# Patient Record
Sex: MALE | Race: WHITE | NOT HISPANIC OR LATINO | Employment: FULL TIME | URBAN - METROPOLITAN AREA
[De-identification: names, ages, dates, MRNs, and addresses within clinical notes are randomized per-mention and may not be internally consistent; named-entity substitution may affect disease eponyms.]

---

## 2017-02-18 DIAGNOSIS — E03.4 HYPOTHYROIDISM DUE TO ACQUIRED ATROPHY OF THYROID: ICD-10-CM

## 2017-02-20 ENCOUNTER — LAB VISIT (OUTPATIENT)
Dept: LAB | Facility: HOSPITAL | Age: 42
End: 2017-02-20
Attending: INTERNAL MEDICINE
Payer: COMMERCIAL

## 2017-02-20 DIAGNOSIS — E03.4 HYPOTHYROIDISM DUE TO ACQUIRED ATROPHY OF THYROID: ICD-10-CM

## 2017-02-20 LAB
T4 FREE SERPL-MCNC: 1.31 NG/DL
TSH SERPL DL<=0.005 MIU/L-ACNC: 0.12 UIU/ML

## 2017-02-20 PROCEDURE — 84443 ASSAY THYROID STIM HORMONE: CPT

## 2017-02-20 PROCEDURE — 84439 ASSAY OF FREE THYROXINE: CPT

## 2017-02-20 PROCEDURE — 36415 COLL VENOUS BLD VENIPUNCTURE: CPT | Mod: PO

## 2017-02-20 RX ORDER — LEVOTHYROXINE SODIUM 150 UG/1
TABLET ORAL
Qty: 30 TABLET | Refills: 1 | Status: SHIPPED | OUTPATIENT
Start: 2017-02-20 | End: 2017-06-29 | Stop reason: SDUPTHER

## 2017-06-12 ENCOUNTER — OFFICE VISIT (OUTPATIENT)
Dept: ORTHOPEDICS | Facility: CLINIC | Age: 42
End: 2017-06-12
Payer: COMMERCIAL

## 2017-06-12 ENCOUNTER — TELEPHONE (OUTPATIENT)
Dept: ORTHOPEDICS | Facility: CLINIC | Age: 42
End: 2017-06-12

## 2017-06-12 VITALS — HEIGHT: 68 IN | BODY MASS INDEX: 22.73 KG/M2 | WEIGHT: 150 LBS

## 2017-06-12 DIAGNOSIS — M77.8 TENDONITIS OF BOTH WRISTS: ICD-10-CM

## 2017-06-12 PROCEDURE — 99999 PR PBB SHADOW E&M-EST. PATIENT-LVL II: CPT | Mod: PBBFAC,,, | Performed by: ORTHOPAEDIC SURGERY

## 2017-06-12 PROCEDURE — 99203 OFFICE O/P NEW LOW 30 MIN: CPT | Mod: S$GLB,,, | Performed by: ORTHOPAEDIC SURGERY

## 2017-06-12 NOTE — TELEPHONE ENCOUNTER
----- Message from Tabatha Caldera sent at 6/12/2017  8:29 AM CDT -----  Contact: pt  X_  1st Request  _  2nd Request  _  3rd Request    ------FST Request--------    Who:NU ERNST JR. [[id number]]    Why: Patient states he is experiencing pain in both wrist patient would like to be seen today...... Please contact to further discuss and advise     What Number to Call Back: [phone]    When to Expect a call back: (Before the end of the day)   -- if call after 3:00 call back will be tomorrow.

## 2017-06-12 NOTE — PROGRESS NOTES
INITIAL VISIT HISTORY:  A 41-year-old male presents for evaluation of bilateral   hand and wrist pain of one week's duration.  Symptoms were brought on by doing a   lot of computer work recently.  Right hand is worse than left.  No numbness or   tingling is reported.  No trauma reported.  Symptoms are worse with flexion and   extension of the right wrist.    PAST MEDICAL HISTORY:  Significant for anxiety, hyperlipidemia and   hypothyroidism.    PAST SURGICAL HISTORY:  None.    FAMILY HISTORY:  Positive for hyperlipidemia.    SOCIAL HISTORY:  The patient does not smoke.  Drinks occasionally.    REVIEW OF SYSTEMS:  Negative fever, chills, rashes.    CURRENT MEDICATIONS:  Reviewed on chart.    ALLERGIES:  None.    PHYSICAL EXAMINATION:  GENERAL:  Well-developed, well-nourished male in no acute distress, alert and   oriented x3.  EXTREMITIES:  Examination of the upper extremities significant for both hands,   demonstrating tenderness over the dorsal aspect of each wrist, right worse than   left.  He has a little bit of swelling on the right wrist dorsally and is tender   over the extensor retinaculum.  He does have pain with extreme flexion and   extension of the wrist, particularly resisted wrist extension.  Tinel sign is   negative bilaterally.  Phalen test negative bilaterally.  Sensation intact in   all digits.    No x-rays.    IMPRESSION:  Bilateral wrist tendinitis, right worse than left (extensor).    PLAN:  I explained to the patient that I do not think this is carpal tunnel,   which had been one of his concerns, but I do suspect tendinitis.  I have given   him two braces for use during the day, part-time use and avoidance of heavy   lifting.  I have also started him on a Medrol Dosepak prescribed today in a   graduated fashion to see how he does.  Follow up in two weeks for recheck.  If   symptoms persist, we may need to get some x-rays.      JOEL  dd: 06/12/2017 16:02:20 (CDT)  td: 06/13/2017 11:54:42 (CDT)   Doc ID   #[id number]  Job ID #[id number]    CC:

## 2017-06-12 NOTE — TELEPHONE ENCOUNTER
Appt made for today w/Dr Mcneal at [facility]. Pt verbalized understanding to date/time/location.

## 2017-06-29 DIAGNOSIS — E03.4 HYPOTHYROIDISM DUE TO ACQUIRED ATROPHY OF THYROID: ICD-10-CM

## 2017-06-29 RX ORDER — LEVOTHYROXINE SODIUM 150 UG/1
TABLET ORAL
Qty: 30 TABLET | Refills: 0 | Status: SHIPPED | OUTPATIENT
Start: 2017-06-29 | End: 2017-07-20 | Stop reason: SDUPTHER

## 2017-07-12 ENCOUNTER — TELEPHONE (OUTPATIENT)
Dept: ORTHOPEDICS | Facility: CLINIC | Age: 42
End: 2017-07-12

## 2017-07-12 NOTE — TELEPHONE ENCOUNTER
----- Message from Domo Guzmán sent at 7/12/2017  9:07 AM CDT -----  Contact: [phone]/self  Pt needs to reschedule his 2 wk follow up//chhaya wrist pain appointment.  Please advise

## 2017-07-20 DIAGNOSIS — E78.00 HYPERCHOLESTEREMIA: ICD-10-CM

## 2017-07-20 DIAGNOSIS — E03.4 HYPOTHYROIDISM DUE TO ACQUIRED ATROPHY OF THYROID: ICD-10-CM

## 2017-07-20 RX ORDER — ATORVASTATIN CALCIUM 20 MG/1
20 TABLET, FILM COATED ORAL DAILY
Qty: 90 TABLET | Refills: 1 | Status: SHIPPED | OUTPATIENT
Start: 2017-07-20 | End: 2017-11-10 | Stop reason: SDUPTHER

## 2017-07-20 RX ORDER — LEVOTHYROXINE SODIUM 150 UG/1
150 TABLET ORAL DAILY
Qty: 30 TABLET | Refills: 5 | Status: SHIPPED | OUTPATIENT
Start: 2017-07-20 | End: 2017-11-10 | Stop reason: SDUPTHER

## 2017-07-20 NOTE — TELEPHONE ENCOUNTER
----- Message from Yessenia Beth sent at 7/19/2017  3:51 PM CDT -----  Contact: patient [phone]  Pt has changed pharmacies and will using [facility]  [phone]    Pt needs a refill of thyroid and cholesterol meds ordered. Pt doesn't know the names of his meds.

## 2017-08-14 ENCOUNTER — TELEPHONE (OUTPATIENT)
Dept: ORTHOPEDICS | Facility: CLINIC | Age: 42
End: 2017-08-14

## 2017-08-14 NOTE — TELEPHONE ENCOUNTER
I spoke with the patient and made him a follow up appointment. He was made aware of date, time and location.

## 2017-08-14 NOTE — TELEPHONE ENCOUNTER
----- Message from Josie Rios sent at 8/14/2017 10:42 AM CDT -----  Contact: Self [phone] work [phone]  Patient is calling to see if he can come in this week he is having wrist pain. Please advice

## 2017-08-17 ENCOUNTER — OFFICE VISIT (OUTPATIENT)
Dept: ORTHOPEDICS | Facility: CLINIC | Age: 42
End: 2017-08-17
Payer: COMMERCIAL

## 2017-08-17 DIAGNOSIS — M77.8 TENDONITIS OF BOTH WRISTS: Primary | ICD-10-CM

## 2017-08-17 PROCEDURE — 99213 OFFICE O/P EST LOW 20 MIN: CPT | Mod: 25,S$GLB,, | Performed by: ORTHOPAEDIC SURGERY

## 2017-08-17 PROCEDURE — 20550 NJX 1 TENDON SHEATH/LIGAMENT: CPT | Mod: 50,S$GLB,, | Performed by: ORTHOPAEDIC SURGERY

## 2017-08-17 PROCEDURE — 99999 PR PBB SHADOW E&M-EST. PATIENT-LVL I: CPT | Mod: PBBFAC,,, | Performed by: ORTHOPAEDIC SURGERY

## 2017-08-17 RX ORDER — TRIAMCINOLONE ACETONIDE 40 MG/ML
40 INJECTION, SUSPENSION INTRA-ARTICULAR; INTRAMUSCULAR
Status: COMPLETED | OUTPATIENT
Start: 2017-08-17 | End: 2017-08-17

## 2017-08-17 RX ADMIN — TRIAMCINOLONE ACETONIDE 40 MG: 40 INJECTION, SUSPENSION INTRA-ARTICULAR; INTRAMUSCULAR at 04:08

## 2017-08-17 NOTE — PROGRESS NOTES
HISTORY OF PRESENT ILLNESS:  Mr. Snyder in followup of bilateral hand and wrist   symptoms, had some improvement after the previous Medrol Dosepak, but still   having some pain in both forearms.  He does localize the area proximal to the   wrist crease near the outcropper muscles at the intersection of the second and   first dorsal compartment.    PHYSICAL EXAMINATION:  He is tender at the intersection between the second and   first dorsal compartment bilaterally.  Mild swelling is noted.  Pain with   resisted wrist extension.  Tinel sign negative bilateral.    IMPRESSION:  Extensor tendinitis, bilateral second dorsal compartment.    PLAN:  I explained the nature of the problem to the patient.  Recommended   injections into each, he was in agreement.  After pause for timeout, the patient   identified each wrist area, injected with combination of Kenalog 20 mg, 0.5 mL   Xylocaine, sterile technique, tolerated the procedure well.  Recommended ice to   both wrists, anti-inflammatory medication by mouth and follow up in one month.      JEOL  dd: 08/17/2017 16:25:25 (CDT)  td: 08/17/2017 23:25:28 (CDT)  Doc ID   #[id number]  Job ID #[id number]    CC:

## 2017-09-05 ENCOUNTER — OFFICE VISIT (OUTPATIENT)
Dept: INTERNAL MEDICINE | Facility: CLINIC | Age: 42
End: 2017-09-05
Payer: COMMERCIAL

## 2017-09-05 VITALS
DIASTOLIC BLOOD PRESSURE: 80 MMHG | HEIGHT: 68 IN | HEART RATE: 62 BPM | TEMPERATURE: 99 F | BODY MASS INDEX: 22.69 KG/M2 | RESPIRATION RATE: 16 BRPM | SYSTOLIC BLOOD PRESSURE: 121 MMHG | WEIGHT: 149.69 LBS

## 2017-09-05 DIAGNOSIS — M94.0 COSTOCHONDRITIS: ICD-10-CM

## 2017-09-05 LAB
BILIRUB SERPL-MCNC: NORMAL MG/DL
BLOOD URINE, POC: NORMAL
COLOR, POC UA: YELLOW
GLUCOSE UR QL STRIP: NORMAL
KETONES UR QL STRIP: NORMAL
LEUKOCYTE ESTERASE URINE, POC: NORMAL
NITRITE, POC UA: NORMAL
PH, POC UA: 6
PROTEIN, POC: NORMAL
SPECIFIC GRAVITY, POC UA: 1
UROBILINOGEN, POC UA: NORMAL

## 2017-09-05 PROCEDURE — 99214 OFFICE O/P EST MOD 30 MIN: CPT | Mod: 25,S$GLB,, | Performed by: INTERNAL MEDICINE

## 2017-09-05 PROCEDURE — 81001 URINALYSIS AUTO W/SCOPE: CPT | Mod: S$GLB,,, | Performed by: INTERNAL MEDICINE

## 2017-09-05 PROCEDURE — 99999 PR PBB SHADOW E&M-EST. PATIENT-LVL III: CPT | Mod: PBBFAC,,, | Performed by: INTERNAL MEDICINE

## 2017-09-05 PROCEDURE — 3008F BODY MASS INDEX DOCD: CPT | Mod: S$GLB,,, | Performed by: INTERNAL MEDICINE

## 2017-09-05 NOTE — PROGRESS NOTES
Subjective:       Patient ID: Srinivas Snyder Jr. is a 41 y.o. male.    Chief Complaint: Abdominal Pain  HISTORY OF PRESENT ILLNESS:  A 41-year-old white male patient of Dr. Reeder, who comes in with pain and what he calls his left upper quadrant on   and off.  The pain is increased by local pressure lying on his left side and   with certain movements.  He works at [employer] so he does a fair amount   of lifting.  He has been treated for thyroid and cholesterol problems.  He does   not do any other exercise.  He has had no problems with bowel, bladder or   breathing.    PHYSICAL EXAMINATION:  VITAL SIGNS:  Normal.  SKIN:  Fair and healthy.  There is no rash.  CHEST:  Clear.  ABDOMEN:  It is tender on the right, 9th, 10th posterior lateral rib and in the   costochondral margins.      I did a urinalysis that was negative.    IMPRESSION:  Costochondritis.  He was told to use an anti-inflammatory, told the   nature of the illness.      JDC/IN  dd: 09/06/2017 17:09:00 (CDT)  td: 09/07/2017 04:31:57 (CDT)  Doc ID   #[id number]  Job ID #[id number]    CC:     Dict [id number]  hospitals  Review of Systems   Constitutional: Negative for activity change, appetite change, fatigue and unexpected weight change.   HENT: Negative for dental problem, hearing loss, mouth sores, postnasal drip and sinus pressure.    Eyes: Negative for discharge and visual disturbance.   Respiratory: Negative for cough and shortness of breath.    Cardiovascular: Positive for chest pain. Negative for palpitations.   Gastrointestinal: Negative for abdominal pain, blood in stool, constipation, diarrhea and nausea.   Genitourinary: Negative for dysuria, hematuria and testicular pain.   Musculoskeletal: Positive for back pain. Negative for arthralgias, joint swelling and neck pain.   Skin: Negative for rash.   Neurological: Negative for weakness and headaches.   Psychiatric/Behavioral: Negative for agitation and sleep disturbance.       Objective:       Physical Exam   Constitutional: He is oriented to person, place, and time. He appears well-developed and well-nourished.   HENT:   Head: Normocephalic.   Right Ear: External ear normal.   Left Ear: External ear normal.   Mouth/Throat: Oropharynx is clear and moist.   Eyes: EOM are normal. Pupils are equal, round, and reactive to light. Right eye exhibits no discharge.   Neck: Normal range of motion. No JVD present. No tracheal deviation present. No thyromegaly present.   Cardiovascular: Normal rate, regular rhythm, normal heart sounds and intact distal pulses.  Exam reveals no gallop.    No murmur heard.  Pulmonary/Chest: Effort normal and breath sounds normal. He has no wheezes. He has no rales. He exhibits tenderness.   Abdominal: Soft. Bowel sounds are normal. He exhibits no mass. There is no tenderness.   Genitourinary: Prostate normal and penis normal. Rectal exam shows guaiac negative stool.   Musculoskeletal: Normal range of motion. He exhibits tenderness. He exhibits no edema.   Lymphadenopathy:     He has no cervical adenopathy.   Neurological: He is oriented to person, place, and time. He displays normal reflexes. No cranial nerve deficit. Coordination normal.   Skin: Skin is warm. No rash noted. No pallor.   Psychiatric: He has a normal mood and affect.       Assessment:       1. Costochondritis        Plan:

## 2017-11-10 ENCOUNTER — TELEPHONE (OUTPATIENT)
Dept: INTERNAL MEDICINE | Facility: CLINIC | Age: 42
End: 2017-11-10

## 2017-11-10 DIAGNOSIS — E78.00 HYPERCHOLESTEREMIA: ICD-10-CM

## 2017-11-10 DIAGNOSIS — E03.4 HYPOTHYROIDISM DUE TO ACQUIRED ATROPHY OF THYROID: ICD-10-CM

## 2017-11-10 RX ORDER — LEVOTHYROXINE SODIUM 150 UG/1
150 TABLET ORAL DAILY
Qty: 90 TABLET | Refills: 3 | Status: SHIPPED | OUTPATIENT
Start: 2017-11-10 | End: 2017-12-04

## 2017-11-10 RX ORDER — ATORVASTATIN CALCIUM 20 MG/1
20 TABLET, FILM COATED ORAL DAILY
Qty: 90 TABLET | Refills: 3 | Status: SHIPPED | OUTPATIENT
Start: 2017-11-10 | End: 2018-11-30 | Stop reason: SDUPTHER

## 2017-11-10 NOTE — TELEPHONE ENCOUNTER
----- Message from Melani Orr sent at 11/10/2017 11:38 AM CST -----  Contact: Self [phone]  RX request - refill or new RX.  Is this a refill or new RX:  refill  RX name and strength: levothyroxine (SYNTHROID) 150 MCG tablet  Directions:   Is this a 30 day or 90 day RX:  30  Pharmacy name and phone # : [facility]  [phone] ([phone] (Fax)  Comments:        RX request - refill or new RX.  Is this a refill or new RX:  refill  RX name and strength: atorvastatin (LIPITOR) 20 MG tablet ()  Directions:   Is this a 30 day or 90 day RX:  30  Pharmacy name and phone #[facility]  [phone] ([phone] (Fax)   Comments:

## 2017-11-28 ENCOUNTER — TELEPHONE (OUTPATIENT)
Dept: INTERNAL MEDICINE | Facility: CLINIC | Age: 42
End: 2017-11-28

## 2017-11-28 NOTE — TELEPHONE ENCOUNTER
----- Message from Shira Hill sent at 11/28/2017  9:01 AM CST -----  Contact: Self/[phone]  Pt is having difficulty taking off of work tomorrow for appointment at 4:20. Pt would like to know if at all possible can he come in early for his appointment. Please call and advise.

## 2017-11-29 ENCOUNTER — OFFICE VISIT (OUTPATIENT)
Dept: INTERNAL MEDICINE | Facility: CLINIC | Age: 42
End: 2017-11-29
Payer: COMMERCIAL

## 2017-11-29 VITALS
HEART RATE: 66 BPM | DIASTOLIC BLOOD PRESSURE: 78 MMHG | SYSTOLIC BLOOD PRESSURE: 138 MMHG | WEIGHT: 154.13 LBS | BODY MASS INDEX: 23.36 KG/M2 | TEMPERATURE: 98 F | HEIGHT: 68 IN | RESPIRATION RATE: 18 BRPM

## 2017-11-29 DIAGNOSIS — E78.00 HYPERCHOLESTEREMIA: ICD-10-CM

## 2017-11-29 DIAGNOSIS — Z00.00 ANNUAL PHYSICAL EXAM: Primary | ICD-10-CM

## 2017-11-29 DIAGNOSIS — E03.4 HYPOTHYROIDISM DUE TO ACQUIRED ATROPHY OF THYROID: ICD-10-CM

## 2017-11-29 PROCEDURE — 99999 PR PBB SHADOW E&M-EST. PATIENT-LVL III: CPT | Mod: PBBFAC,,, | Performed by: INTERNAL MEDICINE

## 2017-11-29 PROCEDURE — 99396 PREV VISIT EST AGE 40-64: CPT | Mod: S$GLB,,, | Performed by: INTERNAL MEDICINE

## 2017-11-29 NOTE — PROGRESS NOTES
Subjective:       Patient ID: Srinivas Snyder Jr. is a 42 y.o. male.    Chief Complaint: Annual Exam    HPI   42 y.o. Male here for annual exam.      Cholesterol: (needs)  Vaccines: Influenza (2017); Tetanus (2015)  Eye exam: declined     Exercise: no  Diet: regular     Past Medical History:    Anxiety                                                       Hyperlipidemia                                                Hypothyroidism                                              No past surgical history on file.  Social History    Marital status:              Spouse name:                       Years of education:                 Number of children: 1              Occupational History  Occupation          Employer            Comment               IT support                                   Social History Main Topics    Smoking status: Never Smoker                                                                 Smokeless status: Never Used                        Alcohol use: Yes           0.0 oz/week       0 Standard drinks or equivalent per week       Comment: Social     Drug use: No              Sexual activity: Yes               Partners with: Female     No Known Allergies  Mr. Snyder does not currently have medications on file.     Review of Systems   Constitutional: Negative for activity change, appetite change, chills, diaphoresis, fatigue, fever and unexpected weight change.   HENT: Negative for congestion, mouth sores, postnasal drip, rhinorrhea, sinus pressure, sneezing, sore throat, trouble swallowing and voice change.    Eyes: Negative for discharge, itching and visual disturbance.   Respiratory: Negative for cough, chest tightness, shortness of breath and wheezing.    Cardiovascular: Negative for chest pain, palpitations and leg swelling.   Gastrointestinal: Negative for abdominal pain, blood in stool, constipation, diarrhea, nausea and vomiting.   Endocrine: Negative for cold intolerance and heat  intolerance.   Genitourinary: Negative for difficulty urinating, dysuria, flank pain, hematuria and urgency.   Musculoskeletal: Negative for arthralgias, back pain, myalgias and neck pain.   Skin: Negative for rash and wound.   Allergic/Immunologic: Negative for environmental allergies and food allergies.   Neurological: Negative for dizziness, tremors, seizures, syncope, weakness and headaches.   Hematological: Negative for adenopathy. Does not bruise/bleed easily.   Psychiatric/Behavioral: Negative for confusion, sleep disturbance and suicidal ideas. The patient is not nervous/anxious.        Objective:      Physical Exam   Constitutional: He is oriented to person, place, and time. He appears well-developed and well-nourished. No distress.   HENT:   Head: Normocephalic and atraumatic.   Right Ear: External ear normal.   Left Ear: External ear normal.   Nose: Nose normal.   Mouth/Throat: Oropharynx is clear and moist. No oropharyngeal exudate.   Eyes: Conjunctivae and EOM are normal. Pupils are equal, round, and reactive to light. Right eye exhibits no discharge. Left eye exhibits no discharge. No scleral icterus.   Neck: Normal range of motion. Neck supple. No JVD present. No thyromegaly present.   Cardiovascular: Normal rate, regular rhythm, normal heart sounds and intact distal pulses.    No murmur heard.  Pulmonary/Chest: Effort normal and breath sounds normal. No respiratory distress. He has no wheezes. He has no rales.   Abdominal: Soft. Bowel sounds are normal. He exhibits no distension. There is no tenderness. There is no guarding.   Musculoskeletal: He exhibits no edema.   Lymphadenopathy:     He has no cervical adenopathy.   Neurological: He is alert and oriented to person, place, and time. No cranial nerve deficit. Coordination normal.   Skin: Skin is warm and dry. No rash noted. He is not diaphoretic. No pallor.   Psychiatric: He has a normal mood and affect. Judgment normal.       Assessment:       1.  Annual physical exam    2. Hypothyroidism due to acquired atrophy of thyroid    3. Hypercholesteremia        Plan:    Complete blood work ordered    Vaccines: Influenza (2017); Tetanus (2015)   Eye exam: declined      1. Hypothyroidism- continue Synthroid 150 mcg daily   2. HLD- continue Lipitor 20 mg daily   3. F/u in 1 yr or PRN

## 2017-12-01 ENCOUNTER — LAB VISIT (OUTPATIENT)
Dept: LAB | Facility: HOSPITAL | Age: 42
End: 2017-12-01
Attending: INTERNAL MEDICINE
Payer: COMMERCIAL

## 2017-12-01 DIAGNOSIS — Z00.00 ANNUAL PHYSICAL EXAM: ICD-10-CM

## 2017-12-01 DIAGNOSIS — E78.00 HYPERCHOLESTEREMIA: ICD-10-CM

## 2017-12-01 DIAGNOSIS — E03.4 HYPOTHYROIDISM DUE TO ACQUIRED ATROPHY OF THYROID: ICD-10-CM

## 2017-12-01 LAB
ALBUMIN SERPL BCP-MCNC: 3.6 G/DL
ALBUMIN SERPL BCP-MCNC: 3.6 G/DL
ALP SERPL-CCNC: 90 U/L
ALP SERPL-CCNC: 90 U/L
ALT SERPL W/O P-5'-P-CCNC: 21 U/L
ALT SERPL W/O P-5'-P-CCNC: 21 U/L
ANION GAP SERPL CALC-SCNC: 7 MMOL/L
ANION GAP SERPL CALC-SCNC: 7 MMOL/L
AST SERPL-CCNC: 23 U/L
AST SERPL-CCNC: 23 U/L
BASOPHILS # BLD AUTO: 0.04 K/UL
BASOPHILS NFR BLD: 0.6 %
BILIRUB SERPL-MCNC: 0.4 MG/DL
BILIRUB SERPL-MCNC: 0.4 MG/DL
BUN SERPL-MCNC: 9 MG/DL
BUN SERPL-MCNC: 9 MG/DL
CALCIUM SERPL-MCNC: 8.7 MG/DL
CALCIUM SERPL-MCNC: 8.7 MG/DL
CHLORIDE SERPL-SCNC: 106 MMOL/L
CHLORIDE SERPL-SCNC: 106 MMOL/L
CHOLEST SERPL-MCNC: 130 MG/DL
CHOLEST/HDLC SERPL: 4.1 {RATIO}
CO2 SERPL-SCNC: 28 MMOL/L
CO2 SERPL-SCNC: 28 MMOL/L
CREAT SERPL-MCNC: 1.1 MG/DL
CREAT SERPL-MCNC: 1.1 MG/DL
DIFFERENTIAL METHOD: ABNORMAL
EOSINOPHIL # BLD AUTO: 0.4 K/UL
EOSINOPHIL NFR BLD: 6.6 %
ERYTHROCYTE [DISTWIDTH] IN BLOOD BY AUTOMATED COUNT: 12.1 %
EST. GFR  (AFRICAN AMERICAN): >60 ML/MIN/1.73 M^2
EST. GFR  (AFRICAN AMERICAN): >60 ML/MIN/1.73 M^2
EST. GFR  (NON AFRICAN AMERICAN): >60 ML/MIN/1.73 M^2
EST. GFR  (NON AFRICAN AMERICAN): >60 ML/MIN/1.73 M^2
GLUCOSE SERPL-MCNC: 94 MG/DL
GLUCOSE SERPL-MCNC: 94 MG/DL
HCT VFR BLD AUTO: 42.3 %
HDLC SERPL-MCNC: 32 MG/DL
HDLC SERPL: 24.6 %
HGB BLD-MCNC: 13.9 G/DL
IMM GRANULOCYTES # BLD AUTO: 0.03 K/UL
IMM GRANULOCYTES NFR BLD AUTO: 0.5 %
LDLC SERPL CALC-MCNC: 79.6 MG/DL
LYMPHOCYTES # BLD AUTO: 1.6 K/UL
LYMPHOCYTES NFR BLD: 24.9 %
MCH RBC QN AUTO: 30.5 PG
MCHC RBC AUTO-ENTMCNC: 32.9 G/DL
MCV RBC AUTO: 93 FL
MONOCYTES # BLD AUTO: 0.7 K/UL
MONOCYTES NFR BLD: 10.4 %
NEUTROPHILS # BLD AUTO: 3.7 K/UL
NEUTROPHILS NFR BLD: 57 %
NONHDLC SERPL-MCNC: 98 MG/DL
NRBC BLD-RTO: 0 /100 WBC
PLATELET # BLD AUTO: 179 K/UL
PMV BLD AUTO: 12 FL
POTASSIUM SERPL-SCNC: 4.1 MMOL/L
POTASSIUM SERPL-SCNC: 4.1 MMOL/L
PROT SERPL-MCNC: 6.8 G/DL
PROT SERPL-MCNC: 6.8 G/DL
RBC # BLD AUTO: 4.55 M/UL
SODIUM SERPL-SCNC: 141 MMOL/L
SODIUM SERPL-SCNC: 141 MMOL/L
T4 FREE SERPL-MCNC: 1.7 NG/DL
TRIGL SERPL-MCNC: 92 MG/DL
TSH SERPL DL<=0.005 MIU/L-ACNC: 0.03 UIU/ML
WBC # BLD AUTO: 6.55 K/UL

## 2017-12-01 PROCEDURE — 84443 ASSAY THYROID STIM HORMONE: CPT

## 2017-12-01 PROCEDURE — 85025 COMPLETE CBC W/AUTO DIFF WBC: CPT

## 2017-12-01 PROCEDURE — 84439 ASSAY OF FREE THYROXINE: CPT

## 2017-12-01 PROCEDURE — 80061 LIPID PANEL: CPT

## 2017-12-01 PROCEDURE — 36415 COLL VENOUS BLD VENIPUNCTURE: CPT | Mod: PO

## 2017-12-01 PROCEDURE — 80053 COMPREHEN METABOLIC PANEL: CPT

## 2017-12-04 ENCOUNTER — TELEPHONE (OUTPATIENT)
Dept: INTERNAL MEDICINE | Facility: CLINIC | Age: 42
End: 2017-12-04

## 2017-12-04 DIAGNOSIS — E03.9 HYPOTHYROIDISM, UNSPECIFIED TYPE: Primary | ICD-10-CM

## 2017-12-04 RX ORDER — LEVOTHYROXINE SODIUM 112 UG/1
112 TABLET ORAL DAILY
Qty: 30 TABLET | Refills: 11 | Status: SHIPPED | OUTPATIENT
Start: 2017-12-04 | End: 2018-11-30 | Stop reason: SDUPTHER

## 2017-12-04 NOTE — TELEPHONE ENCOUNTER
----- Message from Amor Reeder DO sent at 12/4/2017  2:46 PM CST -----  Thyroid function is over controlled, will decrease dose of synthroid and repeat the test in 1 month   All other labs stable

## 2018-01-29 ENCOUNTER — TELEPHONE (OUTPATIENT)
Dept: ORTHOPEDICS | Facility: CLINIC | Age: 43
End: 2018-01-29

## 2018-01-29 NOTE — TELEPHONE ENCOUNTER
----- Message from Salvatore Douglas sent at 1/29/2018  1:44 PM CST -----  Contact: self/[phone]  He thinks he may have torn a muscle in his shoulder and he needs an appt  asap.

## 2018-02-01 ENCOUNTER — TELEPHONE (OUTPATIENT)
Dept: ORTHOPEDICS | Facility: CLINIC | Age: 43
End: 2018-02-01

## 2018-02-01 NOTE — TELEPHONE ENCOUNTER
----- Message from Josie Rios sent at 2/1/2018 11:26 AM CST -----  Contact: Self [phone]  Patient Returning Your Phone Call

## 2018-02-01 NOTE — TELEPHONE ENCOUNTER
I spoke with the patient and made him a new problem appointment. He was made aware of date, time and location.

## 2018-02-05 DIAGNOSIS — M25.512 ACUTE PAIN OF LEFT SHOULDER: Primary | ICD-10-CM

## 2018-02-06 ENCOUNTER — HOSPITAL ENCOUNTER (OUTPATIENT)
Dept: RADIOLOGY | Facility: HOSPITAL | Age: 43
Discharge: HOME | End: 2018-02-06
Attending: ORTHOPAEDIC SURGERY
Payer: COMMERCIAL

## 2018-02-06 ENCOUNTER — TELEPHONE (OUTPATIENT)
Dept: ORTHOPEDICS | Facility: CLINIC | Age: 43
End: 2018-02-06

## 2018-02-06 ENCOUNTER — OFFICE VISIT (OUTPATIENT)
Dept: ORTHOPEDICS | Facility: CLINIC | Age: 43
End: 2018-02-06
Payer: COMMERCIAL

## 2018-02-06 DIAGNOSIS — M79.602 LEFT ARM PAIN: ICD-10-CM

## 2018-02-06 DIAGNOSIS — M79.601 RIGHT ARM PAIN: Primary | ICD-10-CM

## 2018-02-06 DIAGNOSIS — M79.601 RIGHT ARM PAIN: ICD-10-CM

## 2018-02-06 DIAGNOSIS — M77.12 LATERAL EPICONDYLITIS OF LEFT ELBOW: ICD-10-CM

## 2018-02-06 PROCEDURE — 73080 X-RAY EXAM OF ELBOW: CPT | Mod: TC,FY,LT

## 2018-02-06 PROCEDURE — 73080 X-RAY EXAM OF ELBOW: CPT | Mod: 26,LT,, | Performed by: RADIOLOGY

## 2018-02-06 PROCEDURE — 99999 PR PBB SHADOW E&M-EST. PATIENT-LVL II: CPT | Mod: PBBFAC,,, | Performed by: ORTHOPAEDIC SURGERY

## 2018-02-06 PROCEDURE — 73030 X-RAY EXAM OF SHOULDER: CPT | Mod: 26,LT,, | Performed by: RADIOLOGY

## 2018-02-06 PROCEDURE — 73030 X-RAY EXAM OF SHOULDER: CPT | Mod: TC,FY,LT

## 2018-02-06 PROCEDURE — 99213 OFFICE O/P EST LOW 20 MIN: CPT | Mod: 25,S$GLB,, | Performed by: ORTHOPAEDIC SURGERY

## 2018-02-06 PROCEDURE — 3008F BODY MASS INDEX DOCD: CPT | Mod: S$GLB,,, | Performed by: ORTHOPAEDIC SURGERY

## 2018-02-06 PROCEDURE — 20551 NJX 1 TENDON ORIGIN/INSJ: CPT | Mod: LT,S$GLB,, | Performed by: ORTHOPAEDIC SURGERY

## 2018-02-06 RX ORDER — TRIAMCINOLONE ACETONIDE 40 MG/ML
40 INJECTION, SUSPENSION INTRA-ARTICULAR; INTRAMUSCULAR
Status: COMPLETED | OUTPATIENT
Start: 2018-02-06 | End: 2018-02-06

## 2018-02-06 RX ADMIN — TRIAMCINOLONE ACETONIDE 40 MG: 40 INJECTION, SUSPENSION INTRA-ARTICULAR; INTRAMUSCULAR at 11:02

## 2018-02-06 NOTE — TELEPHONE ENCOUNTER
----- Message from Josie Rios sent at 2/6/2018  3:12 PM CST -----  Contact: Self [phone]  Patient would like his medication sent to [facility] [phone]. Please advice

## 2018-02-06 NOTE — PROGRESS NOTES
HISTORY OF PRESENT ILLNESS:  Mr. Snyder seen previously for forearm tendinitis   returns today with new complaints of left elbow pain of several months'   duration.  He also has some shoulder problems that he thinks may be related to   an old injury when he was in the [employer] some years ago but the main complaint is   pain in the left elbow.    PHYSICAL EXAMINATION:  He has full range of motion of left shoulder.  Negative   impingement sign.  The elbow is tender laterally over the lateral epicondylar   area.  Good biceps and triceps function, no instability, but he has some pain on   terminal extension.  Sensation intact.    X-RAYS:  AP and lateral, left shoulder, demonstrate slight irregularity at the   greater tuberosity, which could represent an old injury.  X-rays of the left   elbow showed no abnormalities.    IMPRESSION:  1.  Left elbow lateral epicondylitis.  2.  Possible old injury, left shoulder unrelated.    PLAN:  I explained the nature of the problem to the patient.  Recommended we try   an injection for the left elbow.  After pause for timeout, he identified the   left elbow injected laterally with combination of Kenalog 20 mg, 0.5 mL   Xylocaine, sterile technique, which he tolerated well with no complications.    Recommended ice to the area, anti-inflammatory medication by mouth and   stretching and strengthening left forearm.  Follow up in 3-4 weeks.      JOEL  dd: 02/06/2018 11:55:49 (CST)  td: 02/07/2018 10:04:37 (CST)  Doc ID   #[id number]  Job ID #[id number]    CC:

## 2018-03-09 ENCOUNTER — TELEPHONE (OUTPATIENT)
Dept: ORTHOPEDICS | Facility: CLINIC | Age: 43
End: 2018-03-09

## 2018-03-09 NOTE — TELEPHONE ENCOUNTER
----- Message from Mraialuisa Zeng sent at 3/9/2018  3:08 PM CST -----  Contact: Self [phone]  Pt is requesting a call back to see if he is able to be worked in sooner than the first available which was 3.16.18.  Pt rolled his ankle falling backward from the attic and now he is having quite a bit of pain in the right calf.      Pt may be reached at [phone].    Thank you.  LC

## 2018-03-12 ENCOUNTER — OFFICE VISIT (OUTPATIENT)
Dept: ORTHOPEDICS | Facility: CLINIC | Age: 43
End: 2018-03-12
Payer: COMMERCIAL

## 2018-03-12 ENCOUNTER — HOSPITAL ENCOUNTER (OUTPATIENT)
Dept: RADIOLOGY | Facility: HOSPITAL | Age: 43
Discharge: HOME | End: 2018-03-12
Attending: PHYSICIAN ASSISTANT
Payer: COMMERCIAL

## 2018-03-12 ENCOUNTER — TELEPHONE (OUTPATIENT)
Dept: ORTHOPEDICS | Facility: CLINIC | Age: 43
End: 2018-03-12

## 2018-03-12 VITALS — HEIGHT: 68 IN | WEIGHT: 155.13 LBS | BODY MASS INDEX: 23.51 KG/M2

## 2018-03-12 DIAGNOSIS — S82.831A CLOSED FRACTURE OF PROXIMAL END OF RIGHT FIBULA, UNSPECIFIED FRACTURE MORPHOLOGY, INITIAL ENCOUNTER: Primary | ICD-10-CM

## 2018-03-12 DIAGNOSIS — M79.604 RIGHT LEG PAIN: ICD-10-CM

## 2018-03-12 PROCEDURE — 99999 PR PBB SHADOW E&M-EST. PATIENT-LVL III: CPT | Mod: PBBFAC,,, | Performed by: PHYSICIAN ASSISTANT

## 2018-03-12 PROCEDURE — 73590 X-RAY EXAM OF LOWER LEG: CPT | Mod: TC,RT

## 2018-03-12 PROCEDURE — 73590 X-RAY EXAM OF LOWER LEG: CPT | Mod: 26,RT,, | Performed by: RADIOLOGY

## 2018-03-12 PROCEDURE — 99203 OFFICE O/P NEW LOW 30 MIN: CPT | Mod: S$GLB,,, | Performed by: PHYSICIAN ASSISTANT

## 2018-03-12 RX ORDER — TRAMADOL HYDROCHLORIDE 50 MG/1
50 TABLET ORAL
Qty: 60 TABLET | Refills: 0 | Status: SHIPPED | OUTPATIENT
Start: 2018-03-12 | End: 2018-03-22

## 2018-03-12 NOTE — TELEPHONE ENCOUNTER
----- Message from Carol Cobb sent at 3/12/2018  8:19 AM CDT -----  Contact: self@home  Patient returning phone call from Friday to get an appointment time for today.

## 2018-03-12 NOTE — PROGRESS NOTES
Subjective:      Patient ID: Srinivas Snyder Jr. is a 42 y.o. male.    Chief Complaint: No chief complaint on file.    HPI  42 year old male presents with chief complaint of right calf pain x 2 weeks. He was on a ladder and when he was falling back, he put his right foot down and the ankle rolled and he fell. He had pain with WB. He was limping for 1 week. Pain is at the lateral calf. It occurs if he stands for a while. He has mild pain with walking. He hasn't taken any medicine for pain. He denies swelling and bruising. He denies ankle pain. He says his leg has gotten better. He does not use assistive devices.   Review of Systems   Constitution: Negative for chills, fever and night sweats.   Cardiovascular: Negative for chest pain.   Respiratory: Negative for cough and shortness of breath.    Hematologic/Lymphatic: Does not bruise/bleed easily.   Skin: Negative for color change.   Gastrointestinal: Negative for heartburn.   Genitourinary: Negative for dysuria.   Neurological: Negative for numbness and paresthesias.   Psychiatric/Behavioral: Negative for altered mental status.   Allergic/Immunologic: Negative for persistent infections.         Objective:            General    Vitals reviewed.  Constitutional: He is oriented to person, place, and time. He appears well-developed and well-nourished.   Cardiovascular: Normal rate.    Neurological: He is alert and oriented to person, place, and time.             Right LE Exam:  Normal ROM at the knee and ankle without pain  No LE swelling  No bruising  TTP proximal fibula and lateral calf muscle  No TTP at the ankle      X-ray: ordered and reviewed by myself. Fracture present at the fibula head.       Assessment:       Encounter Diagnosis   Name Primary?    Closed fracture of proximal end of right fibula, unspecified fracture morphology, initial encounter Yes          Plan:       Patient will rest, elevate, and may alternate ice/heat. He is WBAT. Prescription for  tramadol given. RTC in 4 weeks for repeat x-rays.

## 2018-04-03 ENCOUNTER — TELEPHONE (OUTPATIENT)
Dept: INTERNAL MEDICINE | Facility: CLINIC | Age: 43
End: 2018-04-03

## 2018-04-03 NOTE — TELEPHONE ENCOUNTER
----- Message from Estelita Jennings sent at 4/2/2018  3:48 PM CDT -----  Contact: Patient [phone]  Stated that his Rx levothyroxine (SYNTHROID) was reduced from 150mg to 112mg and he wants it put back.    Please call and advise.    Thank You

## 2018-07-03 ENCOUNTER — OFFICE VISIT (OUTPATIENT)
Dept: URGENT CARE | Facility: CLINIC | Age: 43
End: 2018-07-03
Payer: COMMERCIAL

## 2018-07-03 VITALS
DIASTOLIC BLOOD PRESSURE: 92 MMHG | BODY MASS INDEX: 23.49 KG/M2 | WEIGHT: 155 LBS | OXYGEN SATURATION: 100 % | HEIGHT: 68 IN | HEART RATE: 76 BPM | SYSTOLIC BLOOD PRESSURE: 150 MMHG | RESPIRATION RATE: 18 BRPM | TEMPERATURE: 98 F

## 2018-07-03 DIAGNOSIS — R42 VERTIGO: ICD-10-CM

## 2018-07-03 DIAGNOSIS — K21.9 GASTROESOPHAGEAL REFLUX DISEASE, ESOPHAGITIS PRESENCE NOT SPECIFIED: ICD-10-CM

## 2018-07-03 DIAGNOSIS — T67.5XXA HEAT EXHAUSTION, INITIAL ENCOUNTER: Primary | ICD-10-CM

## 2018-07-03 DIAGNOSIS — R11.0 NAUSEA: ICD-10-CM

## 2018-07-03 LAB
GLUCOSE SERPL-MCNC: ABNORMAL MG/DL (ref 70–110)
POC ANION GAP: 15 MMOL/L (ref 10–20)
POC BUN: 16 MMOL/L (ref 8–26)
POC CHLORIDE: 107 MMOL/L (ref 98–109)
POC CREATININE: 1.2 MG/DL (ref 0.6–1.3)
POC HEMATOCRIT: 43 %PCV (ref 42–52)
POC HEMOGLOBIN: 14.6 G/DL (ref 13.5–18)
POC ICA: 1.07 MMOL/L (ref 1.12–1.32)
POC POTASSIUM: 4.4 MMOL/L (ref 3.5–4.9)
POC SODIUM: 140 MMOL/L (ref 138–146)
POC TCO2: 23 MMOL/L (ref 24–29)

## 2018-07-03 PROCEDURE — 3008F BODY MASS INDEX DOCD: CPT | Mod: CPTII,S$GLB,, | Performed by: FAMILY MEDICINE

## 2018-07-03 PROCEDURE — 99214 OFFICE O/P EST MOD 30 MIN: CPT | Mod: S$GLB,,, | Performed by: FAMILY MEDICINE

## 2018-07-03 PROCEDURE — 80047 BASIC METABLC PNL IONIZED CA: CPT | Mod: QW,S$GLB,, | Performed by: FAMILY MEDICINE

## 2018-07-03 RX ORDER — SODIUM CHLORIDE 9 MG/ML
INJECTION, SOLUTION INTRAVENOUS
Status: COMPLETED | OUTPATIENT
Start: 2018-07-03 | End: 2018-07-03

## 2018-07-03 RX ORDER — PANTOPRAZOLE SODIUM 40 MG/1
TABLET, DELAYED RELEASE ORAL
Qty: 90 TABLET | Refills: 0 | Status: SHIPPED | OUTPATIENT
Start: 2018-07-03 | End: 2018-11-19

## 2018-07-03 RX ORDER — MECLIZINE HYDROCHLORIDE 25 MG/1
25 TABLET ORAL 3 TIMES DAILY PRN
Qty: 30 TABLET | Refills: 0 | Status: SHIPPED | OUTPATIENT
Start: 2018-07-03 | End: 2018-07-13 | Stop reason: SDUPTHER

## 2018-07-03 RX ORDER — PANTOPRAZOLE SODIUM 40 MG/1
40 TABLET, DELAYED RELEASE ORAL DAILY
Qty: 30 TABLET | Refills: 0 | Status: SHIPPED | OUTPATIENT
Start: 2018-07-03 | End: 2018-07-03 | Stop reason: SDUPTHER

## 2018-07-03 RX ORDER — ONDANSETRON 4 MG/1
4 TABLET, ORALLY DISINTEGRATING ORAL EVERY 8 HOURS PRN
Qty: 20 TABLET | Refills: 0 | Status: SHIPPED | OUTPATIENT
Start: 2018-07-03 | End: 2018-07-10

## 2018-07-03 RX ADMIN — SODIUM CHLORIDE: 9 INJECTION, SOLUTION INTRAVENOUS at 12:07

## 2018-07-03 NOTE — PROGRESS NOTES
"Subjective:       Patient ID: Srinivas Snyder Jr. is a 42 y.o. male.    Vitals:  height is 5' 8" (1.727 m) and weight is 70.3 kg (155 lb). His oral temperature is 97.6 °F (36.4 °C). His blood pressure is 150/92 (abnormal) and his pulse is 76. His respiration is 18 and oxygen saturation is 100%.     Chief Complaint: Dizziness    Dizziness:   Chronicity:  New  Onset:  In the past 7 days  Progression since onset:  Unchanged  Frequency:  Constantly  Pain Scale:  4/10  Severity:  Moderate  Duration:  Very brief  Dizziness characteristics:  Motion-sickness   Associated symptoms: nausea and weakness.no fever, no headaches, no vomiting and no chest pain.  Aggravated by:  Bending, lying down, position changes and getting up  Treatments tried:  Nothing  Improvements on treatment:  No relief    Review of Systems   Constitution: Positive for weakness. Negative for chills and fever.   HENT: Negative for sore throat.    Eyes: Negative for blurred vision.   Cardiovascular: Negative for chest pain.   Respiratory: Negative for shortness of breath.    Skin: Negative for rash.   Musculoskeletal: Positive for neck pain. Negative for back pain and joint pain.   Gastrointestinal: Positive for nausea. Negative for abdominal pain, diarrhea and vomiting.   Neurological: Positive for dizziness. Negative for headaches.   Psychiatric/Behavioral: The patient is not nervous/anxious.        Objective:      Physical Exam   Constitutional: He is oriented to person, place, and time. He appears well-developed and well-nourished.   HENT:   Head: Normocephalic and atraumatic.   Right Ear: External ear normal.   Left Ear: External ear normal.   Dry lips, oral mucosa moist.   Eyes: EOM are normal. Pupils are equal, round, and reactive to light.   Neck: Normal range of motion. Neck supple. No JVD present. No tracheal deviation present. No thyromegaly present.   Cardiovascular: Normal rate, regular rhythm and normal heart sounds.  Exam reveals no " gallop and no friction rub.    No murmur heard.  Pulmonary/Chest: Breath sounds normal. No respiratory distress. He has no wheezes. He has no rales. He exhibits no tenderness.   Abdominal: Soft. Bowel sounds are normal. He exhibits no distension and no mass. There is no rebound and no guarding. No hernia.   Mild epigastric pain with palpation.   Musculoskeletal: Normal range of motion. He exhibits no edema, tenderness or deformity.   Lymphadenopathy:     He has no cervical adenopathy.   Neurological: He is alert and oriented to person, place, and time. He displays normal reflexes. No cranial nerve deficit. He exhibits normal muscle tone. Coordination normal.   Skin: Skin is warm. Capillary refill takes less than 2 seconds. No rash noted. No erythema. No pallor.   Psychiatric: He has a normal mood and affect. His behavior is normal. Judgment and thought content normal.   Vitals reviewed.      Assessment:       1. Heat exhaustion, initial encounter    2. Vertigo    3. Nausea    4. Gastroesophageal reflux disease, esophagitis presence not specified        Plan:         Heat exhaustion, initial encounter  -     0.9%  NaCl infusion; Inject into the vein one time.  -     POCT Chemistry Panel  -     POCT Urinalysis, Dipstick, Automated, W/O Scope    Vertigo  -     meclizine (ANTIVERT) 25 mg tablet; Take 1 tablet (25 mg total) by mouth 3 (three) times daily as needed for Dizziness.  Dispense: 30 tablet; Refill: 0    Nausea  -     ondansetron (ZOFRAN-ODT) 4 MG TbDL; Take 1 tablet (4 mg total) by mouth every 8 (eight) hours as needed (nausea/vomit).  Dispense: 20 tablet; Refill: 0    Gastroesophageal reflux disease, esophagitis presence not specified  -     Discontinue: pantoprazole (PROTONIX) 40 MG tablet; Take 1 tablet (40 mg total) by mouth once daily.  Dispense: 30 tablet; Refill: 0      Follow up with your doctor in a few days as needed.  Return to the urgent care or go to the ER if symptoms get worse.    Eduin Kunz,  MD

## 2018-07-03 NOTE — PATIENT INSTRUCTIONS
Heat Exhaustion  Heat exhaustion is a condition that develops during prolonged exposure to heat. It is more likely to occur during strenuous activity, such as exercise or manual labor. Symptoms include a fast heartbeat, excess sweating, extreme tiredness, muscle cramps, headache, and weakness. They may also include stomach cramps, nausea, and vomiting. The person may be lightheaded and dizzy, and may even faint.  Treatment for heat exhaustion involves cooling the body down and replacing lost fluids, electrolytes, and salts. Cooling may be done with fans, cold cloths, or a cold-water bath. Fluids are best replaced by drinking electrolyte solution, a sports drink, or water with 2 teaspoons of salt added for each 8 ounces. If a person is very dehydrated, confused, or unable to drink, IV (intravenous) fluids will likely be needed.  Heat exhaustion can progress to a serious condition called heatstroke, so it should be treated right away.  Home care  Continue to drink extra cold fluids at home during the next 12 to 24 hours. Water, electrolyte solution, or sports drinks are advised. Avoid alcohol and caffeine.  Preventing heat illness  · Protect yourself from the heat. Wear lightweight, light-colored clothing and a broad-brimmed hat.  · Drink plenty of fluids before and during activity.  · Limit exercise in hot or very humid weather. If you have to be active in the heat, take frequent breaks to drink fluids and cool down.  · Avoid exercising when you are feeling ill.  · Watch for symptoms of heat illness such as exhaustion, excess sweating, and lightheadedness. If any occur, move to a cool place, rest, and drink cool fluids. Lying down with your legs raised slightly can help you recover.  · Avoid alcohol and caffeine.  Follow-up care  Follow up with your healthcare provider, or as advised.  When to seek medical advice  Call your healthcare provider right away for any of the following:  · Inability to keep fluids  down  · Vomiting or diarrhea  · Hot flushed skin  · Worsening symptoms or new symptoms  Call 911  Call 911 or emergency services right away for any of these symptoms of heatstroke:  · Confusion  · Irrational behavior  · Hallucinations  · Trouble walking  · Seizures  · Passing out  · Fever of 104°F (40°C) or higher  Date Last Reviewed: 5/1/2017 © 2000-2017 [employer]. [address] [place] [address], [place] [address]. All rights reserved. This information is not intended as a substitute for professional medical care. Always follow your healthcare professional's instructions.        Medicines for Acid Reflux  Your healthcare provider has told you that you have acid reflux. This condition causes stomach acid to wash up into your throat. For most people, acid reflux is troubling but not dangerous. But left untreated, acid reflux sometimes damages the esophagus. Medicines can help control acid reflux and limit your risk of future problems.  Medicines for acid reflux  Your healthcare provider may prescribe medicine to help treat your acid reflux. Medicine will be based on your symptoms and any test results. Your provider will explain how to take your medicine. You will also be told about possible side effects.  Reducing stomach acid  Your provider may suggest antacids that you can buy over the counter. Antacids can give fast relief. Or you may be told to take a type of medicine called H2 blockers. These are available over the counter and by prescription (for higher doses).  Blocking stomach acid  In more severe cases, your healthcare provider may suggest stronger medicines such as proton pump inhibitors (PPIs). These keep the stomach from making acid. They are often prescribed for long-term use.  Other medicines  In some cases medicines to reduce or block stomach acid may not work. Then you may be switched to another type of medicine that helps your stomach empty better.     Date Last Reviewed: 10/1/2016  ©  2475-7087 [employer]. [address] [place] [address], [place] [address]. All rights reserved. This information is not intended as a substitute for professional medical care. Always follow your healthcare professional's instructions.    Follow up with your doctor in a few days as needed.  Return to the urgent care or go to the ER if symptoms get worse.    Eduin Kunz MD

## 2018-07-13 ENCOUNTER — OFFICE VISIT (OUTPATIENT)
Dept: INTERNAL MEDICINE | Facility: CLINIC | Age: 43
End: 2018-07-13
Payer: COMMERCIAL

## 2018-07-13 VITALS
DIASTOLIC BLOOD PRESSURE: 88 MMHG | BODY MASS INDEX: 24.63 KG/M2 | TEMPERATURE: 99 F | SYSTOLIC BLOOD PRESSURE: 137 MMHG | RESPIRATION RATE: 18 BRPM | HEART RATE: 80 BPM | WEIGHT: 162.5 LBS | HEIGHT: 68 IN

## 2018-07-13 DIAGNOSIS — E03.4 HYPOTHYROIDISM DUE TO ACQUIRED ATROPHY OF THYROID: ICD-10-CM

## 2018-07-13 DIAGNOSIS — E78.00 HYPERCHOLESTEREMIA: ICD-10-CM

## 2018-07-13 DIAGNOSIS — H81.10 BENIGN PAROXYSMAL POSITIONAL VERTIGO, UNSPECIFIED LATERALITY: Primary | ICD-10-CM

## 2018-07-13 PROCEDURE — 3008F BODY MASS INDEX DOCD: CPT | Mod: CPTII,S$GLB,, | Performed by: INTERNAL MEDICINE

## 2018-07-13 PROCEDURE — 99999 PR PBB SHADOW E&M-EST. PATIENT-LVL III: CPT | Mod: PBBFAC,,, | Performed by: INTERNAL MEDICINE

## 2018-07-13 PROCEDURE — 99214 OFFICE O/P EST MOD 30 MIN: CPT | Mod: S$GLB,,, | Performed by: INTERNAL MEDICINE

## 2018-07-13 RX ORDER — MECLIZINE HYDROCHLORIDE 25 MG/1
25 TABLET ORAL 3 TIMES DAILY PRN
Qty: 30 TABLET | Refills: 1 | Status: SHIPPED | OUTPATIENT
Start: 2018-07-13 | End: 2018-11-19

## 2018-07-13 NOTE — PROGRESS NOTES
Subjective:       Patient ID: Srinivas Snyder Jr. is a 42 y.o. male.    Chief Complaint: Nausea and Dizziness    HPI   Pt with HLD, Hypothyroidism is here for evaluation of 1 week of slowly improving dizziness associated with changes in head/body position. The spells last a few seconds at a time. Staying still resolves the symptoms. Some relief with Meclizine which he was given at .   Review of Systems   Constitutional: Negative for activity change, appetite change, chills, diaphoresis, fatigue, fever and unexpected weight change.   HENT: Negative for postnasal drip, rhinorrhea, sinus pressure, sneezing, sore throat, trouble swallowing and voice change.    Respiratory: Negative for cough, shortness of breath and wheezing.    Cardiovascular: Negative for chest pain, palpitations and leg swelling.   Gastrointestinal: Negative for abdominal pain, blood in stool, constipation, diarrhea, nausea and vomiting.   Genitourinary: Negative for dysuria.   Musculoskeletal: Negative for arthralgias and myalgias.   Skin: Negative for rash and wound.   Allergic/Immunologic: Negative for environmental allergies and food allergies.   Neurological: Positive for dizziness. Negative for tremors, seizures, syncope, facial asymmetry, speech difficulty, weakness, light-headedness, numbness and headaches.   Hematological: Negative for adenopathy. Does not bruise/bleed easily.       Objective:      Physical Exam   Constitutional: He is oriented to person, place, and time. He appears well-developed and well-nourished. No distress.   HENT:   Head: Normocephalic and atraumatic.   Right Ear: External ear normal.   Left Ear: External ear normal.   Nose: Nose normal.   Mouth/Throat: Oropharynx is clear and moist. No oropharyngeal exudate.   Eyes: Conjunctivae and EOM are normal. Pupils are equal, round, and reactive to light. Right eye exhibits no discharge. Left eye exhibits no discharge. No scleral icterus.   Neck: Normal range of  motion. Neck supple. No JVD present.   Cardiovascular: Normal rate, regular rhythm and normal heart sounds.    No murmur heard.  Pulmonary/Chest: Effort normal and breath sounds normal. No respiratory distress. He has no wheezes. He has no rales.   Abdominal: Soft. Bowel sounds are normal. There is no tenderness.   Musculoskeletal: He exhibits no edema.   Lymphadenopathy:     He has no cervical adenopathy.   Neurological: He is alert and oriented to person, place, and time.   Skin: Skin is warm and dry. No rash noted. He is not diaphoretic. No pallor.       Assessment:       1. Benign paroxysmal positional vertigo, unspecified laterality    2. Hypercholesteremia    3. Hypothyroidism due to acquired atrophy of thyroid        Plan:    1. Refill Meclizine 25 mg TID PRN   2. Stable on Lipitor   3. Controlled on Synthroid

## 2018-08-07 ENCOUNTER — APPOINTMENT (RX ONLY)
Dept: URBAN - METROPOLITAN AREA CLINIC 98 | Facility: CLINIC | Age: 43
Setting detail: DERMATOLOGY
End: 2018-08-07

## 2018-08-07 DIAGNOSIS — D485 NEOPLASM OF UNCERTAIN BEHAVIOR OF SKIN: ICD-10-CM

## 2018-08-07 DIAGNOSIS — L05.91 PILONIDAL CYST WITHOUT ABSCESS: ICD-10-CM

## 2018-08-07 PROBLEM — E03.9 HYPOTHYROIDISM, UNSPECIFIED: Status: ACTIVE | Noted: 2018-08-07

## 2018-08-07 PROBLEM — D48.5 NEOPLASM OF UNCERTAIN BEHAVIOR OF SKIN: Status: ACTIVE | Noted: 2018-08-07

## 2018-08-07 PROBLEM — E78.5 HYPERLIPIDEMIA, UNSPECIFIED: Status: ACTIVE | Noted: 2018-08-07

## 2018-08-07 PROCEDURE — ? BIOPSY BY SHAVE METHOD

## 2018-08-07 PROCEDURE — 11100: CPT

## 2018-08-07 PROCEDURE — ? PRESCRIPTION

## 2018-08-07 PROCEDURE — ? INCISION AND DRAINAGE

## 2018-08-07 PROCEDURE — 10060 I&D ABSCESS SIMPLE/SINGLE: CPT

## 2018-08-07 RX ORDER — CEPHALEXIN 500 MG/1
TABLET ORAL TID
Qty: 30 | Refills: 0 | Status: ERX | COMMUNITY
Start: 2018-08-07

## 2018-08-07 RX ADMIN — CEPHALEXIN: 500 TABLET ORAL at 18:55

## 2018-08-07 ASSESSMENT — LOCATION SIMPLE DESCRIPTION DERM
LOCATION SIMPLE: RIGHT WRIST
LOCATION SIMPLE: LEFT BUTTOCK

## 2018-08-07 ASSESSMENT — LOCATION DETAILED DESCRIPTION DERM
LOCATION DETAILED: LEFT BUTTOCK
LOCATION DETAILED: RIGHT DORSAL WRIST

## 2018-08-07 ASSESSMENT — LOCATION ZONE DERM
LOCATION ZONE: ARM
LOCATION ZONE: TRUNK

## 2018-08-07 NOTE — PROCEDURE: INCISION AND DRAINAGE
Detail Level: Detailed
Lesion Type: Cyst
Anesthesia Volume In Cc: 1
Curette: No
Method: 11 blade
Anesthesia Type: 1% Xylocaine without epinephrine
Post-Care Instructions: I reviewed with the patient in detail post-care instructions. Patient should keep wound covered and call the office should any redness, pain, swelling or worsening occur.
Preparation Text: The area was prepped in the usual clean fashion.
Consent was obtained and risks were reviewed including but not limited to delayed wound healing, infection, need for multiple I and D's, and pain.
Epidermal Sutures: 4-0 Ethilon
Drainage Type?: purulent
Suture Text: The incision was partially closed with
Dressing: dry sterile dressing
Curette Text (Optional): After the contents were expressed a curette was used to partially remove the cyst wall.
Drainage Amount?: minimal
Size Of Lesion In Cm (Optional But May Be Required For Some Insurances): 0
Epidermal Closure: simple interrupted
Wound Care: Petrolatum

## 2018-08-07 NOTE — PROCEDURE: BIOPSY BY SHAVE METHOD
Notification Instructions: Patient will be notified of biopsy results. However, patient instructed to call the office if not contacted within 2 weeks.
Wound Care: Petrolatum
Render Post-Care Instructions In Note?: no
Electrodesiccation Text: The wound bed was treated with electrodesiccation after the biopsy was performed.
Detail Level: Simple
Anesthesia Volume In Cc (Will Not Render If 0): 0.5
Hemostasis: Cheko's
Biopsy Type: H and E
Curettage Text: The wound bed was treated with curettage after the biopsy was performed.
Billing Type: Third-Party Bill
Lab: [id number]
Consent: Written consent was obtained and risks were reviewed including but not limited to scarring, infection, bleeding, scabbing, incomplete removal, nerve damage and allergy to anesthesia.
Post-Care Instructions: I reviewed with the patient in detail post-care instructions. Patient is to keep the biopsy site dry overnight, and then apply bacitracin twice daily until healed. Patient may apply hydrogen peroxide soaks to remove any crusting.
Biopsy Method: double edge Personna blade
Was A Bandage Applied: Yes
Electrodesiccation And Curettage Text: The wound bed was treated with electrodesiccation and curettage after the biopsy was performed.
Additional Anesthesia Volume In Cc (Will Not Render If 0): 0
Type Of Destruction Used: Curettage
Lab Facility: [id number]
Anesthesia Type: 1% lidocaine with epinephrine
Cryotherapy Text: The wound bed was treated with cryotherapy after the biopsy was performed.
Depth Of Biopsy: dermis
Dressing: bandage
Silver Nitrate Text: The wound bed was treated with silver nitrate after the biopsy was performed.

## 2018-11-12 ENCOUNTER — TELEPHONE (OUTPATIENT)
Dept: INTERNAL MEDICINE | Facility: CLINIC | Age: 43
End: 2018-11-12

## 2018-11-12 ENCOUNTER — NURSE TRIAGE (OUTPATIENT)
Dept: ADMINISTRATIVE | Facility: CLINIC | Age: 43
End: 2018-11-12

## 2018-11-12 DIAGNOSIS — Z00.00 ANNUAL PHYSICAL EXAM: Primary | ICD-10-CM

## 2018-11-12 NOTE — TELEPHONE ENCOUNTER
----- Message from Shira Hill sent at 11/12/2018  8:57 AM CST -----  Doctor appointment and lab have been scheduled.  Please link lab orders to the lab appointment.  Date of doctor appointment:  11/30  Physical or EP:  Physical   Date of lab appointment:  11/19  Comments:

## 2018-11-12 NOTE — TELEPHONE ENCOUNTER
Reason for Disposition   BP > 140/90 and is not taking BP medications    Protocols used: ST HIGH BLOOD PRESSURE-A-OH    Pt was calling to see if he could move his appt up from 11/30. States that his systolic BP has been ranging between 140-150. Last check of BP was yesterday and it was in the 140s. Has never been on HTN meds. Also reports that the tingling in his tongue has continued. Patient reports that he has seen his PCP for this in the past and does not remember what he was told to do about it. States that he feels like when his pressure is high his tongue tingles. Denies headache, blurry vision, dizziness, chest pain, or shortness of breath. Advised to follow up with MD within 2 weeks. Please contact patient if you feel patient needs to be seen sooner

## 2018-11-19 ENCOUNTER — LAB VISIT (OUTPATIENT)
Dept: LAB | Facility: HOSPITAL | Age: 43
End: 2018-11-19
Attending: INTERNAL MEDICINE
Payer: COMMERCIAL

## 2018-11-19 ENCOUNTER — OFFICE VISIT (OUTPATIENT)
Dept: URGENT CARE | Facility: CLINIC | Age: 43
End: 2018-11-19
Payer: COMMERCIAL

## 2018-11-19 VITALS
TEMPERATURE: 98 F | OXYGEN SATURATION: 98 % | RESPIRATION RATE: 16 BRPM | BODY MASS INDEX: 23.79 KG/M2 | DIASTOLIC BLOOD PRESSURE: 86 MMHG | HEART RATE: 82 BPM | SYSTOLIC BLOOD PRESSURE: 123 MMHG | WEIGHT: 157 LBS | HEIGHT: 68 IN

## 2018-11-19 DIAGNOSIS — Z00.00 ANNUAL PHYSICAL EXAM: ICD-10-CM

## 2018-11-19 DIAGNOSIS — J06.9 VIRAL URI WITH COUGH: Primary | ICD-10-CM

## 2018-11-19 LAB
ALBUMIN SERPL BCP-MCNC: 4 G/DL
ALP SERPL-CCNC: 89 U/L
ALT SERPL W/O P-5'-P-CCNC: 16 U/L
ANION GAP SERPL CALC-SCNC: 7 MMOL/L
AST SERPL-CCNC: 19 U/L
BASOPHILS # BLD AUTO: 0.04 K/UL
BASOPHILS NFR BLD: 0.6 %
BILIRUB SERPL-MCNC: 1.2 MG/DL
BUN SERPL-MCNC: 10 MG/DL
CALCIUM SERPL-MCNC: 8.7 MG/DL
CHLORIDE SERPL-SCNC: 104 MMOL/L
CHOLEST SERPL-MCNC: 124 MG/DL
CHOLEST/HDLC SERPL: 3.3 {RATIO}
CO2 SERPL-SCNC: 30 MMOL/L
CREAT SERPL-MCNC: 1.2 MG/DL
DIFFERENTIAL METHOD: ABNORMAL
EOSINOPHIL # BLD AUTO: 0.6 K/UL
EOSINOPHIL NFR BLD: 7.9 %
ERYTHROCYTE [DISTWIDTH] IN BLOOD BY AUTOMATED COUNT: 12.6 %
EST. GFR  (AFRICAN AMERICAN): >60 ML/MIN/1.73 M^2
EST. GFR  (NON AFRICAN AMERICAN): >60 ML/MIN/1.73 M^2
GLUCOSE SERPL-MCNC: 96 MG/DL
HCT VFR BLD AUTO: 44.2 %
HDLC SERPL-MCNC: 38 MG/DL
HDLC SERPL: 30.6 %
HGB BLD-MCNC: 14.2 G/DL
IMM GRANULOCYTES # BLD AUTO: 0.02 K/UL
IMM GRANULOCYTES NFR BLD AUTO: 0.3 %
LDLC SERPL CALC-MCNC: 65.2 MG/DL
LYMPHOCYTES # BLD AUTO: 1.6 K/UL
LYMPHOCYTES NFR BLD: 21.9 %
MCH RBC QN AUTO: 30.6 PG
MCHC RBC AUTO-ENTMCNC: 32.1 G/DL
MCV RBC AUTO: 95 FL
MONOCYTES # BLD AUTO: 0.7 K/UL
MONOCYTES NFR BLD: 9.4 %
NEUTROPHILS # BLD AUTO: 4.3 K/UL
NEUTROPHILS NFR BLD: 59.9 %
NONHDLC SERPL-MCNC: 86 MG/DL
NRBC BLD-RTO: 0 /100 WBC
PLATELET # BLD AUTO: 166 K/UL
PMV BLD AUTO: 11.8 FL
POTASSIUM SERPL-SCNC: 4.2 MMOL/L
PROT SERPL-MCNC: 6.8 G/DL
RBC # BLD AUTO: 4.64 M/UL
SODIUM SERPL-SCNC: 141 MMOL/L
TRIGL SERPL-MCNC: 104 MG/DL
TSH SERPL DL<=0.005 MIU/L-ACNC: 2.66 UIU/ML
WBC # BLD AUTO: 7.13 K/UL

## 2018-11-19 PROCEDURE — 80053 COMPREHEN METABOLIC PANEL: CPT

## 2018-11-19 PROCEDURE — 36415 COLL VENOUS BLD VENIPUNCTURE: CPT | Mod: PO

## 2018-11-19 PROCEDURE — 84443 ASSAY THYROID STIM HORMONE: CPT

## 2018-11-19 PROCEDURE — 96372 THER/PROPH/DIAG INJ SC/IM: CPT | Mod: S$GLB,,, | Performed by: NURSE PRACTITIONER

## 2018-11-19 PROCEDURE — 99214 OFFICE O/P EST MOD 30 MIN: CPT | Mod: 25,S$GLB,, | Performed by: NURSE PRACTITIONER

## 2018-11-19 PROCEDURE — 80061 LIPID PANEL: CPT

## 2018-11-19 PROCEDURE — 3008F BODY MASS INDEX DOCD: CPT | Mod: CPTII,S$GLB,, | Performed by: NURSE PRACTITIONER

## 2018-11-19 PROCEDURE — 85025 COMPLETE CBC W/AUTO DIFF WBC: CPT

## 2018-11-19 RX ORDER — BETAMETHASONE SODIUM PHOSPHATE AND BETAMETHASONE ACETATE 3; 3 MG/ML; MG/ML
6 INJECTION, SUSPENSION INTRA-ARTICULAR; INTRALESIONAL; INTRAMUSCULAR; SOFT TISSUE
Status: COMPLETED | OUTPATIENT
Start: 2018-11-19 | End: 2018-11-19

## 2018-11-19 RX ORDER — CODEINE PHOSPHATE AND GUAIFENESIN 10; 100 MG/5ML; MG/5ML
5 SOLUTION ORAL NIGHTLY PRN
Qty: 120 ML | Refills: 0 | Status: SHIPPED | OUTPATIENT
Start: 2018-11-19 | End: 2018-11-30

## 2018-11-19 RX ADMIN — BETAMETHASONE SODIUM PHOSPHATE AND BETAMETHASONE ACETATE 6 MG: 3; 3 INJECTION, SUSPENSION INTRA-ARTICULAR; INTRALESIONAL; INTRAMUSCULAR; SOFT TISSUE at 09:11

## 2018-11-19 NOTE — PATIENT INSTRUCTIONS
Cough syrup may make you drowsy. Do not drive while taking it.     You must understand that you've received an Urgent Care treatment only and that you may be released before all your medical problems are known or treated. You, the patient, will arrange for follow up care as instructed.  If your condition worsens we recommend that you receive another evaluation at the emergency room immediately or contact your primary medical clinics after hours call service to discuss your concerns.  Please return here or go to the Emergency Department for any concerns or worsening of condition.      Viral Upper Respiratory Illness (Adult)  You have a viral upper respiratory illness (URI), which is another term for the common cold. This illness is contagious during the first few days. It is spread through the air by coughing and sneezing. It may also be spread by direct contact (touching the sick person and then touching your own eyes, nose, or mouth). Frequent handwashing will decrease risk of spread. Most viral illnesses go away within 7 to 10 days with rest and simple home remedies. Sometimes the illness may last for several weeks. Antibiotics will not kill a virus, and they are generally not prescribed for this condition.    Home care  · If symptoms are severe, rest at home for the first 2 to 3 days. When you resume activity, don't let yourself get too tired.  · Avoid being exposed to cigarette smoke (yours or others).  · You may use acetaminophen or ibuprofen to control pain and fever, unless another medicine was prescribed. (Note: If you have chronic liver or kidney disease, have ever had a stomach ulcer or gastrointestinal bleeding, or are taking blood-thinning medicines, talk with your healthcare provider before using these medicines.) Aspirin should never be given to anyone under 18 years of age who is ill with a viral infection or fever. It may cause severe liver or brain damage.  · Your appetite may be poor, so a light  diet is fine. Avoid dehydration by drinking 6 to 8 glasses of fluids per day (water, soft drinks, juices, tea, or soup). Extra fluids will help loosen secretions in the nose and lungs.  · Over-the-counter cold medicines will not shorten the length of time youre sick, but they may be helpful for the following symptoms: cough, sore throat, and nasal and sinus congestion. (Note: Do not use decongestants if you have high blood pressure.)  Follow-up care  Follow up with your healthcare provider, or as advised.  When to seek medical advice  Call your healthcare provider right away if any of these occur:  · Cough with lots of colored sputum (mucus)  · Severe headache; face, neck, or ear pain  · Difficulty swallowing due to throat pain  · Fever of 100.4°F (38°C)  Call 911, or get immediate medical care  Call emergency services right away if any of these occur:  · Chest pain, shortness of breath, wheezing, or difficulty breathing  · Coughing up blood  · Inability to swallow due to throat pain  Date Last Reviewed: 9/13/2015 © 2000-2017 [employer]. [address] [place] [address]. All rights reserved. This information is not intended as a substitute for professional medical care. Always follow your healthcare professional's instructions.

## 2018-11-19 NOTE — PROGRESS NOTES
"Subjective:       Patient ID: Srinivas Snyder Jr. is a 43 y.o. male.    Vitals:  height is 5' 8" (1.727 m) and weight is 71.2 kg (157 lb). His oral temperature is 97.6 °F (36.4 °C). His blood pressure is 123/86 and his pulse is 82. His respiration is 16 and oxygen saturation is 98%.     Chief Complaint: Cough    States the cough is really bad at night.       Cough   This is a new problem. Episode onset: 1 week. The problem has been gradually worsening. The problem occurs every few minutes. The cough is non-productive. Associated symptoms include nasal congestion and a sore throat. Pertinent negatives include no chills, ear pain, eye redness, fever, hemoptysis, myalgias, rash, shortness of breath or wheezing. The symptoms are aggravated by lying down. He has tried OTC cough suppressant for the symptoms. The treatment provided mild relief.       Constitution: Negative for chills, sweating, fatigue and fever.   HENT: Positive for congestion, sinus pressure and sore throat. Negative for ear pain, sinus pain and voice change.    Neck: Negative for painful lymph nodes.   Eyes: Negative for eye redness.   Respiratory: Positive for cough. Negative for chest tightness, sputum production, bloody sputum, COPD, shortness of breath, stridor, wheezing and asthma.    Gastrointestinal: Negative for nausea and vomiting.   Musculoskeletal: Negative for muscle ache.   Skin: Negative for rash.   Allergic/Immunologic: Negative for seasonal allergies and asthma.   Hematologic/Lymphatic: Negative for swollen lymph nodes.       Objective:      Physical Exam   Constitutional: He is oriented to person, place, and time. He appears well-developed and well-nourished. He is cooperative.  Non-toxic appearance. He does not appear ill. No distress.   HENT:   Head: Normocephalic and atraumatic.   Right Ear: Hearing, external ear and ear canal normal. A middle ear effusion is present.   Left Ear: Hearing, external ear and ear canal normal. A " middle ear effusion is present.   Nose: Nose normal. No mucosal edema, rhinorrhea or nasal deformity. No epistaxis. Right sinus exhibits no maxillary sinus tenderness and no frontal sinus tenderness. Left sinus exhibits no maxillary sinus tenderness and no frontal sinus tenderness.   Mouth/Throat: Uvula is midline, oropharynx is clear and moist and mucous membranes are normal. No trismus in the jaw. Normal dentition. No uvula swelling. No posterior oropharyngeal erythema.   Eyes: Conjunctivae and lids are normal. No scleral icterus.   Sclera clear bilat   Neck: Trachea normal, full passive range of motion without pain and phonation normal. Neck supple.   Cardiovascular: Normal rate, regular rhythm, normal heart sounds, intact distal pulses and normal pulses.   Pulmonary/Chest: Effort normal and breath sounds normal. No respiratory distress.   Abdominal: Soft. Normal appearance and bowel sounds are normal. He exhibits no distension. There is no tenderness.   Musculoskeletal: Normal range of motion. He exhibits no edema or deformity.   Neurological: He is alert and oriented to person, place, and time. He exhibits normal muscle tone. Coordination normal.   Skin: Skin is warm, dry and intact. He is not diaphoretic. No pallor.   Psychiatric: He has a normal mood and affect. His speech is normal and behavior is normal. Judgment and thought content normal. Cognition and memory are normal.   Nursing note and vitals reviewed.      Assessment:       1. Viral URI with cough        Plan:         Viral URI with cough  -     betamethasone acetate-betamethasone sodium phosphate injection 6 mg  -     guaifenesin-codeine 100-10 mg/5 ml (TUSSI-ORGANIDIN NR)  mg/5 mL syrup; Take 5 mLs by mouth nightly as needed.  Dispense: 120 mL; Refill: 0            Patient Instructions   Cough syrup may make you drowsy. Do not drive while taking it.     You must understand that you've received an Urgent Care treatment only and that you may be  released before all your medical problems are known or treated. You, the patient, will arrange for follow up care as instructed.  If your condition worsens we recommend that you receive another evaluation at the emergency room immediately or contact your primary medical clinics after hours call service to discuss your concerns.  Please return here or go to the Emergency Department for any concerns or worsening of condition.      Viral Upper Respiratory Illness (Adult)  You have a viral upper respiratory illness (URI), which is another term for the common cold. This illness is contagious during the first few days. It is spread through the air by coughing and sneezing. It may also be spread by direct contact (touching the sick person and then touching your own eyes, nose, or mouth). Frequent handwashing will decrease risk of spread. Most viral illnesses go away within 7 to 10 days with rest and simple home remedies. Sometimes the illness may last for several weeks. Antibiotics will not kill a virus, and they are generally not prescribed for this condition.    Home care  · If symptoms are severe, rest at home for the first 2 to 3 days. When you resume activity, don't let yourself get too tired.  · Avoid being exposed to cigarette smoke (yours or others).  · You may use acetaminophen or ibuprofen to control pain and fever, unless another medicine was prescribed. (Note: If you have chronic liver or kidney disease, have ever had a stomach ulcer or gastrointestinal bleeding, or are taking blood-thinning medicines, talk with your healthcare provider before using these medicines.) Aspirin should never be given to anyone under 18 years of age who is ill with a viral infection or fever. It may cause severe liver or brain damage.  · Your appetite may be poor, so a light diet is fine. Avoid dehydration by drinking 6 to 8 glasses of fluids per day (water, soft drinks, juices, tea, or soup). Extra fluids will help loosen  secretions in the nose and lungs.  · Over-the-counter cold medicines will not shorten the length of time youre sick, but they may be helpful for the following symptoms: cough, sore throat, and nasal and sinus congestion. (Note: Do not use decongestants if you have high blood pressure.)  Follow-up care  Follow up with your healthcare provider, or as advised.  When to seek medical advice  Call your healthcare provider right away if any of these occur:  · Cough with lots of colored sputum (mucus)  · Severe headache; face, neck, or ear pain  · Difficulty swallowing due to throat pain  · Fever of 100.4°F (38°C)  Call 911, or get immediate medical care  Call emergency services right away if any of these occur:  · Chest pain, shortness of breath, wheezing, or difficulty breathing  · Coughing up blood  · Inability to swallow due to throat pain  Date Last Reviewed: 9/13/2015  © 1754-8381 [employer]. [address] [place] [address], [place] [address]. All rights reserved. This information is not intended as a substitute for professional medical care. Always follow your healthcare professional's instructions.

## 2018-11-30 ENCOUNTER — OFFICE VISIT (OUTPATIENT)
Dept: INTERNAL MEDICINE | Facility: CLINIC | Age: 43
End: 2018-11-30
Payer: COMMERCIAL

## 2018-11-30 VITALS
DIASTOLIC BLOOD PRESSURE: 86 MMHG | SYSTOLIC BLOOD PRESSURE: 124 MMHG | BODY MASS INDEX: 23.19 KG/M2 | WEIGHT: 153 LBS | HEIGHT: 68 IN | TEMPERATURE: 98 F

## 2018-11-30 DIAGNOSIS — Z00.00 ANNUAL PHYSICAL EXAM: Primary | ICD-10-CM

## 2018-11-30 DIAGNOSIS — E78.00 HYPERCHOLESTEREMIA: ICD-10-CM

## 2018-11-30 DIAGNOSIS — E03.4 HYPOTHYROIDISM DUE TO ACQUIRED ATROPHY OF THYROID: ICD-10-CM

## 2018-11-30 PROCEDURE — 99396 PREV VISIT EST AGE 40-64: CPT | Mod: S$GLB,,, | Performed by: INTERNAL MEDICINE

## 2018-11-30 PROCEDURE — 99999 PR PBB SHADOW E&M-EST. PATIENT-LVL III: CPT | Mod: PBBFAC,,, | Performed by: INTERNAL MEDICINE

## 2018-11-30 RX ORDER — LEVOTHYROXINE SODIUM 112 UG/1
112 TABLET ORAL DAILY
Qty: 90 TABLET | Refills: 3 | Status: SHIPPED | OUTPATIENT
Start: 2018-11-30 | End: 2020-06-16

## 2018-11-30 RX ORDER — ATORVASTATIN CALCIUM 20 MG/1
20 TABLET, FILM COATED ORAL DAILY
Qty: 90 TABLET | Refills: 3 | Status: SHIPPED | OUTPATIENT
Start: 2018-11-30 | End: 2018-12-30

## 2018-11-30 NOTE — PROGRESS NOTES
Subjective:       Patient ID: Srinivas Snyder Jr. is a 43 y.o. male.    Chief Complaint: Annual Exam (review labs)    HPI   43 y.o. Male here for annual exam.      Cholesterol: (needs)  Vaccines: Influenza (2018); Tetanus (2015)  Eye exam: declined     Exercise: no  Diet: regular     Past Medical History:    Anxiety                                                       Hyperlipidemia                                                Hypothyroidism                                              No past surgical history on file.  Social History    Marital status:              Spouse name:                       Years of education:                 Number of children: 1              Occupational History  Occupation          Employer            Comment               [employer] support                                   Social History Main Topics    Smoking status: Never Smoker                                                                 Smokeless status: Never Used                        Alcohol use: Yes           0.0 oz/week       0 Standard drinks or equivalent per week       Comment: Social     Drug use: No              Sexual activity: Yes               Partners with: Female     No Known Allergies  Review of Systems   Constitutional: Negative for activity change, appetite change, chills, diaphoresis, fatigue, fever and unexpected weight change.   HENT: Negative for congestion, mouth sores, postnasal drip, rhinorrhea, sinus pressure, sneezing, sore throat, trouble swallowing and voice change.    Eyes: Negative for discharge, itching and visual disturbance.   Respiratory: Negative for cough, chest tightness, shortness of breath and wheezing.    Cardiovascular: Negative for chest pain, palpitations and leg swelling.   Gastrointestinal: Negative for abdominal pain, blood in stool, constipation, diarrhea, nausea and vomiting.   Endocrine: Negative for cold intolerance and heat intolerance.   Genitourinary: Negative for  difficulty urinating, dysuria, flank pain, hematuria and urgency.   Musculoskeletal: Negative for arthralgias, back pain, myalgias and neck pain.   Skin: Negative for rash and wound.   Allergic/Immunologic: Negative for environmental allergies and food allergies.   Neurological: Negative for dizziness, tremors, seizures, syncope, weakness and headaches.   Hematological: Negative for adenopathy. Does not bruise/bleed easily.   Psychiatric/Behavioral: Negative for confusion, sleep disturbance and suicidal ideas. The patient is not nervous/anxious.        Objective:      Physical Exam   Constitutional: He is oriented to person, place, and time. He appears well-developed and well-nourished. No distress.   HENT:   Head: Normocephalic and atraumatic.   Right Ear: External ear normal.   Left Ear: External ear normal.   Nose: Nose normal.   Mouth/Throat: Oropharynx is clear and moist. No oropharyngeal exudate.   Eyes: Conjunctivae and EOM are normal. Pupils are equal, round, and reactive to light. Right eye exhibits no discharge. Left eye exhibits no discharge. No scleral icterus.   Neck: Normal range of motion. Neck supple. No JVD present. No thyromegaly present.   Cardiovascular: Normal rate, regular rhythm, normal heart sounds and intact distal pulses.   No murmur heard.  Pulmonary/Chest: Effort normal and breath sounds normal. No respiratory distress. He has no wheezes. He has no rales.   Abdominal: Soft. Bowel sounds are normal. He exhibits no distension. There is no tenderness. There is no guarding.   Musculoskeletal: He exhibits no edema.   Lymphadenopathy:     He has no cervical adenopathy.   Neurological: He is alert and oriented to person, place, and time. No cranial nerve deficit. Coordination normal.   Skin: Skin is warm and dry. No rash noted. He is not diaphoretic. No pallor.   Psychiatric: He has a normal mood and affect. Judgment normal.       Assessment:       1. Annual physical exam    2. Hypothyroidism  due to acquired atrophy of thyroid    3. Hypercholesteremia        Plan:    Complete blood work ordered    Vaccines: Influenza (2018); Tetanus (2015)   Eye exam: declined      1. Hypothyroidism- continue Synthroid 112 mcg daily   2. HLD- stable on Lipitor 20 mg daily   3. F/u in 1 yr or PRN

## 2018-12-17 ENCOUNTER — OFFICE VISIT (OUTPATIENT)
Dept: URGENT CARE | Facility: CLINIC | Age: 43
End: 2018-12-17
Payer: COMMERCIAL

## 2018-12-17 VITALS
HEIGHT: 68 IN | RESPIRATION RATE: 18 BRPM | BODY MASS INDEX: 23.04 KG/M2 | OXYGEN SATURATION: 100 % | WEIGHT: 152 LBS | SYSTOLIC BLOOD PRESSURE: 133 MMHG | TEMPERATURE: 98 F | DIASTOLIC BLOOD PRESSURE: 97 MMHG | HEART RATE: 86 BPM

## 2018-12-17 DIAGNOSIS — I10 HYPERTENSION, UNSPECIFIED TYPE: Primary | ICD-10-CM

## 2018-12-17 PROCEDURE — 93010 ELECTROCARDIOGRAM REPORT: CPT | Mod: S$GLB,,, | Performed by: INTERNAL MEDICINE

## 2018-12-17 PROCEDURE — 93005 ELECTROCARDIOGRAM TRACING: CPT | Mod: S$GLB,,, | Performed by: NURSE PRACTITIONER

## 2018-12-18 NOTE — PROGRESS NOTES
"Subjective:       Patient ID: Srinivas Snyder Jr. is a 43 y.o. male.    Vitals:  height is 5' 8" (1.727 m) and weight is 68.9 kg (152 lb). His temperature is 97.6 °F (36.4 °C). His blood pressure is 133/97 (abnormal) and his pulse is 86. His respiration is 18 and oxygen saturation is 100%.     Chief Complaint: Hypertension (140/100 bp today. Pt states tongue numbness)    Hypertension   This is a new problem. The current episode started yesterday. The problem is uncontrolled. Pertinent negatives include no blurred vision, chest pain, headaches or shortness of breath. (Tongue numbness  )       Constitution: Negative for chills, fatigue and fever.   HENT: Negative for congestion and sore throat.    Neck: Negative for painful lymph nodes.   Cardiovascular: Negative for chest pain and leg swelling.   Eyes: Negative for double vision and blurred vision.   Respiratory: Negative for cough and shortness of breath.    Gastrointestinal: Negative for nausea, vomiting and diarrhea.   Genitourinary: Negative for dysuria, frequency and urgency.   Musculoskeletal: Negative for joint pain, joint swelling, muscle cramps and muscle ache.   Skin: Negative for color change, pale and rash.   Allergic/Immunologic: Negative for seasonal allergies.   Neurological: Negative for dizziness, history of vertigo, light-headedness, passing out and headaches.   Hematologic/Lymphatic: Negative for swollen lymph nodes, easy bruising/bleeding and history of blood clots. Does not bruise/bleed easily.   Psychiatric/Behavioral: Negative for nervous/anxious, sleep disturbance and depression. The patient is not nervous/anxious.        Objective:      Physical Exam   Constitutional: He is oriented to person, place, and time. He appears well-developed and well-nourished. He is cooperative.  Non-toxic appearance. He does not appear ill. No distress.   HENT:   Head: Normocephalic and atraumatic.   Right Ear: Hearing, tympanic membrane, external ear and " ear canal normal.   Left Ear: Hearing, tympanic membrane, external ear and ear canal normal.   Nose: Nose normal. No mucosal edema, rhinorrhea or nasal deformity. No epistaxis. Right sinus exhibits no maxillary sinus tenderness and no frontal sinus tenderness. Left sinus exhibits no maxillary sinus tenderness and no frontal sinus tenderness.   Mouth/Throat: Uvula is midline, oropharynx is clear and moist and mucous membranes are normal. No trismus in the jaw. Normal dentition. No uvula swelling. No posterior oropharyngeal erythema.   Eyes: Conjunctivae and lids are normal. Right eye exhibits no discharge. Left eye exhibits no discharge. No scleral icterus.   Sclera clear bilat   Neck: Trachea normal, normal range of motion, full passive range of motion without pain and phonation normal. Neck supple.   Cardiovascular: Normal rate, regular rhythm, normal heart sounds, intact distal pulses and normal pulses.   Pulmonary/Chest: Effort normal and breath sounds normal. No respiratory distress.   Abdominal: Soft. Normal appearance and bowel sounds are normal. He exhibits no distension, no pulsatile midline mass and no mass. There is no tenderness.   Musculoskeletal: Normal range of motion. He exhibits no edema or deformity.   Neurological: He is alert and oriented to person, place, and time. He exhibits normal muscle tone. Coordination normal.   Skin: Skin is warm, dry and intact. He is not diaphoretic. No pallor.   Psychiatric: He has a normal mood and affect. His speech is normal and behavior is normal. Judgment and thought content normal. Cognition and memory are normal.   Nursing note and vitals reviewed.      Assessment:       1. Hypertension, unspecified type        Plan:       Patient to watch caffeine usage and preform stress relief exercises.     Hypertension, unspecified type  -     IN OFFICE EKG 12-LEAD (to Muse)      Patient Instructions   Patient educated on starting blood pressure journal and following up  with primary.    EKG: normal EKG, normal sinus rhythm, unchanged from previous tracings.    Patient to go to the ER if starting to have chest pain or shortness of breath.           High Blood Pressure, To Be Confirmed, No Treatment  Your blood pressure today was higher than normal. Sometimes anxiety or pain can cause a temporary rise in blood pressure. It later returns to normal. Blood pressure that is high only one time doesnt mean that you have high blood pressure (hypertension). High blood pressure is a chronic illness. But you should have your blood pressure measured again within the next few days to find out if its still high.    A blood pressure reading is made up of two numbers: a higher number over a lower number. The top number is the systolic pressure. The bottom number is the diastolic pressure. A normal blood pressure is a systolic pressure of less than 120 over a diastolic pressure of less than 80. You will see your blood pressure readings written together. For example, a person with a systolic pressure of 118 and a diastolic pressure of 78 will have 118/78 written in the medical record.     High blood pressure is when either the top number is 140 or higher, or the bottom number is 90 or higher. This must be the result when taking your blood pressure a number of times.   The blood pressures between normal and high are called prehypertension. This is systolic pressure of 120 to 140 or diastolic pressure of 80 to 89. Prehypertension means you are at risk of getting high blood pressure. It's a warning sign. The information gives you a chance to  make lifestyle changes such as weight loss, exercise, and quitting smoking, that can keep your blood pressure from going higher. You should have your blood pressure checked regularly to be sure it isnt rising.  Home care  To track your blood pressure, your provider may ask you to come into the office at different times and on different days. If your healthcare  provider asks you to check your readings at home, ask him or her what times of the day to test and for how many days. Before you leave the office, ask your provider to show you how to take your blood pressure and be sure to ask questions if you don't understand something.  Consider buying an automatic blood pressure monitor. Ask your provider for a recommendation. You can buy blood pressure monitors at most pharmacies.  The American Heart Association recommends the following guidelines for home blood pressure monitoring:  · Don't smoke or drink coffee for 30 minutes before taking your blood pressure.  · Go to the bathroom before the test.  · Relax for 5 minutes before taking the measurement.  · Sit with your back supported (don't sit on a couch or soft chair); keep your feet on the floor uncrossed. Place your arm on a solid flat surface (like a table) with the upper part of the arm at heart level. Place the middle of the cuff directly above the eye of the elbow. Check the monitor's instruction manual for an illustration.  · Take multiple readings. When you measure, take 2 to 3 readings one minute apart and record all of the results.  · Take your blood pressure at the same time every day, or as your healthcare provider recommends.  · Record the date, time, and blood pressure reading.  · Take the record with you to your next medical appointment. If your blood pressure monitor has a built-in memory, simply take the monitor with you to your next appointment.  · Call your provider if you have several high readings. Don't be frightened by a single high blood pressure reading, but if you get several high readings, check in with your healthcare provider.  · Note: When blood pressure reaches a systolic (top number) of 180 or higher OR diastolic (bottom number) of 110 or higher, seek emergency medical treatment.  Follow-up care  Keep all of your follow up appointments. If your blood pressure is high (more than 120 over 80) on  2 out of 3 days, you will need to follow up with your healthcare provider for more evaluation and treatment.  Dont put this off! High blood pressure can be treated. High blood pressure thats not treated raises your risk for heart attack and stroke.  When to seek medical advice  Call your healthcare provider right away if any of these occur:  · Blood pressure reaches a systolic (top number) of 180 or higher, OR diastolic (bottom number) of 110 or higher  · Chest pain or shortness of breath  · Severe headache  · Throbbing or rushing sound in the ears  · Nosebleed  · Sudden severe pain in your belly (abdomen)  · Extreme drowsiness, confusion, or fainting  · Dizziness or dizziness with spinning sensation (vertigo)  · Weakness of an arm or leg or one side of the face  · You have problems speaking or seeing   Date Last Reviewed: 12/1/2016  © 6449-3484 [employer]. [address] [place] [address]. All rights reserved. This information is not intended as a substitute for professional medical care. Always follow your healthcare professional's instructions.      Please drink plenty of fluids.  Please get plenty of rest.  Please return here or go to the Emergency Department for any concerns or worsening of condition.  If you were prescribed antibiotics, please take them to completion.  If not allergic, please take over the counter Tylenol (Acetaminophen) as directed for control of pain and/or fever.  Motrin (advil) or Alleve may help a fever, but they may also worsen your Nausea and Vomiting.    Please follow up with your primary care doctor or specialist as needed.    Amor Reeder, DO  [phone]    If you  smoke, please stop smoking.

## 2018-12-18 NOTE — PATIENT INSTRUCTIONS
Patient educated on starting blood pressure journal and following up with primary.    EKG: normal EKG, normal sinus rhythm, unchanged from previous tracings.    Patient to go to the ER if starting to have chest pain or shortness of breath.           High Blood Pressure, To Be Confirmed, No Treatment  Your blood pressure today was higher than normal. Sometimes anxiety or pain can cause a temporary rise in blood pressure. It later returns to normal. Blood pressure that is high only one time doesnt mean that you have high blood pressure (hypertension). High blood pressure is a chronic illness. But you should have your blood pressure measured again within the next few days to find out if its still high.    A blood pressure reading is made up of two numbers: a higher number over a lower number. The top number is the systolic pressure. The bottom number is the diastolic pressure. A normal blood pressure is a systolic pressure of less than 120 over a diastolic pressure of less than 80. You will see your blood pressure readings written together. For example, a person with a systolic pressure of 118 and a diastolic pressure of 78 will have 118/78 written in the medical record.     High blood pressure is when either the top number is 140 or higher, or the bottom number is 90 or higher. This must be the result when taking your blood pressure a number of times.   The blood pressures between normal and high are called prehypertension. This is systolic pressure of 120 to 140 or diastolic pressure of 80 to 89. Prehypertension means you are at risk of getting high blood pressure. It's a warning sign. The information gives you a chance to  make lifestyle changes such as weight loss, exercise, and quitting smoking, that can keep your blood pressure from going higher. You should have your blood pressure checked regularly to be sure it isnt rising.  Home care  To track your blood pressure, your provider may ask you to come into the  office at different times and on different days. If your healthcare provider asks you to check your readings at home, ask him or her what times of the day to test and for how many days. Before you leave the office, ask your provider to show you how to take your blood pressure and be sure to ask questions if you don't understand something.  Consider buying an automatic blood pressure monitor. Ask your provider for a recommendation. You can buy blood pressure monitors at most pharmacies.  The American Heart Association recommends the following guidelines for home blood pressure monitoring:  · Don't smoke or drink coffee for 30 minutes before taking your blood pressure.  · Go to the bathroom before the test.  · Relax for 5 minutes before taking the measurement.  · Sit with your back supported (don't sit on a couch or soft chair); keep your feet on the floor uncrossed. Place your arm on a solid flat surface (like a table) with the upper part of the arm at heart level. Place the middle of the cuff directly above the eye of the elbow. Check the monitor's instruction manual for an illustration.  · Take multiple readings. When you measure, take 2 to 3 readings one minute apart and record all of the results.  · Take your blood pressure at the same time every day, or as your healthcare provider recommends.  · Record the date, time, and blood pressure reading.  · Take the record with you to your next medical appointment. If your blood pressure monitor has a built-in memory, simply take the monitor with you to your next appointment.  · Call your provider if you have several high readings. Don't be frightened by a single high blood pressure reading, but if you get several high readings, check in with your healthcare provider.  · Note: When blood pressure reaches a systolic (top number) of 180 or higher OR diastolic (bottom number) of 110 or higher, seek emergency medical treatment.  Follow-up care  Keep all of your follow up  appointments. If your blood pressure is high (more than 120 over 80) on 2 out of 3 days, you will need to follow up with your healthcare provider for more evaluation and treatment.  Dont put this off! High blood pressure can be treated. High blood pressure thats not treated raises your risk for heart attack and stroke.  When to seek medical advice  Call your healthcare provider right away if any of these occur:  · Blood pressure reaches a systolic (top number) of 180 or higher, OR diastolic (bottom number) of 110 or higher  · Chest pain or shortness of breath  · Severe headache  · Throbbing or rushing sound in the ears  · Nosebleed  · Sudden severe pain in your belly (abdomen)  · Extreme drowsiness, confusion, or fainting  · Dizziness or dizziness with spinning sensation (vertigo)  · Weakness of an arm or leg or one side of the face  · You have problems speaking or seeing   Date Last Reviewed: 12/1/2016  © 5877-7200 [employer]. [address] [place] [address]. All rights reserved. This information is not intended as a substitute for professional medical care. Always follow your healthcare professional's instructions.      Please drink plenty of fluids.  Please get plenty of rest.  Please return here or go to the Emergency Department for any concerns or worsening of condition.  If you were prescribed antibiotics, please take them to completion.  If not allergic, please take over the counter Tylenol (Acetaminophen) as directed for control of pain and/or fever.  Motrin (advil) or Alleve may help a fever, but they may also worsen your Nausea and Vomiting.    Please follow up with your primary care doctor or specialist as needed.    Amor Reeder, DO  [phone]    If you  smoke, please stop smoking.

## 2019-04-29 ENCOUNTER — OFFICE VISIT (OUTPATIENT)
Dept: INTERNAL MEDICINE | Facility: CLINIC | Age: 44
End: 2019-04-29
Payer: COMMERCIAL

## 2019-04-29 VITALS
BODY MASS INDEX: 23.29 KG/M2 | TEMPERATURE: 99 F | RESPIRATION RATE: 18 BRPM | WEIGHT: 153.69 LBS | HEIGHT: 68 IN | HEART RATE: 78 BPM | SYSTOLIC BLOOD PRESSURE: 118 MMHG | DIASTOLIC BLOOD PRESSURE: 88 MMHG

## 2019-04-29 DIAGNOSIS — E78.00 HYPERCHOLESTEREMIA: ICD-10-CM

## 2019-04-29 DIAGNOSIS — F41.9 ANXIETY: ICD-10-CM

## 2019-04-29 DIAGNOSIS — E03.4 HYPOTHYROIDISM DUE TO ACQUIRED ATROPHY OF THYROID: Primary | ICD-10-CM

## 2019-04-29 PROCEDURE — 3008F BODY MASS INDEX DOCD: CPT | Mod: CPTII,S$GLB,, | Performed by: INTERNAL MEDICINE

## 2019-04-29 PROCEDURE — 3074F PR MOST RECENT SYSTOLIC BLOOD PRESSURE < 130 MM HG: ICD-10-PCS | Mod: CPTII,S$GLB,, | Performed by: INTERNAL MEDICINE

## 2019-04-29 PROCEDURE — 99214 OFFICE O/P EST MOD 30 MIN: CPT | Mod: S$GLB,,, | Performed by: INTERNAL MEDICINE

## 2019-04-29 PROCEDURE — 3074F SYST BP LT 130 MM HG: CPT | Mod: CPTII,S$GLB,, | Performed by: INTERNAL MEDICINE

## 2019-04-29 PROCEDURE — 3079F PR MOST RECENT DIASTOLIC BLOOD PRESSURE 80-89 MM HG: ICD-10-PCS | Mod: CPTII,S$GLB,, | Performed by: INTERNAL MEDICINE

## 2019-04-29 PROCEDURE — 3008F PR BODY MASS INDEX (BMI) DOCUMENTED: ICD-10-PCS | Mod: CPTII,S$GLB,, | Performed by: INTERNAL MEDICINE

## 2019-04-29 PROCEDURE — 99999 PR PBB SHADOW E&M-EST. PATIENT-LVL III: ICD-10-PCS | Mod: PBBFAC,,, | Performed by: INTERNAL MEDICINE

## 2019-04-29 PROCEDURE — 99999 PR PBB SHADOW E&M-EST. PATIENT-LVL III: CPT | Mod: PBBFAC,,, | Performed by: INTERNAL MEDICINE

## 2019-04-29 PROCEDURE — 3079F DIAST BP 80-89 MM HG: CPT | Mod: CPTII,S$GLB,, | Performed by: INTERNAL MEDICINE

## 2019-04-29 PROCEDURE — 99214 PR OFFICE/OUTPT VISIT, EST, LEVL IV, 30-39 MIN: ICD-10-PCS | Mod: S$GLB,,, | Performed by: INTERNAL MEDICINE

## 2019-04-29 RX ORDER — ATORVASTATIN CALCIUM 20 MG/1
20 TABLET, FILM COATED ORAL DAILY
COMMUNITY
Start: 2019-03-15 | End: 2020-06-16

## 2019-04-29 RX ORDER — ESCITALOPRAM OXALATE 20 MG/1
TABLET ORAL
Qty: 45 TABLET | Refills: 0 | OUTPATIENT
Start: 2019-04-29

## 2019-04-29 RX ORDER — ESCITALOPRAM OXALATE 20 MG/1
TABLET ORAL
Qty: 30 TABLET | Refills: 0 | Status: SHIPPED | OUTPATIENT
Start: 2019-04-29 | End: 2019-06-27 | Stop reason: SDUPTHER

## 2019-04-29 NOTE — PROGRESS NOTES
Subjective:       Patient ID: Srinivas Snyder Jr. is a 43 y.o. male.    Chief Complaint: Blood Pressure Check; Tingling (tongue, finger tips, nose ); and Anxiety    HPI   Pt with HLD, Hypothyroidism is here for f/u. Pt also c/o chronic anxiety a few days out of the week. A/s of mood instability, excessive worrying, irritable. No SI/HI.   Review of Systems   Constitutional: Negative for activity change, appetite change, chills, diaphoresis, fatigue, fever and unexpected weight change.   HENT: Negative for postnasal drip, rhinorrhea, sinus pressure, sneezing, sore throat, trouble swallowing and voice change.    Respiratory: Negative for cough, shortness of breath and wheezing.    Cardiovascular: Negative for chest pain, palpitations and leg swelling.   Gastrointestinal: Negative for abdominal pain, blood in stool, constipation, diarrhea, nausea and vomiting.   Genitourinary: Negative for dysuria.   Musculoskeletal: Negative for arthralgias and myalgias.   Skin: Negative for rash and wound.   Allergic/Immunologic: Negative for environmental allergies and food allergies.   Hematological: Negative for adenopathy. Does not bruise/bleed easily.   Psychiatric/Behavioral: Negative for self-injury, sleep disturbance and suicidal ideas. The patient is nervous/anxious.        Objective:      Physical Exam   Constitutional: He is oriented to person, place, and time. He appears well-developed and well-nourished. No distress.   HENT:   Head: Normocephalic and atraumatic.   Eyes: Pupils are equal, round, and reactive to light. Conjunctivae and EOM are normal. Right eye exhibits no discharge. Left eye exhibits no discharge. No scleral icterus.   Neck: Normal range of motion. Neck supple. No JVD present.   Cardiovascular: Normal rate, regular rhythm and normal heart sounds.   No murmur heard.  Pulmonary/Chest: Effort normal and breath sounds normal. No respiratory distress. He has no wheezes. He has no rales.   Abdominal: Soft.  Bowel sounds are normal. There is no tenderness.   Musculoskeletal: He exhibits no edema.   Lymphadenopathy:     He has no cervical adenopathy.   Neurological: He is alert and oriented to person, place, and time.   Skin: Skin is warm and dry. No rash noted. He is not diaphoretic. No pallor.       Assessment:       1. Hypothyroidism due to acquired atrophy of thyroid    2. Hypercholesteremia    3. Anxiety        Plan:    1. Hypothyroidism- continue Synthroid 112 mcg daily   2. HLD- stable on Lipitor 20 mg daily   3. Anxiety- Rx Lexapro 20 mg qHS   4. F/u in 1 month for anxiety

## 2019-06-28 RX ORDER — ESCITALOPRAM OXALATE 20 MG/1
TABLET ORAL
Qty: 90 TABLET | Refills: 3 | Status: SHIPPED | OUTPATIENT
Start: 2019-06-28 | End: 2019-07-09

## 2019-07-09 ENCOUNTER — OFFICE VISIT (OUTPATIENT)
Dept: INTERNAL MEDICINE | Facility: CLINIC | Age: 44
End: 2019-07-09
Payer: COMMERCIAL

## 2019-07-09 VITALS
WEIGHT: 158 LBS | SYSTOLIC BLOOD PRESSURE: 118 MMHG | RESPIRATION RATE: 16 BRPM | HEART RATE: 76 BPM | DIASTOLIC BLOOD PRESSURE: 84 MMHG | BODY MASS INDEX: 23.95 KG/M2 | TEMPERATURE: 98 F | HEIGHT: 68 IN

## 2019-07-09 DIAGNOSIS — E78.00 HYPERCHOLESTEREMIA: ICD-10-CM

## 2019-07-09 DIAGNOSIS — F98.8 ATTENTION DEFICIT DISORDER, UNSPECIFIED HYPERACTIVITY PRESENCE: Primary | ICD-10-CM

## 2019-07-09 DIAGNOSIS — E03.4 HYPOTHYROIDISM DUE TO ACQUIRED ATROPHY OF THYROID: ICD-10-CM

## 2019-07-09 PROCEDURE — 3079F PR MOST RECENT DIASTOLIC BLOOD PRESSURE 80-89 MM HG: ICD-10-PCS | Mod: CPTII,S$GLB,, | Performed by: INTERNAL MEDICINE

## 2019-07-09 PROCEDURE — 3008F PR BODY MASS INDEX (BMI) DOCUMENTED: ICD-10-PCS | Mod: CPTII,S$GLB,, | Performed by: INTERNAL MEDICINE

## 2019-07-09 PROCEDURE — 99214 PR OFFICE/OUTPT VISIT, EST, LEVL IV, 30-39 MIN: ICD-10-PCS | Mod: S$GLB,,, | Performed by: INTERNAL MEDICINE

## 2019-07-09 PROCEDURE — 99214 OFFICE O/P EST MOD 30 MIN: CPT | Mod: S$GLB,,, | Performed by: INTERNAL MEDICINE

## 2019-07-09 PROCEDURE — 3008F BODY MASS INDEX DOCD: CPT | Mod: CPTII,S$GLB,, | Performed by: INTERNAL MEDICINE

## 2019-07-09 PROCEDURE — 3074F PR MOST RECENT SYSTOLIC BLOOD PRESSURE < 130 MM HG: ICD-10-PCS | Mod: CPTII,S$GLB,, | Performed by: INTERNAL MEDICINE

## 2019-07-09 PROCEDURE — 3074F SYST BP LT 130 MM HG: CPT | Mod: CPTII,S$GLB,, | Performed by: INTERNAL MEDICINE

## 2019-07-09 PROCEDURE — 3079F DIAST BP 80-89 MM HG: CPT | Mod: CPTII,S$GLB,, | Performed by: INTERNAL MEDICINE

## 2019-07-09 PROCEDURE — 99999 PR PBB SHADOW E&M-EST. PATIENT-LVL III: CPT | Mod: PBBFAC,,, | Performed by: INTERNAL MEDICINE

## 2019-07-09 PROCEDURE — 99999 PR PBB SHADOW E&M-EST. PATIENT-LVL III: ICD-10-PCS | Mod: PBBFAC,,, | Performed by: INTERNAL MEDICINE

## 2019-07-09 RX ORDER — LISDEXAMFETAMINE DIMESYLATE 30 MG/1
30 CAPSULE ORAL EVERY MORNING
Qty: 30 CAPSULE | Refills: 0 | Status: SHIPPED | OUTPATIENT
Start: 2019-09-07 | End: 2019-10-17 | Stop reason: SDUPTHER

## 2019-07-09 RX ORDER — LISDEXAMFETAMINE DIMESYLATE 30 MG/1
30 CAPSULE ORAL EVERY MORNING
Qty: 30 CAPSULE | Refills: 0 | Status: SHIPPED | OUTPATIENT
Start: 2019-08-08 | End: 2019-09-07

## 2019-07-09 RX ORDER — LISDEXAMFETAMINE DIMESYLATE 30 MG/1
30 CAPSULE ORAL EVERY MORNING
Qty: 30 CAPSULE | Refills: 0 | Status: SHIPPED | OUTPATIENT
Start: 2019-07-09 | End: 2019-08-08

## 2019-07-09 NOTE — PROGRESS NOTES
Subjective:       Patient ID: Srinivas Snyder Jr. is a 43 y.o. male.    Chief Complaint: refills (wants to resume add meds for work. )    HPI   Pt with ADD, Hypothyroidism and HLD is here for f/u. Pt would like to restart Vyvanse 2/2 increasing demands at his job and needing better focus to complete tasks.   Review of Systems   Constitutional: Negative for activity change, appetite change, chills, diaphoresis, fatigue, fever and unexpected weight change.   HENT: Negative for postnasal drip, rhinorrhea, sinus pressure, sneezing, sore throat, trouble swallowing and voice change.    Respiratory: Negative for cough, shortness of breath and wheezing.    Cardiovascular: Negative for chest pain, palpitations and leg swelling.   Gastrointestinal: Negative for abdominal pain, blood in stool, constipation, diarrhea, nausea and vomiting.   Genitourinary: Negative for dysuria.   Musculoskeletal: Negative for arthralgias and myalgias.   Skin: Negative for rash and wound.   Allergic/Immunologic: Negative for environmental allergies and food allergies.   Hematological: Negative for adenopathy. Does not bruise/bleed easily.   Psychiatric/Behavioral: Positive for decreased concentration.       Objective:      Physical Exam   Constitutional: He is oriented to person, place, and time. He appears well-developed and well-nourished. No distress.   HENT:   Head: Normocephalic and atraumatic.   Eyes: Pupils are equal, round, and reactive to light. Conjunctivae and EOM are normal. Right eye exhibits no discharge. Left eye exhibits no discharge. No scleral icterus.   Neck: Normal range of motion. Neck supple. No JVD present.   Cardiovascular: Normal rate, regular rhythm and normal heart sounds.   No murmur heard.  Pulmonary/Chest: Effort normal and breath sounds normal. No respiratory distress. He has no wheezes. He has no rales.   Musculoskeletal: He exhibits no edema.   Lymphadenopathy:     He has no cervical adenopathy.    Neurological: He is alert and oriented to person, place, and time.   Skin: Skin is warm and dry. No rash noted. He is not diaphoretic. No pallor.       Assessment:       1. Attention deficit disorder, unspecified hyperactivity presence    2. Hypothyroidism due to acquired atrophy of thyroid    3. Hypercholesteremia        Plan:    1. Rx Vyvanse 30 mg qam   2. Stable on Synthroid   3. Controlled on Lipitor

## 2019-10-16 ENCOUNTER — PATIENT OUTREACH (OUTPATIENT)
Dept: ADMINISTRATIVE | Facility: OTHER | Age: 44
End: 2019-10-16

## 2019-10-17 ENCOUNTER — OFFICE VISIT (OUTPATIENT)
Dept: INTERNAL MEDICINE | Facility: CLINIC | Age: 44
End: 2019-10-17
Payer: COMMERCIAL

## 2019-10-17 VITALS
TEMPERATURE: 98 F | BODY MASS INDEX: 23.56 KG/M2 | HEIGHT: 68 IN | HEART RATE: 87 BPM | RESPIRATION RATE: 18 BRPM | SYSTOLIC BLOOD PRESSURE: 110 MMHG | WEIGHT: 155.44 LBS | DIASTOLIC BLOOD PRESSURE: 82 MMHG

## 2019-10-17 DIAGNOSIS — F98.8 ATTENTION DEFICIT DISORDER, UNSPECIFIED HYPERACTIVITY PRESENCE: Primary | ICD-10-CM

## 2019-10-17 DIAGNOSIS — E03.4 HYPOTHYROIDISM DUE TO ACQUIRED ATROPHY OF THYROID: ICD-10-CM

## 2019-10-17 DIAGNOSIS — E78.00 HYPERCHOLESTEREMIA: ICD-10-CM

## 2019-10-17 PROCEDURE — 3074F PR MOST RECENT SYSTOLIC BLOOD PRESSURE < 130 MM HG: ICD-10-PCS | Mod: CPTII,S$GLB,, | Performed by: INTERNAL MEDICINE

## 2019-10-17 PROCEDURE — 99999 PR PBB SHADOW E&M-EST. PATIENT-LVL III: CPT | Mod: PBBFAC,,, | Performed by: INTERNAL MEDICINE

## 2019-10-17 PROCEDURE — 99214 PR OFFICE/OUTPT VISIT, EST, LEVL IV, 30-39 MIN: ICD-10-PCS | Mod: S$GLB,,, | Performed by: INTERNAL MEDICINE

## 2019-10-17 PROCEDURE — 99214 OFFICE O/P EST MOD 30 MIN: CPT | Mod: S$GLB,,, | Performed by: INTERNAL MEDICINE

## 2019-10-17 PROCEDURE — 3079F PR MOST RECENT DIASTOLIC BLOOD PRESSURE 80-89 MM HG: ICD-10-PCS | Mod: CPTII,S$GLB,, | Performed by: INTERNAL MEDICINE

## 2019-10-17 PROCEDURE — 3074F SYST BP LT 130 MM HG: CPT | Mod: CPTII,S$GLB,, | Performed by: INTERNAL MEDICINE

## 2019-10-17 PROCEDURE — 3008F PR BODY MASS INDEX (BMI) DOCUMENTED: ICD-10-PCS | Mod: CPTII,S$GLB,, | Performed by: INTERNAL MEDICINE

## 2019-10-17 PROCEDURE — 3079F DIAST BP 80-89 MM HG: CPT | Mod: CPTII,S$GLB,, | Performed by: INTERNAL MEDICINE

## 2019-10-17 PROCEDURE — 99999 PR PBB SHADOW E&M-EST. PATIENT-LVL III: ICD-10-PCS | Mod: PBBFAC,,, | Performed by: INTERNAL MEDICINE

## 2019-10-17 PROCEDURE — 3008F BODY MASS INDEX DOCD: CPT | Mod: CPTII,S$GLB,, | Performed by: INTERNAL MEDICINE

## 2019-10-17 RX ORDER — LISDEXAMFETAMINE DIMESYLATE 30 MG/1
30 CAPSULE ORAL EVERY MORNING
Qty: 30 CAPSULE | Refills: 0 | Status: SHIPPED | OUTPATIENT
Start: 2019-10-17 | End: 2019-11-16

## 2019-10-17 RX ORDER — LISDEXAMFETAMINE DIMESYLATE 30 MG/1
30 CAPSULE ORAL EVERY MORNING
Qty: 30 CAPSULE | Refills: 0 | Status: SHIPPED | OUTPATIENT
Start: 2019-11-17 | End: 2021-07-22 | Stop reason: SDUPTHER

## 2019-10-17 RX ORDER — LISDEXAMFETAMINE DIMESYLATE 30 MG/1
30 CAPSULE ORAL EVERY MORNING
Qty: 30 CAPSULE | Refills: 0 | Status: SHIPPED | OUTPATIENT
Start: 2019-12-17 | End: 2021-07-22 | Stop reason: SDUPTHER

## 2019-10-17 NOTE — PROGRESS NOTES
Subjective:       Patient ID: Srinivas Snyder Jr. is a 43 y.o. male.    Chief Complaint: Medication Refill and Skin Check (upper torso)    HPI   Pt with ADD, Hypothyroidism and HLD is here for f/u. He is requesting refills of his Vyvanse which is controlling his symptoms.   Review of Systems   Constitutional: Negative for activity change, appetite change, chills, diaphoresis, fatigue, fever and unexpected weight change.   HENT: Negative for postnasal drip, rhinorrhea, sinus pressure, sneezing, sore throat, trouble swallowing and voice change.    Respiratory: Negative for cough, shortness of breath and wheezing.    Cardiovascular: Negative for chest pain, palpitations and leg swelling.   Gastrointestinal: Negative for abdominal pain, blood in stool, constipation, diarrhea, nausea and vomiting.   Genitourinary: Negative for dysuria.   Musculoskeletal: Negative for arthralgias and myalgias.   Skin: Negative for rash and wound.   Allergic/Immunologic: Negative for environmental allergies and food allergies.   Hematological: Negative for adenopathy. Does not bruise/bleed easily.   Psychiatric/Behavioral: Positive for dysphoric mood.       Objective:      Physical Exam   Constitutional: He is oriented to person, place, and time. He appears well-developed and well-nourished. No distress.   HENT:   Head: Normocephalic and atraumatic.   Eyes: Pupils are equal, round, and reactive to light. Conjunctivae and EOM are normal. Right eye exhibits no discharge. Left eye exhibits no discharge. No scleral icterus.   Neck: Normal range of motion. Neck supple. No JVD present.   Cardiovascular: Normal rate, regular rhythm and normal heart sounds.   No murmur heard.  Pulmonary/Chest: Effort normal and breath sounds normal. No respiratory distress. He has no wheezes. He has no rales.   Musculoskeletal: He exhibits no edema.   Lymphadenopathy:     He has no cervical adenopathy.   Neurological: He is alert and oriented to person,  place, and time.   Skin: Skin is warm and dry. No rash noted. He is not diaphoretic. No pallor.       Assessment:       1. Attention deficit disorder, unspecified hyperactivity presence    2. Hypothyroidism due to acquired atrophy of thyroid    3. Hypercholesteremia        Plan:    1. Stable, refill Vyvanse 30 mg qam   2. Stable on Synthroid   3. Controlled on Lipitor

## 2019-12-22 ENCOUNTER — OFFICE VISIT (OUTPATIENT)
Dept: URGENT CARE | Facility: CLINIC | Age: 44
End: 2019-12-22
Payer: COMMERCIAL

## 2019-12-22 VITALS
BODY MASS INDEX: 23.49 KG/M2 | OXYGEN SATURATION: 100 % | HEIGHT: 68 IN | DIASTOLIC BLOOD PRESSURE: 90 MMHG | HEART RATE: 86 BPM | SYSTOLIC BLOOD PRESSURE: 133 MMHG | RESPIRATION RATE: 18 BRPM | WEIGHT: 155 LBS | TEMPERATURE: 98 F

## 2019-12-22 DIAGNOSIS — J06.9 URI, ACUTE: ICD-10-CM

## 2019-12-22 DIAGNOSIS — R05.9 COUGH: Primary | ICD-10-CM

## 2019-12-22 DIAGNOSIS — J30.1 SEASONAL ALLERGIC RHINITIS DUE TO POLLEN: ICD-10-CM

## 2019-12-22 LAB
CTP QC/QA: YES
FLUAV AG NPH QL: NEGATIVE
FLUBV AG NPH QL: NEGATIVE

## 2019-12-22 PROCEDURE — 96372 PR INJECTION,THERAP/PROPH/DIAG2ST, IM OR SUBCUT: ICD-10-PCS | Mod: S$GLB,,, | Performed by: FAMILY MEDICINE

## 2019-12-22 PROCEDURE — 99213 OFFICE O/P EST LOW 20 MIN: CPT | Mod: 25,S$GLB,, | Performed by: FAMILY MEDICINE

## 2019-12-22 PROCEDURE — 87804 INFLUENZA ASSAY W/OPTIC: CPT | Mod: QW,S$GLB,, | Performed by: FAMILY MEDICINE

## 2019-12-22 PROCEDURE — 99213 PR OFFICE/OUTPT VISIT, EST, LEVL III, 20-29 MIN: ICD-10-PCS | Mod: 25,S$GLB,, | Performed by: FAMILY MEDICINE

## 2019-12-22 PROCEDURE — 87804 POCT INFLUENZA A/B: ICD-10-PCS | Mod: QW,S$GLB,, | Performed by: FAMILY MEDICINE

## 2019-12-22 PROCEDURE — 96372 THER/PROPH/DIAG INJ SC/IM: CPT | Mod: S$GLB,,, | Performed by: FAMILY MEDICINE

## 2019-12-22 RX ORDER — BETAMETHASONE SODIUM PHOSPHATE AND BETAMETHASONE ACETATE 3; 3 MG/ML; MG/ML
9 INJECTION, SUSPENSION INTRA-ARTICULAR; INTRALESIONAL; INTRAMUSCULAR; SOFT TISSUE
Status: COMPLETED | OUTPATIENT
Start: 2019-12-22 | End: 2019-12-22

## 2019-12-22 RX ADMIN — BETAMETHASONE SODIUM PHOSPHATE AND BETAMETHASONE ACETATE 9 MG: 3; 3 INJECTION, SUSPENSION INTRA-ARTICULAR; INTRALESIONAL; INTRAMUSCULAR; SOFT TISSUE at 05:12

## 2019-12-22 NOTE — PROGRESS NOTES
"Subjective:       Patient ID: Srinivas Snyder Jr. is a 44 y.o. male.    Vitals:  height is 5' 8" (1.727 m) and weight is 70.3 kg (155 lb). His oral temperature is 97.7 °F (36.5 °C). His blood pressure is 133/90 (abnormal) and his pulse is 86. His respiration is 18 and oxygen saturation is 100%.     Chief Complaint: URI    44-year-old male with complaint of severe sinus congestion postnasal drip and sneezing and coughing x6 days no fever no chills    URI    This is a new problem. The current episode started in the past 7 days. The problem has been gradually worsening. There has been no fever. Associated symptoms include congestion, coughing, headaches, rhinorrhea, sinus pain, sneezing and a sore throat. Pertinent negatives include no ear pain, nausea, rash, vomiting or wheezing. He has tried decongestant and NSAIDs for the symptoms. The treatment provided no relief.       Constitution: Negative for chills, sweating, fatigue and fever.   HENT: Positive for congestion, sinus pain and sore throat. Negative for ear pain, sinus pressure and voice change.    Neck: Negative for painful lymph nodes.   Eyes: Negative for eye redness.   Respiratory: Positive for cough. Negative for chest tightness, sputum production, bloody sputum, COPD, shortness of breath, stridor, wheezing and asthma.    Gastrointestinal: Negative for nausea and vomiting.   Musculoskeletal: Negative for muscle ache.   Skin: Negative for rash.   Allergic/Immunologic: Positive for sneezing. Negative for seasonal allergies and asthma.   Neurological: Positive for headaches.   Hematologic/Lymphatic: Negative for swollen lymph nodes.       Objective:      Physical Exam   Constitutional: He is oriented to person, place, and time. He appears well-developed and well-nourished. He is cooperative.  Non-toxic appearance. He does not appear ill. No distress.   HENT:   Head: Normocephalic and atraumatic.   Right Ear: Hearing, tympanic membrane, external ear and " ear canal normal.   Left Ear: Hearing, tympanic membrane, external ear and ear canal normal.   Nose: Nose normal. No mucosal edema, rhinorrhea or nasal deformity. No epistaxis. Right sinus exhibits no maxillary sinus tenderness and no frontal sinus tenderness. Left sinus exhibits no maxillary sinus tenderness and no frontal sinus tenderness.   Mouth/Throat: Uvula is midline, oropharynx is clear and moist and mucous membranes are normal. No trismus in the jaw. Normal dentition. No uvula swelling. No posterior oropharyngeal erythema.   Throat is clear TMsno lymphadenopathy   Eyes: Conjunctivae and lids are normal. Right eye exhibits no discharge. Left eye exhibits no discharge. No scleral icterus.   Neck: Trachea normal, normal range of motion, full passive range of motion without pain and phonation normal. Neck supple.   Cardiovascular: Normal rate, regular rhythm, normal heart sounds, intact distal pulses and normal pulses.   Pulmonary/Chest: Effort normal and breath sounds normal. No stridor. No respiratory distress. He has no wheezes. He has no rales. He exhibits no tenderness.   Abdominal: Soft. Normal appearance and bowel sounds are normal. He exhibits no distension, no pulsatile midline mass and no mass. There is no tenderness.   Musculoskeletal: Normal range of motion. He exhibits no edema or deformity.   Neurological: He is alert and oriented to person, place, and time. He exhibits normal muscle tone. Coordination normal.   Skin: Skin is warm, dry, intact, not diaphoretic and not pale.   Psychiatric: He has a normal mood and affect. His speech is normal and behavior is normal. Judgment and thought content normal. Cognition and memory are normal.   Nursing note and vitals reviewed.        Assessment:       1. Cough    2. Seasonal allergic rhinitis due to pollen    3. URI, acute        Plan:         Cough  -     POCT Influenza A/B    Seasonal allergic rhinitis due to pollen    URI, acute    Other orders  -      betamethasone acetate-betamethasone sodium phosphate injection 9 mg          Patient advised to take Claritin or Zyrtec 1 a day Tylenol or Motrin every 6 hr p.r.n. fluids and rest

## 2020-12-22 ENCOUNTER — PATIENT OUTREACH (OUTPATIENT)
Dept: ADMINISTRATIVE | Facility: OTHER | Age: 45
End: 2020-12-22

## 2021-01-04 ENCOUNTER — PATIENT MESSAGE (OUTPATIENT)
Dept: ADMINISTRATIVE | Facility: HOSPITAL | Age: 46
End: 2021-01-04

## 2021-01-05 ENCOUNTER — OFFICE VISIT (OUTPATIENT)
Dept: INTERNAL MEDICINE | Facility: CLINIC | Age: 46
End: 2021-01-05
Payer: COMMERCIAL

## 2021-01-05 DIAGNOSIS — J01.10 ACUTE NON-RECURRENT FRONTAL SINUSITIS: ICD-10-CM

## 2021-01-05 DIAGNOSIS — J20.9 ACUTE BRONCHITIS, UNSPECIFIED ORGANISM: Primary | ICD-10-CM

## 2021-01-05 PROCEDURE — 99214 OFFICE O/P EST MOD 30 MIN: CPT | Mod: 95,,, | Performed by: INTERNAL MEDICINE

## 2021-01-05 PROCEDURE — 99214 PR OFFICE/OUTPT VISIT, EST, LEVL IV, 30-39 MIN: ICD-10-PCS | Mod: 95,,, | Performed by: INTERNAL MEDICINE

## 2021-01-05 RX ORDER — CODEINE PHOSPHATE AND GUAIFENESIN 10; 100 MG/5ML; MG/5ML
5 SOLUTION ORAL 3 TIMES DAILY PRN
Qty: 236 ML | Refills: 0 | Status: SHIPPED | OUTPATIENT
Start: 2021-01-05 | End: 2021-01-15

## 2021-01-05 RX ORDER — AZITHROMYCIN 250 MG/1
TABLET, FILM COATED ORAL
Qty: 6 TABLET | Refills: 0 | Status: SHIPPED | OUTPATIENT
Start: 2021-01-05 | End: 2021-01-10

## 2021-05-03 DIAGNOSIS — E03.4 HYPOTHYROIDISM DUE TO ACQUIRED ATROPHY OF THYROID: ICD-10-CM

## 2021-05-03 DIAGNOSIS — E78.00 HYPERCHOLESTEREMIA: Primary | ICD-10-CM

## 2021-05-06 RX ORDER — LEVOTHYROXINE SODIUM 112 UG/1
112 TABLET ORAL DAILY
Qty: 90 TABLET | Refills: 3 | Status: SHIPPED | OUTPATIENT
Start: 2021-05-06 | End: 2021-07-22

## 2021-05-06 RX ORDER — ATORVASTATIN CALCIUM 20 MG/1
20 TABLET, FILM COATED ORAL DAILY
Qty: 90 TABLET | Refills: 3 | Status: SHIPPED | OUTPATIENT
Start: 2021-05-06 | End: 2022-05-12

## 2021-06-02 ENCOUNTER — TELEPHONE (OUTPATIENT)
Dept: INTERNAL MEDICINE | Facility: CLINIC | Age: 46
End: 2021-06-02

## 2021-06-02 DIAGNOSIS — Z00.00 ANNUAL PHYSICAL EXAM: Primary | ICD-10-CM

## 2021-06-02 DIAGNOSIS — E78.00 HYPERCHOLESTEREMIA: ICD-10-CM

## 2021-06-02 DIAGNOSIS — E03.4 HYPOTHYROIDISM DUE TO ACQUIRED ATROPHY OF THYROID: ICD-10-CM

## 2021-07-19 ENCOUNTER — LAB VISIT (OUTPATIENT)
Dept: LAB | Facility: HOSPITAL | Age: 46
End: 2021-07-19
Payer: COMMERCIAL

## 2021-07-19 DIAGNOSIS — Z00.00 ANNUAL PHYSICAL EXAM: ICD-10-CM

## 2021-07-19 DIAGNOSIS — E78.00 HYPERCHOLESTEREMIA: ICD-10-CM

## 2021-07-19 DIAGNOSIS — E03.4 HYPOTHYROIDISM DUE TO ACQUIRED ATROPHY OF THYROID: ICD-10-CM

## 2021-07-19 LAB
ALBUMIN SERPL BCP-MCNC: 4.1 G/DL (ref 3.5–5.2)
ALP SERPL-CCNC: 82 U/L (ref 55–135)
ALT SERPL W/O P-5'-P-CCNC: 29 U/L (ref 10–44)
ANION GAP SERPL CALC-SCNC: 11 MMOL/L (ref 8–16)
AST SERPL-CCNC: 22 U/L (ref 10–40)
BASOPHILS # BLD AUTO: 0.03 K/UL (ref 0–0.2)
BASOPHILS NFR BLD: 0.5 % (ref 0–1.9)
BILIRUB SERPL-MCNC: 1 MG/DL (ref 0.1–1)
BUN SERPL-MCNC: 16 MG/DL (ref 6–20)
CALCIUM SERPL-MCNC: 9.5 MG/DL (ref 8.7–10.5)
CHLORIDE SERPL-SCNC: 105 MMOL/L (ref 95–110)
CHOLEST SERPL-MCNC: 177 MG/DL (ref 120–199)
CHOLEST/HDLC SERPL: 4.3 {RATIO} (ref 2–5)
CO2 SERPL-SCNC: 27 MMOL/L (ref 23–29)
COMPLEXED PSA SERPL-MCNC: 0.79 NG/ML (ref 0–4)
CREAT SERPL-MCNC: 1.5 MG/DL (ref 0.5–1.4)
DIFFERENTIAL METHOD: ABNORMAL
EOSINOPHIL # BLD AUTO: 0.4 K/UL (ref 0–0.5)
EOSINOPHIL NFR BLD: 6.6 % (ref 0–8)
ERYTHROCYTE [DISTWIDTH] IN BLOOD BY AUTOMATED COUNT: 12.5 % (ref 11.5–14.5)
EST. GFR  (AFRICAN AMERICAN): >60 ML/MIN/1.73 M^2
EST. GFR  (NON AFRICAN AMERICAN): 56.2 ML/MIN/1.73 M^2
GLUCOSE SERPL-MCNC: 97 MG/DL (ref 70–110)
HCT VFR BLD AUTO: 44.8 % (ref 40–54)
HDLC SERPL-MCNC: 41 MG/DL (ref 40–75)
HDLC SERPL: 23.2 % (ref 20–50)
HGB BLD-MCNC: 14.6 G/DL (ref 14–18)
IMM GRANULOCYTES # BLD AUTO: 0.03 K/UL (ref 0–0.04)
IMM GRANULOCYTES NFR BLD AUTO: 0.5 % (ref 0–0.5)
LDLC SERPL CALC-MCNC: 97.6 MG/DL (ref 63–159)
LYMPHOCYTES # BLD AUTO: 1.6 K/UL (ref 1–4.8)
LYMPHOCYTES NFR BLD: 24.9 % (ref 18–48)
MCH RBC QN AUTO: 31.1 PG (ref 27–31)
MCHC RBC AUTO-ENTMCNC: 32.6 G/DL (ref 32–36)
MCV RBC AUTO: 95 FL (ref 82–98)
MONOCYTES # BLD AUTO: 0.6 K/UL (ref 0.3–1)
MONOCYTES NFR BLD: 9.1 % (ref 4–15)
NEUTROPHILS # BLD AUTO: 3.8 K/UL (ref 1.8–7.7)
NEUTROPHILS NFR BLD: 58.4 % (ref 38–73)
NONHDLC SERPL-MCNC: 136 MG/DL
NRBC BLD-RTO: 0 /100 WBC
PLATELET # BLD AUTO: 180 K/UL (ref 150–450)
PMV BLD AUTO: 12.2 FL (ref 9.2–12.9)
POTASSIUM SERPL-SCNC: 3.9 MMOL/L (ref 3.5–5.1)
PROT SERPL-MCNC: 7.2 G/DL (ref 6–8.4)
RBC # BLD AUTO: 4.7 M/UL (ref 4.6–6.2)
SODIUM SERPL-SCNC: 143 MMOL/L (ref 136–145)
T4 FREE SERPL-MCNC: 1.03 NG/DL (ref 0.71–1.51)
TRIGL SERPL-MCNC: 192 MG/DL (ref 30–150)
TSH SERPL DL<=0.005 MIU/L-ACNC: 18.8 UIU/ML (ref 0.4–4)
WBC # BLD AUTO: 6.5 K/UL (ref 3.9–12.7)

## 2021-07-19 PROCEDURE — 84153 ASSAY OF PSA TOTAL: CPT | Performed by: INTERNAL MEDICINE

## 2021-07-19 PROCEDURE — 85025 COMPLETE CBC W/AUTO DIFF WBC: CPT | Performed by: INTERNAL MEDICINE

## 2021-07-19 PROCEDURE — 36415 COLL VENOUS BLD VENIPUNCTURE: CPT | Mod: PO | Performed by: INTERNAL MEDICINE

## 2021-07-19 PROCEDURE — 84443 ASSAY THYROID STIM HORMONE: CPT | Performed by: INTERNAL MEDICINE

## 2021-07-19 PROCEDURE — 84439 ASSAY OF FREE THYROXINE: CPT | Performed by: INTERNAL MEDICINE

## 2021-07-19 PROCEDURE — 80061 LIPID PANEL: CPT | Performed by: INTERNAL MEDICINE

## 2021-07-19 PROCEDURE — 80053 COMPREHEN METABOLIC PANEL: CPT | Performed by: INTERNAL MEDICINE

## 2021-07-22 ENCOUNTER — OFFICE VISIT (OUTPATIENT)
Dept: INTERNAL MEDICINE | Facility: CLINIC | Age: 46
End: 2021-07-22
Payer: COMMERCIAL

## 2021-07-22 VITALS
WEIGHT: 175.25 LBS | BODY MASS INDEX: 26.56 KG/M2 | RESPIRATION RATE: 14 BRPM | TEMPERATURE: 98 F | SYSTOLIC BLOOD PRESSURE: 110 MMHG | DIASTOLIC BLOOD PRESSURE: 64 MMHG | HEART RATE: 58 BPM | HEIGHT: 68 IN

## 2021-07-22 DIAGNOSIS — F98.8 ATTENTION DEFICIT DISORDER, UNSPECIFIED HYPERACTIVITY PRESENCE: ICD-10-CM

## 2021-07-22 DIAGNOSIS — E78.00 HYPERCHOLESTEREMIA: ICD-10-CM

## 2021-07-22 DIAGNOSIS — E03.4 HYPOTHYROIDISM DUE TO ACQUIRED ATROPHY OF THYROID: ICD-10-CM

## 2021-07-22 DIAGNOSIS — Z00.00 ANNUAL PHYSICAL EXAM: Primary | ICD-10-CM

## 2021-07-22 PROCEDURE — 99396 PR PREVENTIVE VISIT,EST,40-64: ICD-10-PCS | Mod: S$GLB,,, | Performed by: INTERNAL MEDICINE

## 2021-07-22 PROCEDURE — 99396 PREV VISIT EST AGE 40-64: CPT | Mod: S$GLB,,, | Performed by: INTERNAL MEDICINE

## 2021-07-22 PROCEDURE — 99999 PR PBB SHADOW E&M-EST. PATIENT-LVL III: ICD-10-PCS | Mod: PBBFAC,,, | Performed by: INTERNAL MEDICINE

## 2021-07-22 PROCEDURE — 3008F PR BODY MASS INDEX (BMI) DOCUMENTED: ICD-10-PCS | Mod: CPTII,S$GLB,, | Performed by: INTERNAL MEDICINE

## 2021-07-22 PROCEDURE — 99999 PR PBB SHADOW E&M-EST. PATIENT-LVL III: CPT | Mod: PBBFAC,,, | Performed by: INTERNAL MEDICINE

## 2021-07-22 PROCEDURE — 3008F BODY MASS INDEX DOCD: CPT | Mod: CPTII,S$GLB,, | Performed by: INTERNAL MEDICINE

## 2021-07-22 PROCEDURE — 1126F PR PAIN SEVERITY QUANTIFIED, NO PAIN PRESENT: ICD-10-PCS | Mod: CPTII,S$GLB,, | Performed by: INTERNAL MEDICINE

## 2021-07-22 PROCEDURE — 1126F AMNT PAIN NOTED NONE PRSNT: CPT | Mod: CPTII,S$GLB,, | Performed by: INTERNAL MEDICINE

## 2021-07-22 RX ORDER — LISDEXAMFETAMINE DIMESYLATE 30 MG/1
30 CAPSULE ORAL EVERY MORNING
Qty: 30 CAPSULE | Refills: 0 | Status: SHIPPED | OUTPATIENT
Start: 2021-08-22 | End: 2021-11-28 | Stop reason: SDUPTHER

## 2021-07-22 RX ORDER — LEVOTHYROXINE SODIUM 137 UG/1
137 TABLET ORAL
Qty: 30 TABLET | Refills: 1 | Status: SHIPPED | OUTPATIENT
Start: 2021-07-22 | End: 2021-07-23 | Stop reason: SDUPTHER

## 2021-07-22 RX ORDER — LISDEXAMFETAMINE DIMESYLATE 30 MG/1
30 CAPSULE ORAL EVERY MORNING
Qty: 30 CAPSULE | Refills: 0 | Status: SHIPPED | OUTPATIENT
Start: 2021-07-22 | End: 2021-12-03 | Stop reason: SDUPTHER

## 2021-08-09 ENCOUNTER — PATIENT MESSAGE (OUTPATIENT)
Dept: INTERNAL MEDICINE | Facility: CLINIC | Age: 46
End: 2021-08-09

## 2021-08-26 ENCOUNTER — PATIENT MESSAGE (OUTPATIENT)
Dept: ORTHOPEDICS | Facility: CLINIC | Age: 46
End: 2021-08-26

## 2021-08-27 DIAGNOSIS — F98.8 ATTENTION DEFICIT DISORDER, UNSPECIFIED HYPERACTIVITY PRESENCE: ICD-10-CM

## 2021-08-27 RX ORDER — LISDEXAMFETAMINE DIMESYLATE 30 MG/1
30 CAPSULE ORAL EVERY MORNING
Qty: 30 CAPSULE | Refills: 0 | Status: CANCELLED | OUTPATIENT
Start: 2021-08-27

## 2021-09-07 ENCOUNTER — PATIENT MESSAGE (OUTPATIENT)
Dept: ORTHOPEDICS | Facility: CLINIC | Age: 46
End: 2021-09-07

## 2021-09-14 ENCOUNTER — PATIENT MESSAGE (OUTPATIENT)
Dept: ORTHOPEDICS | Facility: CLINIC | Age: 46
End: 2021-09-14

## 2021-09-14 ENCOUNTER — HOSPITAL ENCOUNTER (OUTPATIENT)
Dept: RADIOLOGY | Facility: HOSPITAL | Age: 46
Discharge: HOME | End: 2021-09-14
Attending: ORTHOPAEDIC SURGERY
Payer: COMMERCIAL

## 2021-09-14 ENCOUNTER — OFFICE VISIT (OUTPATIENT)
Dept: ORTHOPEDICS | Facility: CLINIC | Age: 46
End: 2021-09-14
Payer: COMMERCIAL

## 2021-09-14 VITALS — WEIGHT: 169 LBS | HEIGHT: 68 IN | BODY MASS INDEX: 25.61 KG/M2

## 2021-09-14 DIAGNOSIS — G89.29 CHRONIC BILATERAL LOW BACK PAIN WITHOUT SCIATICA: Primary | ICD-10-CM

## 2021-09-14 DIAGNOSIS — M54.50 CHRONIC BILATERAL LOW BACK PAIN WITHOUT SCIATICA: Primary | ICD-10-CM

## 2021-09-14 DIAGNOSIS — M51.36 DDD (DEGENERATIVE DISC DISEASE), LUMBAR: ICD-10-CM

## 2021-09-14 PROCEDURE — 3008F PR BODY MASS INDEX (BMI) DOCUMENTED: ICD-10-PCS | Mod: CPTII,S$GLB,, | Performed by: ORTHOPAEDIC SURGERY

## 2021-09-14 PROCEDURE — 72110 XR LUMBAR SPINE AP AND LAT WITH FLEX/EXT: ICD-10-PCS | Mod: 26,,, | Performed by: RADIOLOGY

## 2021-09-14 PROCEDURE — 99999 PR PBB SHADOW E&M-EST. PATIENT-LVL III: ICD-10-PCS | Mod: PBBFAC,,, | Performed by: ORTHOPAEDIC SURGERY

## 2021-09-14 PROCEDURE — 99999 PR PBB SHADOW E&M-EST. PATIENT-LVL III: CPT | Mod: PBBFAC,,, | Performed by: ORTHOPAEDIC SURGERY

## 2021-09-14 PROCEDURE — 3008F BODY MASS INDEX DOCD: CPT | Mod: CPTII,S$GLB,, | Performed by: ORTHOPAEDIC SURGERY

## 2021-09-14 PROCEDURE — 1159F MED LIST DOCD IN RCRD: CPT | Mod: CPTII,S$GLB,, | Performed by: ORTHOPAEDIC SURGERY

## 2021-09-14 PROCEDURE — 1159F PR MEDICATION LIST DOCUMENTED IN MEDICAL RECORD: ICD-10-PCS | Mod: CPTII,S$GLB,, | Performed by: ORTHOPAEDIC SURGERY

## 2021-09-14 PROCEDURE — 99204 OFFICE O/P NEW MOD 45 MIN: CPT | Mod: S$GLB,,, | Performed by: ORTHOPAEDIC SURGERY

## 2021-09-14 PROCEDURE — 99204 PR OFFICE/OUTPT VISIT, NEW, LEVL IV, 45-59 MIN: ICD-10-PCS | Mod: S$GLB,,, | Performed by: ORTHOPAEDIC SURGERY

## 2021-09-14 PROCEDURE — 72110 X-RAY EXAM L-2 SPINE 4/>VWS: CPT | Mod: 26,,, | Performed by: RADIOLOGY

## 2021-09-14 PROCEDURE — 72110 X-RAY EXAM L-2 SPINE 4/>VWS: CPT | Mod: TC

## 2021-09-14 RX ORDER — METHOCARBAMOL 750 MG/1
750 TABLET, FILM COATED ORAL NIGHTLY PRN
Qty: 30 TABLET | Refills: 0 | Status: SHIPPED | OUTPATIENT
Start: 2021-09-14 | End: 2021-09-24

## 2021-09-14 RX ORDER — MELOXICAM 7.5 MG/1
7.5 TABLET ORAL DAILY
Qty: 30 TABLET | Refills: 0 | Status: SHIPPED | OUTPATIENT
Start: 2021-09-14 | End: 2023-01-17 | Stop reason: ALTCHOICE

## 2021-11-06 DIAGNOSIS — F98.8 ATTENTION DEFICIT DISORDER, UNSPECIFIED HYPERACTIVITY PRESENCE: ICD-10-CM

## 2021-11-06 RX ORDER — LISDEXAMFETAMINE DIMESYLATE 30 MG/1
30 CAPSULE ORAL EVERY MORNING
Qty: 30 CAPSULE | Refills: 0 | Status: CANCELLED | OUTPATIENT
Start: 2021-11-06

## 2021-11-09 ENCOUNTER — PATIENT MESSAGE (OUTPATIENT)
Dept: INTERNAL MEDICINE | Facility: CLINIC | Age: 46
End: 2021-11-09
Payer: COMMERCIAL

## 2021-11-28 DIAGNOSIS — F98.8 ATTENTION DEFICIT DISORDER, UNSPECIFIED HYPERACTIVITY PRESENCE: ICD-10-CM

## 2021-11-29 RX ORDER — LISDEXAMFETAMINE DIMESYLATE 30 MG/1
30 CAPSULE ORAL EVERY MORNING
Qty: 30 CAPSULE | Refills: 0 | Status: SHIPPED | OUTPATIENT
Start: 2021-11-29 | End: 2021-12-03 | Stop reason: SDUPTHER

## 2021-12-03 ENCOUNTER — TELEPHONE (OUTPATIENT)
Dept: INTERNAL MEDICINE | Facility: CLINIC | Age: 46
End: 2021-12-03
Payer: COMMERCIAL

## 2021-12-03 ENCOUNTER — OFFICE VISIT (OUTPATIENT)
Dept: INTERNAL MEDICINE | Facility: CLINIC | Age: 46
End: 2021-12-03
Payer: COMMERCIAL

## 2021-12-03 DIAGNOSIS — F98.8 ATTENTION DEFICIT DISORDER, UNSPECIFIED HYPERACTIVITY PRESENCE: Primary | ICD-10-CM

## 2021-12-03 DIAGNOSIS — E03.4 HYPOTHYROIDISM DUE TO ACQUIRED ATROPHY OF THYROID: ICD-10-CM

## 2021-12-03 DIAGNOSIS — E78.00 HYPERCHOLESTEREMIA: ICD-10-CM

## 2021-12-03 PROCEDURE — 99214 PR OFFICE/OUTPT VISIT, EST, LEVL IV, 30-39 MIN: ICD-10-PCS | Mod: 95,,, | Performed by: INTERNAL MEDICINE

## 2021-12-03 PROCEDURE — 99214 OFFICE O/P EST MOD 30 MIN: CPT | Mod: 95,,, | Performed by: INTERNAL MEDICINE

## 2021-12-03 RX ORDER — LISDEXAMFETAMINE DIMESYLATE 30 MG/1
30 CAPSULE ORAL EVERY MORNING
Qty: 30 CAPSULE | Refills: 0 | Status: SHIPPED | OUTPATIENT
Start: 2022-01-03 | End: 2022-04-26 | Stop reason: SDUPTHER

## 2021-12-03 RX ORDER — LISDEXAMFETAMINE DIMESYLATE 30 MG/1
30 CAPSULE ORAL EVERY MORNING
Qty: 30 CAPSULE | Refills: 0 | Status: SHIPPED | OUTPATIENT
Start: 2021-12-03 | End: 2022-04-26 | Stop reason: SDUPTHER

## 2021-12-03 RX ORDER — LISDEXAMFETAMINE DIMESYLATE 30 MG/1
30 CAPSULE ORAL EVERY MORNING
Qty: 30 CAPSULE | Refills: 0 | Status: SHIPPED | OUTPATIENT
Start: 2022-02-03 | End: 2022-04-26 | Stop reason: SDUPTHER

## 2021-12-08 ENCOUNTER — OFFICE VISIT (OUTPATIENT)
Dept: URGENT CARE | Facility: CLINIC | Age: 46
End: 2021-12-08
Payer: COMMERCIAL

## 2021-12-08 VITALS
HEIGHT: 67 IN | SYSTOLIC BLOOD PRESSURE: 130 MMHG | BODY MASS INDEX: 25.9 KG/M2 | OXYGEN SATURATION: 99 % | RESPIRATION RATE: 17 BRPM | WEIGHT: 165 LBS | TEMPERATURE: 98 F | DIASTOLIC BLOOD PRESSURE: 94 MMHG | HEART RATE: 72 BPM

## 2021-12-08 DIAGNOSIS — Z20.822 ENCOUNTER FOR LABORATORY TESTING FOR COVID-19 VIRUS: ICD-10-CM

## 2021-12-08 DIAGNOSIS — J00 NASOPHARYNGITIS: Primary | ICD-10-CM

## 2021-12-08 LAB
CTP QC/QA: YES
SARS-COV-2 RDRP RESP QL NAA+PROBE: NEGATIVE

## 2021-12-08 PROCEDURE — 99213 OFFICE O/P EST LOW 20 MIN: CPT | Mod: S$GLB,,, | Performed by: STUDENT IN AN ORGANIZED HEALTH CARE EDUCATION/TRAINING PROGRAM

## 2021-12-08 PROCEDURE — 99213 PR OFFICE/OUTPT VISIT, EST, LEVL III, 20-29 MIN: ICD-10-PCS | Mod: S$GLB,,, | Performed by: STUDENT IN AN ORGANIZED HEALTH CARE EDUCATION/TRAINING PROGRAM

## 2021-12-08 PROCEDURE — U0002 COVID-19 LAB TEST NON-CDC: HCPCS | Mod: QW,S$GLB,, | Performed by: STUDENT IN AN ORGANIZED HEALTH CARE EDUCATION/TRAINING PROGRAM

## 2021-12-08 PROCEDURE — U0002: ICD-10-PCS | Mod: QW,S$GLB,, | Performed by: STUDENT IN AN ORGANIZED HEALTH CARE EDUCATION/TRAINING PROGRAM

## 2021-12-08 RX ORDER — PREDNISONE 20 MG/1
20 TABLET ORAL DAILY
Qty: 5 TABLET | Refills: 0 | Status: SHIPPED | OUTPATIENT
Start: 2021-12-08 | End: 2021-12-13

## 2021-12-08 RX ORDER — FLUTICASONE PROPIONATE 50 MCG
1 SPRAY, SUSPENSION (ML) NASAL 2 TIMES DAILY
Qty: 9.9 ML | Refills: 0 | Status: SHIPPED | OUTPATIENT
Start: 2021-12-08 | End: 2021-12-22

## 2021-12-08 RX ORDER — LEVOCETIRIZINE DIHYDROCHLORIDE 5 MG/1
5 TABLET, FILM COATED ORAL NIGHTLY
Qty: 14 TABLET | Refills: 0 | Status: SHIPPED | OUTPATIENT
Start: 2021-12-08 | End: 2021-12-22

## 2022-03-03 DIAGNOSIS — E03.4 HYPOTHYROIDISM DUE TO ACQUIRED ATROPHY OF THYROID: ICD-10-CM

## 2022-03-03 RX ORDER — LEVOTHYROXINE SODIUM 137 UG/1
TABLET ORAL
Qty: 30 TABLET | Refills: 0 | Status: SHIPPED | OUTPATIENT
Start: 2022-03-03 | End: 2022-05-12

## 2022-03-03 NOTE — TELEPHONE ENCOUNTER
This Rx Request does not qualify for assessment with the [facility].    Please review protocol details and the Care Due Message for extra clinical information    Reasons Rx Request may be deferred:  Labs/Vitals overdue  Labs/Vitals abnormal  Patient has been seen in the ED/Hospital since the last PCP visit  Medication is non-delegated/Outside of [facility] protocol  Provider is a non-participating provider    Annual with PCP overdue  [facility] appropriate indication for medication not met

## 2022-03-03 NOTE — TELEPHONE ENCOUNTER
No new care gaps identified.  Powered by [employer]. Reference number: [id number].   3/03/2022 3:42:17 AM CST

## 2022-04-26 ENCOUNTER — TELEPHONE (OUTPATIENT)
Dept: INTERNAL MEDICINE | Facility: CLINIC | Age: 47
End: 2022-04-26

## 2022-04-26 ENCOUNTER — LAB VISIT (OUTPATIENT)
Dept: LAB | Facility: HOSPITAL | Age: 47
End: 2022-04-26
Attending: INTERNAL MEDICINE
Payer: COMMERCIAL

## 2022-04-26 ENCOUNTER — OFFICE VISIT (OUTPATIENT)
Dept: INTERNAL MEDICINE | Facility: CLINIC | Age: 47
End: 2022-04-26
Payer: COMMERCIAL

## 2022-04-26 VITALS
SYSTOLIC BLOOD PRESSURE: 112 MMHG | OXYGEN SATURATION: 100 % | TEMPERATURE: 98 F | DIASTOLIC BLOOD PRESSURE: 78 MMHG | HEART RATE: 57 BPM | HEIGHT: 67 IN | BODY MASS INDEX: 25.4 KG/M2 | WEIGHT: 161.81 LBS

## 2022-04-26 DIAGNOSIS — F98.8 ATTENTION DEFICIT DISORDER, UNSPECIFIED HYPERACTIVITY PRESENCE: Primary | ICD-10-CM

## 2022-04-26 DIAGNOSIS — E66.3 OVERWEIGHT: ICD-10-CM

## 2022-04-26 DIAGNOSIS — E03.4 HYPOTHYROIDISM DUE TO ACQUIRED ATROPHY OF THYROID: ICD-10-CM

## 2022-04-26 DIAGNOSIS — E78.00 HYPERCHOLESTEREMIA: ICD-10-CM

## 2022-04-26 LAB
ANION GAP SERPL CALC-SCNC: 9 MMOL/L (ref 8–16)
BUN SERPL-MCNC: 8 MG/DL (ref 6–20)
CALCIUM SERPL-MCNC: 9.3 MG/DL (ref 8.7–10.5)
CHLORIDE SERPL-SCNC: 103 MMOL/L (ref 95–110)
CO2 SERPL-SCNC: 29 MMOL/L (ref 23–29)
CREAT SERPL-MCNC: 1.1 MG/DL (ref 0.5–1.4)
EST. GFR  (AFRICAN AMERICAN): >60 ML/MIN/1.73 M^2
EST. GFR  (NON AFRICAN AMERICAN): >60 ML/MIN/1.73 M^2
GLUCOSE SERPL-MCNC: 92 MG/DL (ref 70–110)
POTASSIUM SERPL-SCNC: 4.4 MMOL/L (ref 3.5–5.1)
SODIUM SERPL-SCNC: 141 MMOL/L (ref 136–145)
TSH SERPL DL<=0.005 MIU/L-ACNC: 1.42 UIU/ML (ref 0.4–4)

## 2022-04-26 PROCEDURE — 3078F PR MOST RECENT DIASTOLIC BLOOD PRESSURE < 80 MM HG: ICD-10-PCS | Mod: CPTII,S$GLB,, | Performed by: INTERNAL MEDICINE

## 2022-04-26 PROCEDURE — 3074F SYST BP LT 130 MM HG: CPT | Mod: CPTII,S$GLB,, | Performed by: INTERNAL MEDICINE

## 2022-04-26 PROCEDURE — 80048 BASIC METABOLIC PNL TOTAL CA: CPT | Performed by: INTERNAL MEDICINE

## 2022-04-26 PROCEDURE — 99999 PR PBB SHADOW E&M-EST. PATIENT-LVL III: ICD-10-PCS | Mod: PBBFAC,,, | Performed by: INTERNAL MEDICINE

## 2022-04-26 PROCEDURE — 3008F BODY MASS INDEX DOCD: CPT | Mod: CPTII,S$GLB,, | Performed by: INTERNAL MEDICINE

## 2022-04-26 PROCEDURE — 99214 OFFICE O/P EST MOD 30 MIN: CPT | Mod: S$GLB,,, | Performed by: INTERNAL MEDICINE

## 2022-04-26 PROCEDURE — 99214 PR OFFICE/OUTPT VISIT, EST, LEVL IV, 30-39 MIN: ICD-10-PCS | Mod: S$GLB,,, | Performed by: INTERNAL MEDICINE

## 2022-04-26 PROCEDURE — 3008F PR BODY MASS INDEX (BMI) DOCUMENTED: ICD-10-PCS | Mod: CPTII,S$GLB,, | Performed by: INTERNAL MEDICINE

## 2022-04-26 PROCEDURE — 3074F PR MOST RECENT SYSTOLIC BLOOD PRESSURE < 130 MM HG: ICD-10-PCS | Mod: CPTII,S$GLB,, | Performed by: INTERNAL MEDICINE

## 2022-04-26 PROCEDURE — 99999 PR PBB SHADOW E&M-EST. PATIENT-LVL III: CPT | Mod: PBBFAC,,, | Performed by: INTERNAL MEDICINE

## 2022-04-26 PROCEDURE — 84443 ASSAY THYROID STIM HORMONE: CPT | Performed by: INTERNAL MEDICINE

## 2022-04-26 PROCEDURE — 3078F DIAST BP <80 MM HG: CPT | Mod: CPTII,S$GLB,, | Performed by: INTERNAL MEDICINE

## 2022-04-26 PROCEDURE — 36415 COLL VENOUS BLD VENIPUNCTURE: CPT | Mod: PO | Performed by: INTERNAL MEDICINE

## 2022-04-26 RX ORDER — LISDEXAMFETAMINE DIMESYLATE 30 MG/1
30 CAPSULE ORAL EVERY MORNING
Qty: 30 CAPSULE | Refills: 0 | Status: SHIPPED | OUTPATIENT
Start: 2022-05-26 | End: 2022-09-20 | Stop reason: SDUPTHER

## 2022-04-26 RX ORDER — LISDEXAMFETAMINE DIMESYLATE 30 MG/1
30 CAPSULE ORAL EVERY MORNING
Qty: 30 CAPSULE | Refills: 0 | Status: SHIPPED | OUTPATIENT
Start: 2022-04-26 | End: 2022-05-12 | Stop reason: SDUPTHER

## 2022-04-26 RX ORDER — LISDEXAMFETAMINE DIMESYLATE 30 MG/1
30 CAPSULE ORAL EVERY MORNING
Qty: 30 CAPSULE | Refills: 0 | Status: SHIPPED | OUTPATIENT
Start: 2022-06-26 | End: 2022-09-20 | Stop reason: SDUPTHER

## 2022-04-26 NOTE — PROGRESS NOTES
Subjective:       Patient ID: Srinivas Snyder Jr. is a 46 y.o. male.    Chief Complaint: Follow-up and Medication Refill    HPI   Pt with ADD, Hypothyroidism, HLD is here for f/u. Requesting refills of his Vyvanse which has been controlling his symptoms. No med SE's.  Review of Systems   Constitutional: Negative for activity change, appetite change, chills, diaphoresis, fatigue, fever and unexpected weight change.   HENT: Negative for hearing loss, postnasal drip, rhinorrhea, sinus pressure/congestion, sneezing, sore throat, trouble swallowing and voice change.    Eyes: Negative for discharge and visual disturbance.   Respiratory: Negative for cough, chest tightness, shortness of breath and wheezing.    Cardiovascular: Negative for chest pain, palpitations and leg swelling.   Gastrointestinal: Negative for abdominal pain, blood in stool, constipation, diarrhea, nausea and vomiting.   Endocrine: Negative for polydipsia and polyuria.   Genitourinary: Negative for difficulty urinating, dysuria, hematuria and urgency.   Musculoskeletal: Negative for arthralgias, joint swelling, myalgias and neck pain.   Integumentary:  Negative for rash and wound.   Allergic/Immunologic: Negative for environmental allergies and food allergies.   Neurological: Negative for weakness and headaches.   Hematological: Negative for adenopathy. Does not bruise/bleed easily.   Psychiatric/Behavioral: Positive for decreased concentration. Negative for confusion, dysphoric mood, self-injury and suicidal ideas.         Objective:      Physical Exam  Constitutional:       General: He is not in acute distress.     Appearance: He is well-developed. He is not diaphoretic.   HENT:      Head: Normocephalic and atraumatic.      Right Ear: External ear normal.      Left Ear: External ear normal.      Nose: Nose normal.      Mouth/Throat:      Pharynx: No oropharyngeal exudate.   Eyes:      General: No scleral icterus.        Right eye: No discharge.          Left eye: No discharge.      Conjunctiva/sclera: Conjunctivae normal.      Pupils: Pupils are equal, round, and reactive to light.   Neck:      Vascular: No JVD.   Cardiovascular:      Rate and Rhythm: Normal rate and regular rhythm.      Heart sounds: Normal heart sounds. No murmur heard.  Pulmonary:      Effort: Pulmonary effort is normal. No respiratory distress.      Breath sounds: Normal breath sounds. No wheezing or rales.   Abdominal:      General: Bowel sounds are normal.      Palpations: Abdomen is soft.      Tenderness: There is no abdominal tenderness.   Musculoskeletal:      Cervical back: Normal range of motion and neck supple.   Lymphadenopathy:      Cervical: No cervical adenopathy.   Skin:     General: Skin is warm and dry.      Coloration: Skin is not pale.      Findings: No rash.   Neurological:      Mental Status: He is alert and oriented to person, place, and time.         Assessment:       Problem List Items Addressed This Visit        Psychiatric    Attention deficit disorder - Primary    Relevant Medications    lisdexamfetamine (VYVANSE) 30 MG capsule (Start on 6/26/2022)    lisdexamfetamine (VYVANSE) 30 MG capsule (Start on 5/26/2022)    lisdexamfetamine (VYVANSE) 30 MG capsule       Cardiac/Vascular    Hypercholesteremia       Endocrine    Hypothyroidism      Other Visit Diagnoses     Overweight        Relevant Orders    Ambulatory referral/consult to Nutrition Services          Plan:    ADD- stable, refill Vyvanse 30 mg qam      Hypothyroidism- currently on Synthroid to 137 mcg daily       -repeat TSH/FT4       HLD- controlled on Lipitor

## 2022-04-26 NOTE — TELEPHONE ENCOUNTER
----- Message from Tabatha Magana sent at 4/26/2022 10:51 AM CDT -----  Contact: pt- [phone]  Patient would like to get a referral.    Referral to what specialty:  nutrition     Does the patient want the referral with a specific physician:  n/a    [facility] the [facility]    Reason (be specific):  weight loss    Does the patient already have the specialty clinic appointment scheduled:  no    If yes, what date is the appointment scheduled:   n/a    Is the insurance listed in Epic correct? (this is important for a referral):  yes    Advised patient that once provider approves this either a nurse or  will return their call?: yes    Would the patient like a call back, or a response through their [facility] portal?:   yes    Comments:

## 2022-04-27 ENCOUNTER — PATIENT MESSAGE (OUTPATIENT)
Dept: INTERNAL MEDICINE | Facility: CLINIC | Age: 47
End: 2022-04-27
Payer: COMMERCIAL

## 2022-04-27 DIAGNOSIS — Z00.00 ANNUAL PHYSICAL EXAM: ICD-10-CM

## 2022-04-27 DIAGNOSIS — E78.00 HYPERCHOLESTEREMIA: Primary | ICD-10-CM

## 2022-05-10 ENCOUNTER — IMMUNIZATION (OUTPATIENT)
Dept: PRIMARY CARE CLINIC | Facility: CLINIC | Age: 47
End: 2022-05-10
Payer: COMMERCIAL

## 2022-05-10 DIAGNOSIS — Z23 NEED FOR VACCINATION: Primary | ICD-10-CM

## 2022-05-10 PROCEDURE — 91300 COVID-19, MRNA, LNP-S, PF, 30 MCG/0.3 ML DOSE VACCINE: CPT | Mod: PBBFAC | Performed by: INTERNAL MEDICINE

## 2022-05-12 ENCOUNTER — PATIENT MESSAGE (OUTPATIENT)
Dept: INTERNAL MEDICINE | Facility: CLINIC | Age: 47
End: 2022-05-12
Payer: COMMERCIAL

## 2022-05-12 DIAGNOSIS — E78.00 HYPERCHOLESTEREMIA: ICD-10-CM

## 2022-05-12 DIAGNOSIS — F98.8 ATTENTION DEFICIT DISORDER, UNSPECIFIED HYPERACTIVITY PRESENCE: ICD-10-CM

## 2022-05-12 DIAGNOSIS — E03.4 HYPOTHYROIDISM DUE TO ACQUIRED ATROPHY OF THYROID: ICD-10-CM

## 2022-05-12 RX ORDER — LISDEXAMFETAMINE DIMESYLATE 30 MG/1
30 CAPSULE ORAL EVERY MORNING
Qty: 30 CAPSULE | Refills: 0 | Status: SHIPPED | OUTPATIENT
Start: 2022-05-12 | End: 2022-09-20 | Stop reason: SDUPTHER

## 2022-05-12 RX ORDER — ATORVASTATIN CALCIUM 20 MG/1
20 TABLET, FILM COATED ORAL DAILY
Qty: 90 TABLET | Refills: 0 | Status: SHIPPED | OUTPATIENT
Start: 2022-05-12 | End: 2022-08-09

## 2022-05-12 RX ORDER — LEVOTHYROXINE SODIUM 137 UG/1
137 TABLET ORAL
Qty: 90 TABLET | Refills: 3 | Status: SHIPPED | OUTPATIENT
Start: 2022-05-12 | End: 2023-07-14

## 2022-05-12 NOTE — TELEPHONE ENCOUNTER
Care Due:                  Date            Visit Type   Department     Provider  --------------------------------------------------------------------------------                                MYCHART                              FOLLOWUP/OF  [facility] INTERNAL  Last Visit: 04-      FICE VISIT   MEDICINE       Amor Reeder                              EP -                              PRIMARY      [facility] INTERNAL  Next Visit: 07-      CARE ([facility])   MEDICINE       Amor Reeder                                                            Last  Test          Frequency    Reason                     Performed    Due Date  --------------------------------------------------------------------------------    CMP.........  12 months..  atorvastatin.............  07- 07-    Lipid Panel.  12 months..  atorvastatin.............  07- 07-    [employer] Embedded Care Gaps. Reference number: [id number]. 5/12/2022   3:45:19 AM CDT

## 2022-05-12 NOTE — TELEPHONE ENCOUNTER
No new care gaps identified.  [facility] Embedded Care Gaps. Reference number: [id number]. 5/12/2022   10:16:10 AM CDT

## 2022-05-12 NOTE — TELEPHONE ENCOUNTER
Refill Authorization Note   Srinivas Snyder  is requesting a refill authorization.  Brief Assessment and Rationale for Refill:  Approve     Medication Therapy Plan:  FLOS    Medication Reconciliation Completed: No   Comments:     No Care Gaps recommended.     Note composed:10:34 AM 05/12/2022

## 2022-07-24 ENCOUNTER — OFFICE VISIT (OUTPATIENT)
Dept: URGENT CARE | Facility: CLINIC | Age: 47
End: 2022-07-24
Payer: COMMERCIAL

## 2022-07-24 VITALS
DIASTOLIC BLOOD PRESSURE: 88 MMHG | WEIGHT: 165 LBS | HEIGHT: 67 IN | OXYGEN SATURATION: 98 % | TEMPERATURE: 98 F | HEART RATE: 61 BPM | SYSTOLIC BLOOD PRESSURE: 124 MMHG | RESPIRATION RATE: 17 BRPM | BODY MASS INDEX: 25.9 KG/M2

## 2022-07-24 DIAGNOSIS — H00.014 HORDEOLUM EXTERNUM OF LEFT UPPER EYELID: Primary | ICD-10-CM

## 2022-07-24 PROCEDURE — 3008F PR BODY MASS INDEX (BMI) DOCUMENTED: ICD-10-PCS | Mod: CPTII,S$GLB,, | Performed by: FAMILY MEDICINE

## 2022-07-24 PROCEDURE — 1159F MED LIST DOCD IN RCRD: CPT | Mod: CPTII,S$GLB,, | Performed by: FAMILY MEDICINE

## 2022-07-24 PROCEDURE — 1159F PR MEDICATION LIST DOCUMENTED IN MEDICAL RECORD: ICD-10-PCS | Mod: CPTII,S$GLB,, | Performed by: FAMILY MEDICINE

## 2022-07-24 PROCEDURE — 1160F PR REVIEW ALL MEDS BY PRESCRIBER/CLIN PHARMACIST DOCUMENTED: ICD-10-PCS | Mod: CPTII,S$GLB,, | Performed by: FAMILY MEDICINE

## 2022-07-24 PROCEDURE — 3008F BODY MASS INDEX DOCD: CPT | Mod: CPTII,S$GLB,, | Performed by: FAMILY MEDICINE

## 2022-07-24 PROCEDURE — 3074F PR MOST RECENT SYSTOLIC BLOOD PRESSURE < 130 MM HG: ICD-10-PCS | Mod: CPTII,S$GLB,, | Performed by: FAMILY MEDICINE

## 2022-07-24 PROCEDURE — 99213 PR OFFICE/OUTPT VISIT, EST, LEVL III, 20-29 MIN: ICD-10-PCS | Mod: S$GLB,,, | Performed by: FAMILY MEDICINE

## 2022-07-24 PROCEDURE — 3079F DIAST BP 80-89 MM HG: CPT | Mod: CPTII,S$GLB,, | Performed by: FAMILY MEDICINE

## 2022-07-24 PROCEDURE — 3079F PR MOST RECENT DIASTOLIC BLOOD PRESSURE 80-89 MM HG: ICD-10-PCS | Mod: CPTII,S$GLB,, | Performed by: FAMILY MEDICINE

## 2022-07-24 PROCEDURE — 99213 OFFICE O/P EST LOW 20 MIN: CPT | Mod: S$GLB,,, | Performed by: FAMILY MEDICINE

## 2022-07-24 PROCEDURE — 3074F SYST BP LT 130 MM HG: CPT | Mod: CPTII,S$GLB,, | Performed by: FAMILY MEDICINE

## 2022-07-24 PROCEDURE — 1160F RVW MEDS BY RX/DR IN RCRD: CPT | Mod: CPTII,S$GLB,, | Performed by: FAMILY MEDICINE

## 2022-07-24 RX ORDER — CEPHALEXIN 500 MG/1
500 CAPSULE ORAL EVERY 12 HOURS
Qty: 14 CAPSULE | Refills: 0 | Status: SHIPPED | OUTPATIENT
Start: 2022-07-24 | End: 2022-07-31

## 2022-07-24 RX ORDER — ERYTHROMYCIN 5 MG/G
OINTMENT OPHTHALMIC 3 TIMES DAILY
Qty: 3.5 G | Refills: 0 | Status: SHIPPED | OUTPATIENT
Start: 2022-07-24 | End: 2023-01-17 | Stop reason: ALTCHOICE

## 2022-07-24 NOTE — PROGRESS NOTES
"Subjective:       Patient ID: Srinivas Snyder Jr. is a 46 y.o. male.    Vitals:  height is 5' 7" (1.702 m) and weight is 74.8 kg (165 lb). His temperature is 98.4 °F (36.9 °C). His blood pressure is 124/88 and his pulse is 61. His respiration is 17 and oxygen saturation is 98%.     Chief Complaint: Eye Problem    This is a 46 y.o. male who presents today with a chief complaint of left eye problem x yesterday. Pt states that he noticed a redness, swelling  in the corner of his left eye. No pain in pt left eye and treated with ice. Denies pink eye, HA, blurry vision, discharge.       Eye Problem   The left eye is affected. This is a new problem. The current episode started yesterday. The problem has been gradually improving. The injury mechanism is unknown. The pain is at a severity of 0/10. The patient is experiencing no pain. There is no known exposure to pink eye. He does not wear contacts. Pertinent negatives include no blurred vision, eye discharge, double vision, foreign body sensation or itching. Treatments tried: ice. The treatment provided mild relief.       Eyes: Negative for eye discharge, eye itching, double vision and blurred vision.       Objective:      Physical Exam   Constitutional: He is oriented to person, place, and time. He appears well-developed. He is cooperative.  Non-toxic appearance. He does not appear ill. No distress.   HENT:   Head: Normocephalic and atraumatic.   Ears:   Right Ear: Hearing, tympanic membrane, external ear and ear canal normal. impacted cerumen  Left Ear: Hearing, tympanic membrane, external ear and ear canal normal. impacted cerumen  Nose: Nose normal. No mucosal edema, rhinorrhea, nasal deformity or congestion. No epistaxis. Right sinus exhibits no maxillary sinus tenderness and no frontal sinus tenderness. Left sinus exhibits no maxillary sinus tenderness and no frontal sinus tenderness.   Mouth/Throat: Uvula is midline, oropharynx is clear and moist and mucous " membranes are normal. No trismus in the jaw. Normal dentition. No uvula swelling.   Eyes: Conjunctivae and lids are normal. Pupils are equal, round, and reactive to light. Right eye exhibits no discharge. Left eye exhibits no discharge. No scleral icterus. Extraocular movement intact      Comments:  Left Eye:  +ve about 0.7 cm area of redness and swelling over medial karina of eye upper aspect.   no conjunctival erythema.   No discharge.  Visual acuity at baseline.       Neck: Trachea normal and phonation normal. Neck supple.   Cardiovascular: Normal rate, regular rhythm, normal heart sounds and normal pulses.   No murmur heard.  Pulmonary/Chest: Effort normal and breath sounds normal. No stridor. No respiratory distress. He has no wheezes. He has no rhonchi. He has no rales.   Abdominal: Normal appearance and bowel sounds are normal. He exhibits no distension and no mass. Soft. There is no abdominal tenderness.   Musculoskeletal: Normal range of motion.         General: No deformity. Normal range of motion.   Neurological: He is alert and oriented to person, place, and time. He exhibits normal muscle tone. Coordination normal.   Skin: Skin is warm, dry, intact, not diaphoretic and not pale.   Psychiatric: His speech is normal and behavior is normal. Judgment and thought content normal.   Nursing note and vitals reviewed.        Assessment:       1. Hordeolum externum of left upper eyelid          Plan:         Hordeolum externum of left upper eyelid    Other orders  -     cephALEXin (KEFLEX) 500 MG capsule; Take 1 capsule (500 mg total) by mouth every 12 (twelve) hours. for 7 days  Dispense: 14 capsule; Refill: 0  -     erythromycin (ROMYCIN) ophthalmic ointment; Place into the left eye 3 (three) times daily.  Dispense: 3.5 g; Refill: 0

## 2022-07-26 ENCOUNTER — TELEPHONE (OUTPATIENT)
Dept: OPHTHALMOLOGY | Facility: CLINIC | Age: 47
End: 2022-07-26
Payer: COMMERCIAL

## 2022-07-26 ENCOUNTER — OFFICE VISIT (OUTPATIENT)
Dept: OPTOMETRY | Facility: CLINIC | Age: 47
End: 2022-07-26
Payer: COMMERCIAL

## 2022-07-26 DIAGNOSIS — H01.003 BLEPHARITIS OF BOTH EYES, UNSPECIFIED EYELID, UNSPECIFIED TYPE: ICD-10-CM

## 2022-07-26 DIAGNOSIS — H01.006 BLEPHARITIS OF BOTH EYES, UNSPECIFIED EYELID, UNSPECIFIED TYPE: ICD-10-CM

## 2022-07-26 DIAGNOSIS — J30.9 ALLERGIC SHINERS: Primary | ICD-10-CM

## 2022-07-26 PROCEDURE — 99999 PR PBB SHADOW E&M-EST. PATIENT-LVL II: ICD-10-PCS | Mod: PBBFAC,,, | Performed by: OPTOMETRIST

## 2022-07-26 PROCEDURE — 92004 PR EYE EXAM, NEW PATIENT,COMPREHESV: ICD-10-PCS | Mod: S$GLB,,, | Performed by: OPTOMETRIST

## 2022-07-26 PROCEDURE — 92004 COMPRE OPH EXAM NEW PT 1/>: CPT | Mod: S$GLB,,, | Performed by: OPTOMETRIST

## 2022-07-26 PROCEDURE — 99999 PR PBB SHADOW E&M-EST. PATIENT-LVL II: CPT | Mod: PBBFAC,,, | Performed by: OPTOMETRIST

## 2022-07-26 PROCEDURE — 1159F MED LIST DOCD IN RCRD: CPT | Mod: CPTII,S$GLB,, | Performed by: OPTOMETRIST

## 2022-07-26 PROCEDURE — 1159F PR MEDICATION LIST DOCUMENTED IN MEDICAL RECORD: ICD-10-PCS | Mod: CPTII,S$GLB,, | Performed by: OPTOMETRIST

## 2022-07-26 NOTE — TELEPHONE ENCOUNTER
----- Message from Elizabeth Joel sent at 7/26/2022  8:10 AM CDT -----  Contact: Patient    ----- Message -----  From: Radha Briones  Sent: 7/25/2022   6:11 PM CDT  To: [facility] Clinical Support Staff    Type:   Appointment Request    Name of Caller:Patient  Symptoms:left eye turn black /Urgent care follow up   Would the patient rather a call back or a response via [facility]? Call back   Best Call Back Number:[phone]  Additional Information: Please assist

## 2022-07-26 NOTE — PROGRESS NOTES
"HPI     Eye Problem      Additional comments: Patient Srinivas Umana "Dong" LillianJr. Is a   46 year old male.              Comments     Pt here for urgent care visit. Pt went to [facility] 2 days ago   for "blackness" around eye OS for the past 2 days. Pt denies any eye pain,   watering, discharge, light sensitivity, or blurry [facility] OU. Pt was diagnosed   with hordeolum OS and was prescribed Keflex and Erythromycin tiki.    Eyemeds:   1. Keflex 500 mg po BID  2. Erythroumycin tiki, pt not using    POHx: (+)s/p LASIK OU          Last edited by Diana Esparza on 7/26/2022  9:30 AM. (History)            Assessment /Plan     For exam results, see Encounter Report.    Allergic shiners    Blepharitis of both eyes, unspecified eyelid, unspecified type      Continue with Keflex, start pataday QAM, use ocusoft lid scrubs daily    Return if condition worsens or does not improve               "

## 2022-07-26 NOTE — TELEPHONE ENCOUNTER
----- Message from Heidi Yeboah MA sent at 7/26/2022  8:15 AM CDT -----  Contact: Patient    ----- Message -----  From: Radha Briones  Sent: 7/25/2022   6:10 PM CDT  To: [facility] Clinical Support    Type:   Appointment Request    Name of Caller:Patient  Symptoms:left eye turn black /Urgent care follow up   Would the patient rather a call back or a response via [facility]? Call back   Best Call Back Number:[phone]  Additional Information: Please assist

## 2022-07-26 NOTE — TELEPHONE ENCOUNTER
----- Message from Celeste Holland MA sent at 7/26/2022  7:36 AM CDT -----  Contact: Patient    ----- Message -----  From: Radha Briones  Sent: 7/25/2022   6:10 PM CDT  To: [facility] Clinical Support Staff    Type:   Appointment Request    Name of Caller:Patient  Symptoms:left eye turn black /Urgent care follow up   Would the patient rather a call back or a response via [facility]? Call back   Best Call Back Number:[phone]  Additional Information: Please assist

## 2022-08-08 DIAGNOSIS — E78.00 HYPERCHOLESTEREMIA: ICD-10-CM

## 2022-08-08 NOTE — TELEPHONE ENCOUNTER
Refill Routing Note   Medication(s) are not appropriate for processing by [facility] for the following reason(s):      - Required laboratory values are outdated    [facility] action(s):  Defer          Medication reconciliation completed: No     Appointments  past 12m or future 3m with PCP    Date Provider   Last Visit   4/26/2022 Amor Reeder,    Next Visit   9/20/2022 Amor Reeder,    ED visits in past 90 days: 0        Note composed:5:48 PM 08/08/2022

## 2022-08-08 NOTE — TELEPHONE ENCOUNTER
Care Due:                  Date            Visit Type   Department     Provider  --------------------------------------------------------------------------------                                MYCHART                              FOLLOWUP/OF  [facility] INTERNAL  Last Visit: 04-      FICE VISIT   [facility]       Amor Reeder                              MYCHART                              FOLLOWUP/OF  [facility] INTERNAL  Next Visit: 09-      [facility] VISIT   [facility]                                                            Last  Test          Frequency    Reason                     Performed    Due Date  --------------------------------------------------------------------------------    CMP.........  12 months..  atorvastatin.............  07- 07-    Lipid Panel.  12 months..  atorvastatin.............  07- 07-    [employer] Embedded Care Gaps. Reference number: [id number]. 8/08/2022   3:41:49 AM CDT

## 2022-08-09 RX ORDER — ATORVASTATIN CALCIUM 20 MG/1
TABLET, FILM COATED ORAL
Qty: 90 TABLET | Refills: 3 | Status: SHIPPED | OUTPATIENT
Start: 2022-08-09 | End: 2023-07-17 | Stop reason: SDUPTHER

## 2022-09-20 ENCOUNTER — OFFICE VISIT (OUTPATIENT)
Dept: INTERNAL MEDICINE | Facility: CLINIC | Age: 47
End: 2022-09-20
Payer: COMMERCIAL

## 2022-09-20 VITALS
SYSTOLIC BLOOD PRESSURE: 110 MMHG | BODY MASS INDEX: 24.31 KG/M2 | DIASTOLIC BLOOD PRESSURE: 82 MMHG | HEIGHT: 67 IN | TEMPERATURE: 98 F | HEART RATE: 59 BPM | RESPIRATION RATE: 18 BRPM | OXYGEN SATURATION: 99 % | WEIGHT: 154.88 LBS

## 2022-09-20 DIAGNOSIS — E03.4 HYPOTHYROIDISM DUE TO ACQUIRED ATROPHY OF THYROID: ICD-10-CM

## 2022-09-20 DIAGNOSIS — F98.8 ATTENTION DEFICIT DISORDER, UNSPECIFIED HYPERACTIVITY PRESENCE: Primary | ICD-10-CM

## 2022-09-20 DIAGNOSIS — E78.00 HYPERCHOLESTEREMIA: ICD-10-CM

## 2022-09-20 PROCEDURE — 3079F PR MOST RECENT DIASTOLIC BLOOD PRESSURE 80-89 MM HG: ICD-10-PCS | Mod: CPTII,S$GLB,, | Performed by: INTERNAL MEDICINE

## 2022-09-20 PROCEDURE — 99214 PR OFFICE/OUTPT VISIT, EST, LEVL IV, 30-39 MIN: ICD-10-PCS | Mod: S$GLB,,, | Performed by: INTERNAL MEDICINE

## 2022-09-20 PROCEDURE — 3074F PR MOST RECENT SYSTOLIC BLOOD PRESSURE < 130 MM HG: ICD-10-PCS | Mod: CPTII,S$GLB,, | Performed by: INTERNAL MEDICINE

## 2022-09-20 PROCEDURE — 3079F DIAST BP 80-89 MM HG: CPT | Mod: CPTII,S$GLB,, | Performed by: INTERNAL MEDICINE

## 2022-09-20 PROCEDURE — 1159F PR MEDICATION LIST DOCUMENTED IN MEDICAL RECORD: ICD-10-PCS | Mod: CPTII,S$GLB,, | Performed by: INTERNAL MEDICINE

## 2022-09-20 PROCEDURE — 3008F PR BODY MASS INDEX (BMI) DOCUMENTED: ICD-10-PCS | Mod: CPTII,S$GLB,, | Performed by: INTERNAL MEDICINE

## 2022-09-20 PROCEDURE — 99214 OFFICE O/P EST MOD 30 MIN: CPT | Mod: S$GLB,,, | Performed by: INTERNAL MEDICINE

## 2022-09-20 PROCEDURE — 99999 PR PBB SHADOW E&M-EST. PATIENT-LVL III: ICD-10-PCS | Mod: PBBFAC,,, | Performed by: INTERNAL MEDICINE

## 2022-09-20 PROCEDURE — 3008F BODY MASS INDEX DOCD: CPT | Mod: CPTII,S$GLB,, | Performed by: INTERNAL MEDICINE

## 2022-09-20 PROCEDURE — 1159F MED LIST DOCD IN RCRD: CPT | Mod: CPTII,S$GLB,, | Performed by: INTERNAL MEDICINE

## 2022-09-20 PROCEDURE — 3074F SYST BP LT 130 MM HG: CPT | Mod: CPTII,S$GLB,, | Performed by: INTERNAL MEDICINE

## 2022-09-20 PROCEDURE — 99999 PR PBB SHADOW E&M-EST. PATIENT-LVL III: CPT | Mod: PBBFAC,,, | Performed by: INTERNAL MEDICINE

## 2022-09-20 RX ORDER — LISDEXAMFETAMINE DIMESYLATE 30 MG/1
30 CAPSULE ORAL EVERY MORNING
Qty: 30 CAPSULE | Refills: 0 | Status: SHIPPED | OUTPATIENT
Start: 2022-09-20 | End: 2023-01-17 | Stop reason: SDUPTHER

## 2022-09-20 RX ORDER — LISDEXAMFETAMINE DIMESYLATE 30 MG/1
30 CAPSULE ORAL EVERY MORNING
Qty: 30 CAPSULE | Refills: 0 | Status: SHIPPED | OUTPATIENT
Start: 2022-10-20 | End: 2023-01-17 | Stop reason: SDUPTHER

## 2022-09-20 RX ORDER — LISDEXAMFETAMINE DIMESYLATE 30 MG/1
30 CAPSULE ORAL EVERY MORNING
Qty: 30 CAPSULE | Refills: 0 | Status: SHIPPED | OUTPATIENT
Start: 2022-11-20 | End: 2023-01-17 | Stop reason: SDUPTHER

## 2022-09-20 NOTE — PROGRESS NOTES
Subjective:       Patient ID: Srinivas Snyder Jr. is a 46 y.o. male.    Chief Complaint: Medication Refill (Vyvanse ) and Red bumps (Pt says has little red bump on back just wants to discuss.)    HPI  Pt with ADD, Hypothyroidism, HLD is here for f/u. Requesting refills of his Vyvanse which has been controlling his symptoms. No med SE's.  Review of Systems   Constitutional:  Negative for activity change, appetite change, chills, diaphoresis, fatigue, fever and unexpected weight change.   HENT:  Negative for hearing loss, postnasal drip, rhinorrhea, sinus pressure/congestion, sneezing, sore throat, trouble swallowing and voice change.    Eyes:  Negative for discharge and visual disturbance.   Respiratory:  Negative for cough, chest tightness, shortness of breath and wheezing.    Cardiovascular:  Negative for chest pain, palpitations and leg swelling.   Gastrointestinal:  Negative for abdominal pain, blood in stool, constipation, diarrhea, nausea and vomiting.   Endocrine: Negative for polydipsia and polyuria.   Genitourinary:  Negative for difficulty urinating, dysuria, hematuria and urgency.   Musculoskeletal:  Negative for arthralgias, joint swelling, myalgias and neck pain.   Integumentary:  Negative for rash and wound.   Allergic/Immunologic: Negative for environmental allergies and food allergies.   Neurological:  Negative for weakness and headaches.   Hematological:  Negative for adenopathy. Does not bruise/bleed easily.   Psychiatric/Behavioral:  Positive for decreased concentration. Negative for confusion, dysphoric mood, self-injury and suicidal ideas.        Objective:      Physical Exam  Constitutional:       General: He is not in acute distress.     Appearance: Normal appearance. He is well-developed. He is not diaphoretic.   HENT:      Head: Normocephalic and atraumatic.      Right Ear: External ear normal.      Left Ear: External ear normal.      Nose: Nose normal.      Mouth/Throat:      Pharynx:  No oropharyngeal exudate.   Eyes:      General: No scleral icterus.        Right eye: No discharge.         Left eye: No discharge.      Conjunctiva/sclera: Conjunctivae normal.      Pupils: Pupils are equal, round, and reactive to light.   Neck:      Vascular: No JVD.   Cardiovascular:      Rate and Rhythm: Normal rate and regular rhythm.      Pulses: Normal pulses.      Heart sounds: Normal heart sounds. No murmur heard.  Pulmonary:      Effort: Pulmonary effort is normal. No respiratory distress.      Breath sounds: Normal breath sounds. No wheezing or rales.   Abdominal:      General: Bowel sounds are normal.      Tenderness: There is no abdominal tenderness. There is no guarding or rebound.   Musculoskeletal:      Cervical back: Normal range of motion and neck supple.      Right lower leg: No edema.      Left lower leg: No edema.   Lymphadenopathy:      Cervical: No cervical adenopathy.   Skin:     General: Skin is warm and dry.      Capillary Refill: Capillary refill takes less than 2 seconds.      Coloration: Skin is not pale.      Findings: No rash.   Neurological:      Mental Status: He is alert and oriented to person, place, and time. Mental status is at baseline.      Cranial Nerves: No cranial nerve deficit.   Psychiatric:         Mood and Affect: Mood normal.         Behavior: Behavior normal.       Assessment:       Problem List Items Addressed This Visit          Psychiatric    Attention deficit disorder - Primary    Relevant Medications    lisdexamfetamine (VYVANSE) 30 MG capsule (Start on 11/20/2022)    lisdexamfetamine (VYVANSE) 30 MG capsule (Start on 10/20/2022)    lisdexamfetamine (VYVANSE) 30 MG capsule       Cardiac/Vascular    Hypercholesteremia       Endocrine    Hypothyroidism       Plan:    ADD- stable, refill Vyvanse 30 mg qam      Hypothyroidism- stable on Synthroid 137 mcg daily      HLD- controlled on Lipitor

## 2022-11-21 ENCOUNTER — PATIENT MESSAGE (OUTPATIENT)
Dept: ADMINISTRATIVE | Facility: HOSPITAL | Age: 47
End: 2022-11-21
Payer: COMMERCIAL

## 2022-12-19 ENCOUNTER — TELEPHONE (OUTPATIENT)
Dept: INTERNAL MEDICINE | Facility: CLINIC | Age: 47
End: 2022-12-19
Payer: COMMERCIAL

## 2022-12-19 NOTE — TELEPHONE ENCOUNTER
----- Message from Stephanie Cleveland sent at 12/19/2022  9:32 AM CST -----  Good Morning,  Patient: NU ERNST JR. [[id number]] is requesting a call from the Nurse please. (Pt has been Booked-Out twice and needs a medication refill please). Thank you in advance.

## 2023-01-17 ENCOUNTER — OFFICE VISIT (OUTPATIENT)
Dept: INTERNAL MEDICINE | Facility: CLINIC | Age: 48
End: 2023-01-17
Payer: COMMERCIAL

## 2023-01-17 VITALS
SYSTOLIC BLOOD PRESSURE: 112 MMHG | OXYGEN SATURATION: 99 % | HEIGHT: 67 IN | BODY MASS INDEX: 23.72 KG/M2 | TEMPERATURE: 98 F | RESPIRATION RATE: 16 BRPM | HEART RATE: 78 BPM | WEIGHT: 151.13 LBS | DIASTOLIC BLOOD PRESSURE: 86 MMHG

## 2023-01-17 DIAGNOSIS — Z00.00 ANNUAL PHYSICAL EXAM: Primary | ICD-10-CM

## 2023-01-17 DIAGNOSIS — F98.8 ATTENTION DEFICIT DISORDER, UNSPECIFIED HYPERACTIVITY PRESENCE: ICD-10-CM

## 2023-01-17 DIAGNOSIS — E78.00 HYPERCHOLESTEREMIA: ICD-10-CM

## 2023-01-17 DIAGNOSIS — E03.4 HYPOTHYROIDISM DUE TO ACQUIRED ATROPHY OF THYROID: ICD-10-CM

## 2023-01-17 DIAGNOSIS — Z12.11 COLON CANCER SCREENING: ICD-10-CM

## 2023-01-17 PROCEDURE — 1160F PR REVIEW ALL MEDS BY PRESCRIBER/CLIN PHARMACIST DOCUMENTED: ICD-10-PCS | Mod: CPTII,S$GLB,, | Performed by: INTERNAL MEDICINE

## 2023-01-17 PROCEDURE — 1159F MED LIST DOCD IN RCRD: CPT | Mod: CPTII,S$GLB,, | Performed by: INTERNAL MEDICINE

## 2023-01-17 PROCEDURE — 3074F SYST BP LT 130 MM HG: CPT | Mod: CPTII,S$GLB,, | Performed by: INTERNAL MEDICINE

## 2023-01-17 PROCEDURE — 3079F PR MOST RECENT DIASTOLIC BLOOD PRESSURE 80-89 MM HG: ICD-10-PCS | Mod: CPTII,S$GLB,, | Performed by: INTERNAL MEDICINE

## 2023-01-17 PROCEDURE — 3008F BODY MASS INDEX DOCD: CPT | Mod: CPTII,S$GLB,, | Performed by: INTERNAL MEDICINE

## 2023-01-17 PROCEDURE — 1160F RVW MEDS BY RX/DR IN RCRD: CPT | Mod: CPTII,S$GLB,, | Performed by: INTERNAL MEDICINE

## 2023-01-17 PROCEDURE — 99396 PR PREVENTIVE VISIT,EST,40-64: ICD-10-PCS | Mod: S$GLB,,, | Performed by: INTERNAL MEDICINE

## 2023-01-17 PROCEDURE — 99396 PREV VISIT EST AGE 40-64: CPT | Mod: S$GLB,,, | Performed by: INTERNAL MEDICINE

## 2023-01-17 PROCEDURE — 3008F PR BODY MASS INDEX (BMI) DOCUMENTED: ICD-10-PCS | Mod: CPTII,S$GLB,, | Performed by: INTERNAL MEDICINE

## 2023-01-17 PROCEDURE — 1159F PR MEDICATION LIST DOCUMENTED IN MEDICAL RECORD: ICD-10-PCS | Mod: CPTII,S$GLB,, | Performed by: INTERNAL MEDICINE

## 2023-01-17 PROCEDURE — 3079F DIAST BP 80-89 MM HG: CPT | Mod: CPTII,S$GLB,, | Performed by: INTERNAL MEDICINE

## 2023-01-17 PROCEDURE — 99999 PR PBB SHADOW E&M-EST. PATIENT-LVL III: CPT | Mod: PBBFAC,,, | Performed by: INTERNAL MEDICINE

## 2023-01-17 PROCEDURE — 99999 PR PBB SHADOW E&M-EST. PATIENT-LVL III: ICD-10-PCS | Mod: PBBFAC,,, | Performed by: INTERNAL MEDICINE

## 2023-01-17 PROCEDURE — 3074F PR MOST RECENT SYSTOLIC BLOOD PRESSURE < 130 MM HG: ICD-10-PCS | Mod: CPTII,S$GLB,, | Performed by: INTERNAL MEDICINE

## 2023-01-17 RX ORDER — LISDEXAMFETAMINE DIMESYLATE 30 MG/1
30 CAPSULE ORAL EVERY MORNING
Qty: 30 CAPSULE | Refills: 0 | Status: SHIPPED | OUTPATIENT
Start: 2023-02-17 | End: 2023-05-30 | Stop reason: SDUPTHER

## 2023-01-17 RX ORDER — LISDEXAMFETAMINE DIMESYLATE 30 MG/1
30 CAPSULE ORAL EVERY MORNING
Qty: 30 CAPSULE | Refills: 0 | Status: SHIPPED | OUTPATIENT
Start: 2023-03-17 | End: 2023-05-30 | Stop reason: SDUPTHER

## 2023-01-17 RX ORDER — LISDEXAMFETAMINE DIMESYLATE 30 MG/1
30 CAPSULE ORAL EVERY MORNING
Qty: 30 CAPSULE | Refills: 0 | Status: SHIPPED | OUTPATIENT
Start: 2023-01-17 | End: 2023-05-30 | Stop reason: SDUPTHER

## 2023-01-17 NOTE — PROGRESS NOTES
Subjective:       Patient ID: Srinivas Snyder Jr. is a 47 y.o. male.    Chief Complaint: No chief complaint on file.    HPI  47 y.o. Male here for annual exam.      Vaccines: Influenza (2018); Tetanus (2015)  Eye exam: declined  Colonoscopy: needs     Exercise: cardio  Diet: regular      Past Medical History:  No date: Anxiety  No date: Hyperlipidemia  No date: Hypothyroidism  No past surgical history on file.  Social History    Socioeconomic History      Marital status:       Spouse name: Not on file      Number of children: 1      Years of education: Not on file      Highest education level: Not on file    Occupational History      Occupation: IT support    Tobacco Use      Smoking status: Never Smoker      Smokeless tobacco: Never Used    Substance and Sexual Activity      Alcohol use: Yes        Alcohol/week: 0.0 standard drinks        Comment: Social       Drug use: No      Sexual activity: Yes        Partners: Female    Other Topics      Concerns:        Not on file    Social History Narrative      Not on file     Social Determinants of Health  Financial Resource Strain: Low Risk       Difficulty of Paying Living Expenses: Not hard at all  Food Insecurity: No Food Insecurity      Worried About Running Out of Food in the Last Year: Never true      Ran Out of Food in the Last Year: Never true  Transportation Needs: No Transportation Needs      Lack of Transportation (Medical): No      Lack of Transportation (Non-Medical): No  Physical Activity: Unknown      Days of Exercise per Week: 2 days      Minutes of Exercise per Session: Not on file  Stress: Stress Concern Present      Feeling of Stress : To some extent  Social Connections: Unknown      Frequency of Communication with Friends and Family: Twice a week      Frequency of Social Gatherings with Friends and Family: Once a week      Attends Hoahaoism Services: Not on file      Active Member of Clubs or Organizations: No      Attends Club or  Organization Meetings: Never      Marital Status: Living with partner  Review of patient's allergies indicates:  No Known Allergies  Review of Systems   Constitutional:  Negative for activity change, appetite change, chills, diaphoresis, fatigue, fever and unexpected weight change.   HENT:  Negative for nasal congestion, mouth sores, postnasal drip, rhinorrhea, sinus pressure/congestion, sneezing, sore throat, trouble swallowing and voice change.    Eyes:  Negative for discharge, itching and visual disturbance.   Respiratory:  Negative for cough, chest tightness, shortness of breath and wheezing.    Cardiovascular:  Negative for chest pain, palpitations and leg swelling.   Gastrointestinal:  Negative for abdominal pain, blood in stool, constipation, diarrhea, nausea and vomiting.   Endocrine: Negative for cold intolerance and heat intolerance.   Genitourinary:  Negative for difficulty urinating, dysuria, flank pain, hematuria and urgency.   Musculoskeletal:  Negative for arthralgias, back pain, myalgias and neck pain.   Integumentary:  Negative for rash and wound.   Allergic/Immunologic: Negative for environmental allergies and food allergies.   Neurological:  Negative for dizziness, tremors, seizures, syncope, weakness and headaches.   Hematological:  Negative for adenopathy. Does not bruise/bleed easily.   Psychiatric/Behavioral:  Positive for decreased concentration. Negative for confusion, dysphoric mood, self-injury, sleep disturbance and suicidal ideas. The patient is not nervous/anxious.        Objective:      Physical Exam  Constitutional:       General: He is not in acute distress.     Appearance: Normal appearance. He is well-developed. He is not ill-appearing, toxic-appearing or diaphoretic.   HENT:      Head: Normocephalic and atraumatic.      Right Ear: External ear normal.      Left Ear: External ear normal.      Nose: Nose normal.      Mouth/Throat:      Pharynx: No oropharyngeal exudate.   Eyes:       General: No scleral icterus.        Right eye: No discharge.         Left eye: No discharge.      Extraocular Movements: Extraocular movements intact.      Conjunctiva/sclera: Conjunctivae normal.      Pupils: Pupils are equal, round, and reactive to light.   Neck:      Thyroid: No thyromegaly.      Vascular: No JVD.   Cardiovascular:      Rate and Rhythm: Normal rate and regular rhythm.      Pulses: Normal pulses.      Heart sounds: Normal heart sounds. No murmur heard.  Pulmonary:      Effort: Pulmonary effort is normal. No respiratory distress.      Breath sounds: Normal breath sounds. No wheezing or rales.   Abdominal:      General: Bowel sounds are normal. There is no distension.      Palpations: Abdomen is soft.      Tenderness: There is no abdominal tenderness. There is no right CVA tenderness, left CVA tenderness, guarding or rebound.   Musculoskeletal:      Cervical back: Normal range of motion and neck supple. No rigidity.      Right lower leg: No edema.      Left lower leg: No edema.   Lymphadenopathy:      Cervical: No cervical adenopathy.   Skin:     General: Skin is warm and dry.      Capillary Refill: Capillary refill takes less than 2 seconds.      Coloration: Skin is not pale.      Findings: No rash.   Neurological:      General: No focal deficit present.      Mental Status: He is alert and oriented to person, place, and time. Mental status is at baseline.      Cranial Nerves: No cranial nerve deficit.      Sensory: No sensory deficit.      Motor: No weakness.      Coordination: Coordination normal.      Gait: Gait normal.      Deep Tendon Reflexes: Reflexes normal.   Psychiatric:         Mood and Affect: Mood normal.         Behavior: Behavior normal.         Thought Content: Thought content normal.         Judgment: Judgment normal.       Assessment:       Problem List Items Addressed This Visit          Psychiatric    Attention deficit disorder    Relevant Medications    lisdexamfetamine  (VYVANSE) 30 MG capsule (Start on 3/17/2023)    lisdexamfetamine (VYVANSE) 30 MG capsule (Start on 2/17/2023)    lisdexamfetamine (VYVANSE) 30 MG capsule       Cardiac/Vascular    Hypercholesteremia       Endocrine    Hypothyroidism     Other Visit Diagnoses       Annual physical exam    -  Primary    Colon cancer screening        Relevant Orders    Ambulatory referral/consult to Endo Procedure             Plan:    Blood work ordered      ADD- stable, refill Vyvanse 30 mg qam      Hypothyroidism- stable on Synthroid 137 mcg daily      HLD- controlled on Lipitor

## 2023-01-31 ENCOUNTER — PATIENT OUTREACH (OUTPATIENT)
Dept: ADMINISTRATIVE | Facility: HOSPITAL | Age: 48
End: 2023-01-31
Payer: COMMERCIAL

## 2023-04-21 ENCOUNTER — PATIENT MESSAGE (OUTPATIENT)
Dept: ADMINISTRATIVE | Facility: HOSPITAL | Age: 48
End: 2023-04-21
Payer: COMMERCIAL

## 2023-04-30 ENCOUNTER — OFFICE VISIT (OUTPATIENT)
Dept: URGENT CARE | Facility: CLINIC | Age: 48
End: 2023-04-30
Payer: COMMERCIAL

## 2023-04-30 VITALS
RESPIRATION RATE: 16 BRPM | BODY MASS INDEX: 24.48 KG/M2 | WEIGHT: 156 LBS | DIASTOLIC BLOOD PRESSURE: 71 MMHG | OXYGEN SATURATION: 97 % | HEART RATE: 66 BPM | HEIGHT: 67 IN | SYSTOLIC BLOOD PRESSURE: 129 MMHG | TEMPERATURE: 98 F

## 2023-04-30 DIAGNOSIS — J06.9 VIRAL URI WITH COUGH: Primary | ICD-10-CM

## 2023-04-30 DIAGNOSIS — J02.9 SORE THROAT: ICD-10-CM

## 2023-04-30 DIAGNOSIS — R05.9 COUGH, UNSPECIFIED TYPE: ICD-10-CM

## 2023-04-30 LAB
CTP QC/QA: YES
SARS-COV-2 AG RESP QL IA.RAPID: NEGATIVE

## 2023-04-30 PROCEDURE — 87811 SARS CORONAVIRUS 2 ANTIGEN POCT, MANUAL READ: ICD-10-PCS | Mod: QW,S$GLB,, | Performed by: NURSE PRACTITIONER

## 2023-04-30 PROCEDURE — 99213 PR OFFICE/OUTPT VISIT, EST, LEVL III, 20-29 MIN: ICD-10-PCS | Mod: S$GLB,,, | Performed by: NURSE PRACTITIONER

## 2023-04-30 PROCEDURE — 87811 SARS-COV-2 COVID19 W/OPTIC: CPT | Mod: QW,S$GLB,, | Performed by: NURSE PRACTITIONER

## 2023-04-30 PROCEDURE — 99213 OFFICE O/P EST LOW 20 MIN: CPT | Mod: S$GLB,,, | Performed by: NURSE PRACTITIONER

## 2023-04-30 RX ORDER — FLUTICASONE PROPIONATE 50 MCG
2 SPRAY, SUSPENSION (ML) NASAL DAILY
Qty: 15.8 ML | Refills: 2 | Status: SHIPPED | OUTPATIENT
Start: 2023-04-30 | End: 2023-05-07

## 2023-04-30 NOTE — PROGRESS NOTES
"Subjective:      Patient ID: Srinivas Snyder Jr. is a 47 y.o. male.    Vitals:  height is 5' 7" (1.702 m) and weight is 70.8 kg (156 lb). His temperature is 98.4 °F (36.9 °C). His blood pressure is 129/71 and his pulse is 66. His respiration is 16 and oxygen saturation is 97%.     Chief Complaint: Cough    This is a 47 y.o. male who presents today with a chief complaint of cough, sore throat, and body soreness that has been going on for a week. Treated with Nyquil and delsym.       Cough  This is a new problem. The current episode started in the past 7 days. The problem has been gradually worsening. The cough is Non-productive. Associated symptoms include a sore throat. Pertinent negatives include no chest pain, chills, ear pain, fever, headaches, myalgias, postnasal drip, rash, shortness of breath or wheezing. Treatments tried: Nyquil and delsym. The treatment provided mild relief. There is no history of asthma, bronchitis or COPD.   Constitution: Positive for generalized weakness. Negative for chills, sweating, fatigue and fever.   HENT:  Positive for sore throat. Negative for ear pain, ear discharge, congestion, postnasal drip, sinus pain, sinus pressure, trouble swallowing and voice change.    Neck: Negative for neck pain and painful lymph nodes.   Cardiovascular:  Negative for chest pain, palpitations and sob on exertion.   Respiratory:  Positive for cough. Negative for chest tightness, sputum production, shortness of breath and wheezing.    Gastrointestinal:  Negative for abdominal pain, nausea, vomiting and diarrhea.   Musculoskeletal:  Negative for muscle ache.   Skin:  Negative for color change, pale and rash.   Allergic/Immunologic: Negative for sneezing.   Neurological:  Negative for headaches.   Hematologic/Lymphatic: Negative for swollen lymph nodes.    Objective:     Physical Exam   Constitutional: He is oriented to person, place, and time. He appears well-developed. He is cooperative.  Non-toxic " appearance. He does not appear ill. No distress.   HENT:   Head: Normocephalic and atraumatic.   Ears:   Right Ear: Hearing, tympanic membrane, external ear and ear canal normal.   Left Ear: Hearing, tympanic membrane, external ear and ear canal normal.   Nose: Nose normal. No mucosal edema, rhinorrhea, nasal deformity or congestion. No epistaxis. Right sinus exhibits no maxillary sinus tenderness and no frontal sinus tenderness. Left sinus exhibits no maxillary sinus tenderness and no frontal sinus tenderness.   Mouth/Throat: Uvula is midline and mucous membranes are normal. No trismus in the jaw. Normal dentition. No uvula swelling. Posterior oropharyngeal erythema present. No oropharyngeal exudate or posterior oropharyngeal edema.   Eyes: Conjunctivae and lids are normal. No scleral icterus.   Neck: Trachea normal and phonation normal. Neck supple. No edema present. No erythema present. No neck rigidity present.   Cardiovascular: Normal rate, regular rhythm and normal heart sounds.   Pulmonary/Chest: Effort normal and breath sounds normal. No stridor. No respiratory distress. He has no decreased breath sounds. He has no wheezes. He has no rhonchi. He has no rales. He exhibits no tenderness.   Abdominal: Normal appearance.   Musculoskeletal: Normal range of motion.         General: No deformity. Normal range of motion.      Cervical back: He exhibits no tenderness.   Lymphadenopathy:     He has no cervical adenopathy.   Neurological: He is alert and oriented to person, place, and time. He exhibits normal muscle tone. Coordination normal.   Skin: Skin is warm, dry, intact, not diaphoretic and not pale.   Psychiatric: His speech is normal and behavior is normal. Judgment and thought content normal.   Nursing note and vitals reviewed.  Results for orders placed or performed in visit on 04/30/23   SARS Coronavirus 2 Antigen, POCT Manual Read   Result Value Ref Range    SARS Coronavirus 2 Antigen Negative Negative      Acceptable Yes        Assessment:     1. Viral URI with cough    2. Cough, unspecified type    3. Sore throat        Plan:       Viral URI with cough  -     fluticasone propionate (FLONASE) 50 mcg/actuation nasal spray; 2 sprays (100 mcg total) by Each Nostril route once daily. for 7 days  Dispense: 15.8 mL; Refill: 2    Cough, unspecified type  -     SARS Coronavirus 2 Antigen, POCT Manual Read    Sore throat  -     POCT Strep A, Molecular                  Patient Instructions   See additional patient Instructions provided    - Rest.    - Drink plenty of fluids.  - Bacterial infections can be treated with oral antibiotics, however, Viral upper respiratory infections will not respond to antibiotics but with simple symptomatic care, typically run their course in 10-14 days.     - Tylenol or Ibuprofen as directed as needed for fever/pain. Avoid tylenol if you have a history of liver disease. Do not take ibuprofen if you have a history of GI bleeding, kidney disease, or if you take blood thinners.     - You can take over-the-counter claritin, zyrtec, allegra, or xyzal as directed. These are antihistamines that can help with runny nose, nasal congestion, sneezing, and helps to dry up post-nasal drip, which usually causes sore throat and cough.   - If you do NOT have high blood pressure, you may use a decongestant form (D)  of this medication (ie. Claritin- D, zyrtec-D, allegra-D) or if you do not take the D form, you can take sudafed (pseudoephedrine) over the counter, which is a decongestant. Do NOT take two decongestant (D) medications at the same time (such as mucinex-D and claritin-D or plain sudafed and claritin D)    - You can use Flonase (fluticasone) nasal spray as directed for sinus congestion and postnasal drip. This is a steroid nasal spray that works locally over time to decrease the inflammation in your nose/sinuses and help with allergic symptoms. This is not an quick- relief spray like  afrin, but it works well if used daily.  Discontinue if you develop nose bleed  - use nasal saline prior to Flonase.  - Use Ocean Spray Nasal Saline 1-3 puffs each nostril every 2-3 hours then blow out onto tissue. This is to irrigate the nasal passage way to clear the sinus openings. Use until sinus problem resolved.    - you can take plain Mucinex (guaifenesin) 1200 mg twice a day to help loosen mucous.     -warm salt water gargles can help with sore throat    - warm tea with honey can help with cough. Honey is a natural cough suppressant.    - Dextromethorphan (DM) is a cough suppressant over the counter (ie. mucinex DM, robitussin, delsym; dayquil/nyquil has DM as well.)    - Follow up with your PCP or specialty clinic as directed in the next 1-2 weeks if not improved or as needed.  You can call [phone] to schedule an appointment with the appropriate provider.      - Go to the ER if you develop new or worsening symptoms.     - You must understand that you have received an Urgent Care treatment only and that you may be released before all of your medical problems are known or treated.   - You, the patient, will arrange for follow up care as instructed.   - If your condition worsens or fails to improve we recommend that you receive another evaluation at the ER immediately or contact your PCP to discuss your concerns or return here.     Patient Instructions   - You must understand that you have received an Urgent Care treatment only and that you may be released before all of your medical problems are known or treated.   - You, the patient, will arrange for follow up care as instructed.   - If your condition worsens or fails to improve we recommend that you receive another evaluation at the ER immediately or contact your PCP to discuss your concerns or return here.     Advised on return/follow-up precautions. Advised on ER precautions. Answered all patient questions. Patient verbalized understanding and  voiced agreement with current treatment plan.

## 2023-04-30 NOTE — PATIENT INSTRUCTIONS
See additional patient Instructions provided    - Rest.    - Drink plenty of fluids.  - Bacterial infections can be treated with oral antibiotics, however, Viral upper respiratory infections will not respond to antibiotics but with simple symptomatic care, typically run their course in 10-14 days.     - Tylenol or Ibuprofen as directed as needed for fever/pain. Avoid tylenol if you have a history of liver disease. Do not take ibuprofen if you have a history of GI bleeding, kidney disease, or if you take blood thinners.     - You can take over-the-counter claritin, zyrtec, allegra, or xyzal as directed. These are antihistamines that can help with runny nose, nasal congestion, sneezing, and helps to dry up post-nasal drip, which usually causes sore throat and cough.   - If you do NOT have high blood pressure, you may use a decongestant form (D)  of this medication (ie. Claritin- D, zyrtec-D, allegra-D) or if you do not take the D form, you can take sudafed (pseudoephedrine) over the counter, which is a decongestant. Do NOT take two decongestant (D) medications at the same time (such as mucinex-D and claritin-D or plain sudafed and claritin D)    - You can use Flonase (fluticasone) nasal spray as directed for sinus congestion and postnasal drip. This is a steroid nasal spray that works locally over time to decrease the inflammation in your nose/sinuses and help with allergic symptoms. This is not an quick- relief spray like afrin, but it works well if used daily.  Discontinue if you develop nose bleed  - use nasal saline prior to Flonase.  - Use Ocean Spray Nasal Saline 1-3 puffs each nostril every 2-3 hours then blow out onto tissue. This is to irrigate the nasal passage way to clear the sinus openings. Use until sinus problem resolved.    - you can take plain Mucinex (guaifenesin) 1200 mg twice a day to help loosen mucous.     -warm salt water gargles can help with sore throat    - warm tea with honey can help with  cough. Honey is a natural cough suppressant.    - Dextromethorphan (DM) is a cough suppressant over the counter (ie. mucinex DM, robitussin, delsym; dayquil/nyquil has DM as well.)    - Follow up with your PCP or specialty clinic as directed in the next 1-2 weeks if not improved or as needed.  You can call [phone] to schedule an appointment with the appropriate provider.      - Go to the ER if you develop new or worsening symptoms.     - You must understand that you have received an Urgent Care treatment only and that you may be released before all of your medical problems are known or treated.   - You, the patient, will arrange for follow up care as instructed.   - If your condition worsens or fails to improve we recommend that you receive another evaluation at the ER immediately or contact your PCP to discuss your concerns or return here.     Patient Instructions   - You must understand that you have received an Urgent Care treatment only and that you may be released before all of your medical problems are known or treated.   - You, the patient, will arrange for follow up care as instructed.   - If your condition worsens or fails to improve we recommend that you receive another evaluation at the ER immediately or contact your PCP to discuss your concerns or return here.     Advised on return/follow-up precautions. Advised on ER precautions. Answered all patient questions. Patient verbalized understanding and voiced agreement with current treatment plan.

## 2023-05-18 ENCOUNTER — PATIENT OUTREACH (OUTPATIENT)
Dept: ADMINISTRATIVE | Facility: HOSPITAL | Age: 48
End: 2023-05-18
Payer: COMMERCIAL

## 2023-05-18 NOTE — PROGRESS NOTES
Health Maintenance Due   Topic Date Due    Hepatitis C Screening  Never done    HIV Screening  Never done    COVID-19 Vaccine (5 - Booster) 07/05/2022    Colorectal Cancer Screening  Never done     Chart reviewed.   Immunizations: Reconciled  Orders placed: N/A  Upcoming appts to satisfy ADENIKE topics: N/A

## 2023-05-22 ENCOUNTER — PATIENT MESSAGE (OUTPATIENT)
Dept: ORTHOPEDICS | Facility: CLINIC | Age: 48
End: 2023-05-22
Payer: COMMERCIAL

## 2023-05-22 DIAGNOSIS — M25.532 LEFT WRIST PAIN: Primary | ICD-10-CM

## 2023-05-23 ENCOUNTER — OFFICE VISIT (OUTPATIENT)
Dept: ORTHOPEDICS | Facility: CLINIC | Age: 48
DRG: 514 | End: 2023-05-23
Payer: COMMERCIAL

## 2023-05-23 ENCOUNTER — HOSPITAL ENCOUNTER (OUTPATIENT)
Dept: RADIOLOGY | Facility: HOSPITAL | Age: 48
Discharge: HOME | End: 2023-05-23
Attending: ORTHOPAEDIC SURGERY
Payer: COMMERCIAL

## 2023-05-23 VITALS — HEIGHT: 68 IN | WEIGHT: 158 LBS | BODY MASS INDEX: 23.95 KG/M2

## 2023-05-23 DIAGNOSIS — M25.532 LEFT WRIST PAIN: ICD-10-CM

## 2023-05-23 DIAGNOSIS — M10.9 ARTHRITIS OF LEFT WRIST DUE TO GOUT: Primary | ICD-10-CM

## 2023-05-23 LAB
APPEARANCE FLD: NORMAL
BODY FLD TYPE: NORMAL
BODY FLD TYPE: NORMAL
COLOR FLD: YELLOW
CRYSTALS FLD MICRO: NEGATIVE
GRAM STN SPEC: NORMAL
GRAM STN SPEC: NORMAL
LYMPHOCYTES NFR FLD MANUAL: 8 %
MONOS+MACROS NFR FLD MANUAL: 20 %
NEUTROPHILS NFR FLD MANUAL: 72 %
WBC # FLD: NORMAL /CU MM

## 2023-05-23 PROCEDURE — 99999 PR PBB SHADOW E&M-EST. PATIENT-LVL III: ICD-10-PCS | Mod: PBBFAC,,, | Performed by: ORTHOPAEDIC SURGERY

## 2023-05-23 PROCEDURE — 1159F PR MEDICATION LIST DOCUMENTED IN MEDICAL RECORD: ICD-10-PCS | Mod: CPTII,S$GLB,, | Performed by: ORTHOPAEDIC SURGERY

## 2023-05-23 PROCEDURE — 3008F PR BODY MASS INDEX (BMI) DOCUMENTED: ICD-10-PCS | Mod: CPTII,S$GLB,, | Performed by: ORTHOPAEDIC SURGERY

## 2023-05-23 PROCEDURE — 99204 OFFICE O/P NEW MOD 45 MIN: CPT | Mod: 25,S$GLB,, | Performed by: ORTHOPAEDIC SURGERY

## 2023-05-23 PROCEDURE — 20605 INTERMEDIATE JOINT ASPIRATION/INJECTION: L RADIOCARPAL: ICD-10-PCS | Mod: LT,S$GLB,, | Performed by: ORTHOPAEDIC SURGERY

## 2023-05-23 PROCEDURE — 3008F BODY MASS INDEX DOCD: CPT | Mod: CPTII,S$GLB,, | Performed by: ORTHOPAEDIC SURGERY

## 2023-05-23 PROCEDURE — 99999 PR PBB SHADOW E&M-EST. PATIENT-LVL III: CPT | Mod: PBBFAC,,, | Performed by: ORTHOPAEDIC SURGERY

## 2023-05-23 PROCEDURE — 89060 EXAM SYNOVIAL FLUID CRYSTALS: CPT | Performed by: ORTHOPAEDIC SURGERY

## 2023-05-23 PROCEDURE — 87077 CULTURE AEROBIC IDENTIFY: CPT | Performed by: ORTHOPAEDIC SURGERY

## 2023-05-23 PROCEDURE — 89051 BODY FLUID CELL COUNT: CPT | Performed by: ORTHOPAEDIC SURGERY

## 2023-05-23 PROCEDURE — 20605 DRAIN/INJ JOINT/BURSA W/O US: CPT | Mod: LT,S$GLB,, | Performed by: ORTHOPAEDIC SURGERY

## 2023-05-23 PROCEDURE — 87186 SC STD MICRODIL/AGAR DIL: CPT | Performed by: ORTHOPAEDIC SURGERY

## 2023-05-23 PROCEDURE — 1159F MED LIST DOCD IN RCRD: CPT | Mod: CPTII,S$GLB,, | Performed by: ORTHOPAEDIC SURGERY

## 2023-05-23 PROCEDURE — 87070 CULTURE OTHR SPECIMN AEROBIC: CPT | Performed by: ORTHOPAEDIC SURGERY

## 2023-05-23 PROCEDURE — 99204 PR OFFICE/OUTPT VISIT, NEW, LEVL IV, 45-59 MIN: ICD-10-PCS | Mod: 25,S$GLB,, | Performed by: ORTHOPAEDIC SURGERY

## 2023-05-23 PROCEDURE — 87205 SMEAR GRAM STAIN: CPT

## 2023-05-23 PROCEDURE — 87075 CULTR BACTERIA EXCEPT BLOOD: CPT | Performed by: ORTHOPAEDIC SURGERY

## 2023-05-23 RX ORDER — METHYLPREDNISOLONE 4 MG/1
TABLET ORAL
Qty: 21 EACH | Refills: 0 | Status: SHIPPED | OUTPATIENT
Start: 2023-05-23 | End: 2023-05-30

## 2023-05-23 RX ORDER — INDOMETHACIN 50 MG/1
50 CAPSULE ORAL 3 TIMES DAILY
Qty: 90 CAPSULE | Refills: 0 | Status: SHIPPED | OUTPATIENT
Start: 2023-05-23

## 2023-05-23 NOTE — PROCEDURES
Intermediate Joint Aspiration/Injection: L radiocarpal    Date/Time: 5/23/2023 2:15 PM  Performed by: Dontae Dewitt MD  Authorized by: Dontae Dewitt MD     Consent Done?:  Yes (Verbal)  Indications:  Pain  Site marked: The procedure site was marked    Timeout: Prior to procedure the correct patient, procedure, and site was verified      Location:  Wrist  Site:  L radiocarpal  Prep: Patient was prepped and draped in usual sterile fashion    Ultrasonic Guidance for needle placement: No  Needle size:  18 G  Approach:  Dorsal  Aspirate amount (ml):  2  Aspirate:  Cloudy and yellow  Lab: Fluid sent for laboratory analysis    Patient tolerance:  Patient tolerated the procedure well with no immediate complications

## 2023-05-23 NOTE — PROGRESS NOTES
[facility]  History & Physical  Orthopedics    SUBJECTIVE:      COVID-19 attestation:  This patient was treated during the COVID-19 pandemic.  This was discussed with the patient, they are aware of our current policies and procedures, were given the option of delaying their visit and or switching to a virtual visit, delaying their surgery when applicable, and they elect to proceed.    Chief Complaint: left wrist and elbow pain    Referring Provider: Amor Reeder, *     History of Present Illness:  Patient is a 47 y.o. right hand dominant male who presents today with complaints of left wrist and elbow pain. He says he began having severe pain in his left wrist starting on Saturday last weekend. He states he was unable to move the wrist and it was equisitely tender. He says the pain was severe enough to go to the ER last night where morphine slightly helped his pain. He is still complaining of a significant amount of pain today. It is slightly red today. He has not noticed any significant swelling. Uric acid drawn in the ED yesterday was 7.8. CRP 12.4. He has no history of gout. No history of surgeries to the hand or wrist.    The patient is a/an .    Onset of symptoms/DOI was Saturday, 5/20.    Symptoms are aggravated by activity, movement, at night, and during the day.    Symptoms are alleviated by  none .    Symptoms consist of pain, erythema, and decreased ROM.    The patient rates their pain as a 6/10.    Attempted treatment(s) and/or interventions include activity modifications, rest, rest, activity modification, anti-inflammatory medications, and narcotic medications.     The patient denies any fevers, chills, N/V, D/C and presents for evaluation.       Past Medical History:   Diagnosis Date    Anxiety     Hyperlipidemia     Hypothyroidism      No past surgical history on file.  Review of patient's allergies indicates:  No Known Allergies  Social History     Social  "History Narrative    Not on file     Family History   Problem Relation Age of Onset    Hyperlipidemia Mother     Hyperlipidemia Father     Diabetes Neg Hx     Hypertension Neg Hx     Heart disease Neg Hx     Stroke Neg Hx          Current Outpatient Medications:     atorvastatin (LIPITOR) 20 MG tablet, TAKE 1 TABLET(20 MG) BY MOUTH EVERY DAY, Disp: 90 tablet, Rfl: 3    HYDROcodone-acetaminophen (NORCO) 5-325 mg per tablet, Take 1 tablet by mouth every 6 (six) hours as needed for Pain., Disp: 12 tablet, Rfl: 0    ibuprofen (ADVIL,MOTRIN) 600 MG tablet, Take 1 tablet (600 mg total) by mouth every 6 (six) hours as needed for Pain., Disp: 20 tablet, Rfl: 0    levothyroxine (SYNTHROID) 137 MCG Tab tablet, Take 1 tablet (137 mcg total) by mouth before breakfast., Disp: 90 tablet, Rfl: 3    lisdexamfetamine (VYVANSE) 30 MG capsule, Take 1 capsule (30 mg total) by mouth every morning., Disp: 30 capsule, Rfl: 0    lisdexamfetamine (VYVANSE) 30 MG capsule, Take 1 capsule (30 mg total) by mouth every morning., Disp: 30 capsule, Rfl: 0    lisdexamfetamine (VYVANSE) 30 MG capsule, Take 1 capsule (30 mg total) by mouth every morning., Disp: 30 capsule, Rfl: 0  No current facility-administered medications for this visit.      Review of Systems:  As per HPI otherwise noncontributory    OBJECTIVE:      Vital Signs (Most Recent):  Vitals:    05/23/23 1400   Weight: 71.7 kg (158 lb)   Height: 5' 8" (1.727 m)     Body mass index is 24.02 kg/m².      Physical Exam:  Constitutional: The patient appears well-developed and well-nourished. No distress.   Skin: No lesions appreciated  Head: Normocephalic and atraumatic.   Nose: Nose normal.   Ears: No deformities seen  Eyes: Conjunctivae and EOM are normal.   Neck: No tracheal deviation present.   Cardiovascular: Normal rate and intact distal pulses.    Pulmonary/Chest: Effort normal. No respiratory distress.   Abdominal: There is no guarding.   Neurological: The patient is alert. "   Psychiatric: The patient has a normal mood and affect.     Left Hand/Wrist Examination:    Observation/Inspection:  Swelling  none    Deformity  none  Discoloration  Mild erythema dorsal wrist     Scars   none    Atrophy  none    HAND/WRIST EXAMINATION:  Finkelstein's Test   Neg  WHAT Test    Neg  Snuff box tenderness   Neg  Rain's Test    Neg  Hook of Hamate Tenderness  Neg  CMC grind    Neg  Circumduction test   Neg    Neurovascular Exam:  Digits WWP, brisk CR < 3s throughout  NVI motor/LTS to M/R/U nerves, radial pulse 2+  Tinel's Test - Carpal Tunnel  Neg  Tinel's Test - Cubital Tunnel  Neg  Phalen's Test    Neg  Median Nerve Compression Test Neg    ROM hand full, painless    ROM wrist with decreased extension and flexion  Extreme tenderness mainly over dorsal wrist that extends up to elbow; however most severely tender at the wrist    ROM elbow full, mild pain with full flexion    Abdomen not guarded  Respirations nonlabored  Perfusion intact    Diagnostic Results:     Imaging - I independently viewed the patient's imaging as well as the radiology report.  Xrays of the patient's left wrist  demonstrate no evidence of any acute fractures or dislocations or significant degenerative changes.    EMG - none    ASSESSMENT/PLAN:      47 y.o. yo male with left wrist pain likely 2/2 to gout vs. septic arthritis  Plan: The patient and I had a thorough discussion today.  We discussed the working diagnosis as well as several other potential alternative diagnoses.  Treatment options were discussed, both conservative and surgical.  Conservative treatment options would include things such as activity modifications, workplace modifications, a period of rest, oral vs topical OTC and prescription anti-inflammatory medications, occupational therapy, splinting/bracing, immobilization, corticosteroid injections, and others.  Surgical options were discussed as well.     At this time, the patient would like to proceed with a  trial of aspiration of the left wrist joint to assess for WBC, crystals, gram stain, and cultures. We also sent a medrol dosepak as well as indomethacin to his pharmacy for suspected gout. We will follow his joint fluid labs and cultures and call him with results. If gout, we will refer him to rheumatology and have him followup with his PCP.     Should the patient's symptoms worsen, persist, or fail to improve they should return for reevaluation and I would be happy to see them back anytime.        Dontae Dewitt M.D.    Please be aware that this note has been generated with the assistance of [employer] voice-to-text.  Please excuse any spelling or grammatical errors.    Thank you for choosing Dr. Dontae Dewitt for your orthopedic hand and upper extremity care. It is our goal to provide you with exceptional care that will help keep you healthy, active, and get you back in the game.     If you felt that you received exemplary care today, please consider leaving feedback for Dr. Dewitt on [employer] at [email or web address].    Please do not hesitate to reach out to us via email, phone, or MyChart with any questions, concerns, or feedback.

## 2023-05-24 ENCOUNTER — ANESTHESIA EVENT (OUTPATIENT)
Dept: SURGERY | Facility: HOSPITAL | Age: 48
DRG: 514 | End: 2023-05-24
Payer: COMMERCIAL

## 2023-05-24 ENCOUNTER — TELEPHONE (OUTPATIENT)
Dept: ORTHOPEDICS | Facility: CLINIC | Age: 48
End: 2023-05-24
Payer: COMMERCIAL

## 2023-05-24 ENCOUNTER — HOSPITAL ENCOUNTER (INPATIENT)
Facility: HOSPITAL | Age: 48
LOS: 3 days | Discharge: HOME HEALTH | DRG: 514 | End: 2023-05-27
Attending: ORTHOPAEDIC SURGERY | Admitting: ORTHOPAEDIC SURGERY
Payer: COMMERCIAL

## 2023-05-24 ENCOUNTER — PATIENT MESSAGE (OUTPATIENT)
Dept: ORTHOPEDICS | Facility: CLINIC | Age: 48
End: 2023-05-24

## 2023-05-24 ENCOUNTER — ANESTHESIA (OUTPATIENT)
Dept: SURGERY | Facility: HOSPITAL | Age: 48
DRG: 514 | End: 2023-05-24
Payer: COMMERCIAL

## 2023-05-24 DIAGNOSIS — M00.9 PYOGENIC ARTHRITIS OF LEFT WRIST, DUE TO UNSPECIFIED ORGANISM: Primary | ICD-10-CM

## 2023-05-24 DIAGNOSIS — M00.9 PYOGENIC ARTHRITIS OF LEFT WRIST, DUE TO UNSPECIFIED ORGANISM: ICD-10-CM

## 2023-05-24 DIAGNOSIS — M00.9: Primary | ICD-10-CM

## 2023-05-24 DIAGNOSIS — M00.9: ICD-10-CM

## 2023-05-24 LAB
CRP SERPL-MCNC: 63.2 MG/L (ref 0–8.2)
ERYTHROCYTE [SEDIMENTATION RATE] IN BLOOD BY PHOTOMETRIC METHOD: 67 MM/HR (ref 0–23)
PATH INTERP FLD-IMP: NORMAL

## 2023-05-24 PROCEDURE — 29846 PR WRIST ARTHROSCOP,EXCIS TRIANG CART: ICD-10-PCS | Mod: LT,,, | Performed by: ORTHOPAEDIC SURGERY

## 2023-05-24 PROCEDURE — 25000003 PHARM REV CODE 250: Performed by: STUDENT IN AN ORGANIZED HEALTH CARE EDUCATION/TRAINING PROGRAM

## 2023-05-24 PROCEDURE — 25000003 PHARM REV CODE 250: Performed by: NURSE ANESTHETIST, CERTIFIED REGISTERED

## 2023-05-24 PROCEDURE — 63600175 PHARM REV CODE 636 W HCPCS: Performed by: ORTHOPAEDIC SURGERY

## 2023-05-24 PROCEDURE — 63600175 PHARM REV CODE 636 W HCPCS: Performed by: ANESTHESIOLOGY

## 2023-05-24 PROCEDURE — 85652 RBC SED RATE AUTOMATED: CPT | Performed by: STUDENT IN AN ORGANIZED HEALTH CARE EDUCATION/TRAINING PROGRAM

## 2023-05-24 PROCEDURE — 99223 PR INITIAL HOSPITAL CARE,LEVL III: ICD-10-PCS | Mod: 57,,, | Performed by: ORTHOPAEDIC SURGERY

## 2023-05-24 PROCEDURE — 63600175 PHARM REV CODE 636 W HCPCS: Performed by: STUDENT IN AN ORGANIZED HEALTH CARE EDUCATION/TRAINING PROGRAM

## 2023-05-24 PROCEDURE — 94761 N-INVAS EAR/PLS OXIMETRY MLT: CPT

## 2023-05-24 PROCEDURE — 63600175 PHARM REV CODE 636 W HCPCS

## 2023-05-24 PROCEDURE — 29846 WRIST ARTHROSCOPY/SURGERY: CPT | Mod: LT,,, | Performed by: ORTHOPAEDIC SURGERY

## 2023-05-24 PROCEDURE — 36415 COLL VENOUS BLD VENIPUNCTURE: CPT | Performed by: STUDENT IN AN ORGANIZED HEALTH CARE EDUCATION/TRAINING PROGRAM

## 2023-05-24 PROCEDURE — 63600175 PHARM REV CODE 636 W HCPCS: Performed by: NURSE ANESTHETIST, CERTIFIED REGISTERED

## 2023-05-24 PROCEDURE — 36000708 HC OR TIME LEV III 1ST 15 MIN: Performed by: ORTHOPAEDIC SURGERY

## 2023-05-24 PROCEDURE — 71000015 HC POSTOP RECOV 1ST HR: Performed by: ORTHOPAEDIC SURGERY

## 2023-05-24 PROCEDURE — 99223 1ST HOSP IP/OBS HIGH 75: CPT | Mod: 57,,, | Performed by: ORTHOPAEDIC SURGERY

## 2023-05-24 PROCEDURE — D9220A PRA ANESTHESIA: ICD-10-PCS | Mod: ANES,,, | Performed by: ANESTHESIOLOGY

## 2023-05-24 PROCEDURE — 36415 COLL VENOUS BLD VENIPUNCTURE: CPT | Performed by: ORTHOPAEDIC SURGERY

## 2023-05-24 PROCEDURE — 37000009 HC ANESTHESIA EA ADD 15 MINS: Performed by: ORTHOPAEDIC SURGERY

## 2023-05-24 PROCEDURE — 27201423 OPTIME MED/SURG SUP & DEVICES STERILE SUPPLY: Performed by: ORTHOPAEDIC SURGERY

## 2023-05-24 PROCEDURE — 36000709 HC OR TIME LEV III EA ADD 15 MIN: Performed by: ORTHOPAEDIC SURGERY

## 2023-05-24 PROCEDURE — 11000001 HC ACUTE MED/SURG PRIVATE ROOM

## 2023-05-24 PROCEDURE — D9220A PRA ANESTHESIA: Mod: CRNA,,, | Performed by: NURSE ANESTHETIST, CERTIFIED REGISTERED

## 2023-05-24 PROCEDURE — 71000033 HC RECOVERY, INTIAL HOUR: Performed by: ORTHOPAEDIC SURGERY

## 2023-05-24 PROCEDURE — 86140 C-REACTIVE PROTEIN: CPT | Performed by: ORTHOPAEDIC SURGERY

## 2023-05-24 PROCEDURE — 25000003 PHARM REV CODE 250: Performed by: ORTHOPAEDIC SURGERY

## 2023-05-24 PROCEDURE — D9220A PRA ANESTHESIA: ICD-10-PCS | Mod: CRNA,,, | Performed by: NURSE ANESTHETIST, CERTIFIED REGISTERED

## 2023-05-24 PROCEDURE — 37000008 HC ANESTHESIA 1ST 15 MINUTES: Performed by: ORTHOPAEDIC SURGERY

## 2023-05-24 PROCEDURE — D9220A PRA ANESTHESIA: Mod: ANES,,, | Performed by: ANESTHESIOLOGY

## 2023-05-24 RX ORDER — ONDANSETRON 2 MG/ML
4 INJECTION INTRAMUSCULAR; INTRAVENOUS ONCE AS NEEDED
Status: DISCONTINUED | OUTPATIENT
Start: 2023-05-24 | End: 2023-05-24 | Stop reason: HOSPADM

## 2023-05-24 RX ORDER — FENTANYL CITRATE 50 UG/ML
INJECTION, SOLUTION INTRAMUSCULAR; INTRAVENOUS
Status: COMPLETED
Start: 2023-05-24 | End: 2023-05-24

## 2023-05-24 RX ORDER — OXYCODONE HYDROCHLORIDE 10 MG/1
10 TABLET ORAL EVERY 4 HOURS PRN
Status: DISCONTINUED | OUTPATIENT
Start: 2023-05-24 | End: 2023-05-27 | Stop reason: HOSPADM

## 2023-05-24 RX ORDER — ACETAMINOPHEN 325 MG/1
650 TABLET ORAL
Status: DISCONTINUED | OUTPATIENT
Start: 2023-05-24 | End: 2023-05-27 | Stop reason: HOSPADM

## 2023-05-24 RX ORDER — TALC
6 POWDER (GRAM) TOPICAL NIGHTLY PRN
Status: DISCONTINUED | OUTPATIENT
Start: 2023-05-24 | End: 2023-05-27 | Stop reason: HOSPADM

## 2023-05-24 RX ORDER — LIDOCAINE HYDROCHLORIDE 10 MG/ML
1 INJECTION, SOLUTION EPIDURAL; INFILTRATION; INTRACAUDAL; PERINEURAL ONCE AS NEEDED
Status: DISCONTINUED | OUTPATIENT
Start: 2023-05-24 | End: 2023-05-27 | Stop reason: HOSPADM

## 2023-05-24 RX ORDER — SODIUM CHLORIDE 0.9 % (FLUSH) 0.9 %
10 SYRINGE (ML) INJECTION
Status: DISCONTINUED | OUTPATIENT
Start: 2023-05-24 | End: 2023-05-27 | Stop reason: HOSPADM

## 2023-05-24 RX ORDER — DEXAMETHASONE SODIUM PHOSPHATE 4 MG/ML
INJECTION, SOLUTION INTRA-ARTICULAR; INTRALESIONAL; INTRAMUSCULAR; INTRAVENOUS; SOFT TISSUE
Status: DISCONTINUED | OUTPATIENT
Start: 2023-05-24 | End: 2023-05-24

## 2023-05-24 RX ORDER — CELECOXIB 100 MG/1
100 CAPSULE ORAL DAILY
Status: DISCONTINUED | OUTPATIENT
Start: 2023-05-25 | End: 2023-05-27 | Stop reason: HOSPADM

## 2023-05-24 RX ORDER — PHENYLEPHRINE HYDROCHLORIDE 10 MG/ML
INJECTION INTRAVENOUS
Status: DISCONTINUED | OUTPATIENT
Start: 2023-05-24 | End: 2023-05-24

## 2023-05-24 RX ORDER — MUPIROCIN 20 MG/G
OINTMENT TOPICAL
Status: CANCELLED | OUTPATIENT
Start: 2023-05-24

## 2023-05-24 RX ORDER — ONDANSETRON 8 MG/1
8 TABLET, ORALLY DISINTEGRATING ORAL EVERY 8 HOURS PRN
Status: DISCONTINUED | OUTPATIENT
Start: 2023-05-24 | End: 2023-05-27 | Stop reason: HOSPADM

## 2023-05-24 RX ORDER — MUPIROCIN 20 MG/G
OINTMENT TOPICAL
Status: DISCONTINUED | OUTPATIENT
Start: 2023-05-24 | End: 2023-05-24 | Stop reason: HOSPADM

## 2023-05-24 RX ORDER — HYDROMORPHONE HYDROCHLORIDE 1 MG/ML
0.2 INJECTION, SOLUTION INTRAMUSCULAR; INTRAVENOUS; SUBCUTANEOUS EVERY 5 MIN PRN
Status: DISCONTINUED | OUTPATIENT
Start: 2023-05-24 | End: 2023-05-24 | Stop reason: HOSPADM

## 2023-05-24 RX ORDER — FENTANYL CITRATE 50 UG/ML
INJECTION, SOLUTION INTRAMUSCULAR; INTRAVENOUS
Status: DISCONTINUED | OUTPATIENT
Start: 2023-05-24 | End: 2023-05-24

## 2023-05-24 RX ORDER — FENTANYL CITRATE 50 UG/ML
50 INJECTION, SOLUTION INTRAMUSCULAR; INTRAVENOUS EVERY 10 MIN PRN
Status: DISCONTINUED | OUTPATIENT
Start: 2023-05-24 | End: 2023-05-27 | Stop reason: HOSPADM

## 2023-05-24 RX ORDER — FENTANYL CITRATE 50 UG/ML
25 INJECTION, SOLUTION INTRAMUSCULAR; INTRAVENOUS EVERY 5 MIN PRN
Status: COMPLETED | OUTPATIENT
Start: 2023-05-24 | End: 2023-05-24

## 2023-05-24 RX ORDER — LIDOCAINE HYDROCHLORIDE 20 MG/ML
INJECTION, SOLUTION EPIDURAL; INFILTRATION; INTRACAUDAL; PERINEURAL
Status: DISCONTINUED | OUTPATIENT
Start: 2023-05-24 | End: 2023-05-24

## 2023-05-24 RX ORDER — OXYCODONE HYDROCHLORIDE 5 MG/1
5 TABLET ORAL
Status: DISCONTINUED | OUTPATIENT
Start: 2023-05-24 | End: 2023-05-24 | Stop reason: HOSPADM

## 2023-05-24 RX ORDER — MIDAZOLAM HYDROCHLORIDE 1 MG/ML
INJECTION, SOLUTION INTRAMUSCULAR; INTRAVENOUS
Status: DISCONTINUED | OUTPATIENT
Start: 2023-05-24 | End: 2023-05-24

## 2023-05-24 RX ORDER — PROPOFOL 10 MG/ML
VIAL (ML) INTRAVENOUS
Status: DISCONTINUED | OUTPATIENT
Start: 2023-05-24 | End: 2023-05-24

## 2023-05-24 RX ORDER — ONDANSETRON 2 MG/ML
INJECTION INTRAMUSCULAR; INTRAVENOUS
Status: DISCONTINUED | OUTPATIENT
Start: 2023-05-24 | End: 2023-05-24

## 2023-05-24 RX ORDER — HALOPERIDOL 5 MG/ML
0.5 INJECTION INTRAMUSCULAR EVERY 10 MIN PRN
Status: DISCONTINUED | OUTPATIENT
Start: 2023-05-24 | End: 2023-05-24 | Stop reason: HOSPADM

## 2023-05-24 RX ORDER — OXYCODONE HYDROCHLORIDE 5 MG/1
5 TABLET ORAL EVERY 4 HOURS PRN
Status: DISCONTINUED | OUTPATIENT
Start: 2023-05-24 | End: 2023-05-27 | Stop reason: HOSPADM

## 2023-05-24 RX ADMIN — FENTANYL CITRATE 50 MCG: 50 INJECTION, SOLUTION INTRAMUSCULAR; INTRAVENOUS at 05:05

## 2023-05-24 RX ADMIN — FENTANYL CITRATE 25 MCG: 50 INJECTION, SOLUTION INTRAMUSCULAR; INTRAVENOUS at 06:05

## 2023-05-24 RX ADMIN — FENTANYL CITRATE 25 MCG: 50 INJECTION INTRAMUSCULAR; INTRAVENOUS at 07:05

## 2023-05-24 RX ADMIN — LIDOCAINE HYDROCHLORIDE 50 MG: 20 INJECTION, SOLUTION EPIDURAL; INFILTRATION; INTRACAUDAL; PERINEURAL at 05:05

## 2023-05-24 RX ADMIN — SODIUM CHLORIDE: 9 INJECTION, SOLUTION INTRAVENOUS at 05:05

## 2023-05-24 RX ADMIN — ACETAMINOPHEN 650 MG: 325 TABLET ORAL at 07:05

## 2023-05-24 RX ADMIN — MIDAZOLAM HYDROCHLORIDE 2 MG: 2 INJECTION, SOLUTION INTRAMUSCULAR; INTRAVENOUS at 05:05

## 2023-05-24 RX ADMIN — OXYCODONE HYDROCHLORIDE 10 MG: 10 TABLET ORAL at 07:05

## 2023-05-24 RX ADMIN — FENTANYL CITRATE 25 MCG: 50 INJECTION INTRAMUSCULAR; INTRAVENOUS at 08:05

## 2023-05-24 RX ADMIN — CEFTRIAXONE 1 G: 1 INJECTION, POWDER, FOR SOLUTION INTRAMUSCULAR; INTRAVENOUS at 09:05

## 2023-05-24 RX ADMIN — PHENYLEPHRINE HYDROCHLORIDE 100 MCG: 10 INJECTION INTRAVENOUS at 07:05

## 2023-05-24 RX ADMIN — VANCOMYCIN HYDROCHLORIDE 1500 MG: 1.5 INJECTION, POWDER, LYOPHILIZED, FOR SOLUTION INTRAVENOUS at 05:05

## 2023-05-24 RX ADMIN — ONDANSETRON 8 MG: 8 TABLET, ORALLY DISINTEGRATING ORAL at 07:05

## 2023-05-24 RX ADMIN — PROPOFOL 200 MG: 10 INJECTION, EMULSION INTRAVENOUS at 05:05

## 2023-05-24 RX ADMIN — ONDANSETRON 4 MG: 2 INJECTION INTRAMUSCULAR; INTRAVENOUS at 06:05

## 2023-05-24 RX ADMIN — DEXAMETHASONE SODIUM PHOSPHATE 4 MG: 4 INJECTION INTRA-ARTICULAR; INTRALESIONAL; INTRAMUSCULAR; INTRAVENOUS; SOFT TISSUE at 06:05

## 2023-05-24 RX ADMIN — FENTANYL CITRATE 100 MCG: 50 INJECTION, SOLUTION INTRAMUSCULAR; INTRAVENOUS at 05:05

## 2023-05-24 NOTE — TELEPHONE ENCOUNTER
Attempted contact regarding wrist aspirate.  No answer, voicemail left requesting callback and portal message.  Will remain available.

## 2023-05-24 NOTE — TELEPHONE ENCOUNTER
Called and spoke with patient.  Reviewed his wrist aspiration results from yesterday's clinic visit.  These are consistent with septic arthritis.  Recommendation made for admission to the hospital and surgical washout tonight.  Patient reminded to remain NPO effective now.  He was referred to the admit office and all questions were answered.

## 2023-05-24 NOTE — TELEPHONE ENCOUNTER
Called patient and left another voicemail requesting callback.    Also called his spouse and left a voicemail requesting callback.

## 2023-05-24 NOTE — NURSING
Pt arrived to floor AAOX4. VSS. Pain controlled. L hand swollen and redness noted. NPO for surgery today. IV place to R AC. Ambulating in room. Prepped for surgery, MD at bedside to sign consents. Safety and fall precautions in place.

## 2023-05-24 NOTE — NURSING
Nurses Note -- 4 Eyes      5/24/2023   6:35 PM      Skin assessed during: Admit      [x] No Altered Skin Integrity Present    []Prevention Measures Documented      [] Yes- Altered Skin Integrity Present or Discovered   [] LDA Added if Not in Epic (Describe Wound)   [] New Altered Skin Integrity was Present on Admit and Documented in LDA   [] Wound Image Taken    Wound Care Consulted? NO    Attending Nurse:  Mary Price RN     Second RN/Staff Member:  Lb Jang RN

## 2023-05-24 NOTE — H&P
[facility]  History & Physical  Orthopedics     SUBJECTIVE:       COVID-19 attestation:  This patient was treated during the COVID-19 pandemic.  This was discussed with the patient, they are aware of our current policies and procedures, were given the option of delaying their visit and or switching to a virtual visit, delaying their surgery when applicable, and they elect to proceed.     Chief Complaint: left wrist septic arthritis     Referring Provider: Amor Reeder, *      History of Present Illness:  Patient is a 47 y.o. right hand dominant male who presents today with complaints of left wrist and elbow pain. He says he began having severe pain in his left wrist starting on Saturday last weekend. He states he was unable to move the wrist and it was equisitely tender. He says the pain was severe enough to go to the ER last night where morphine slightly helped his pain. He is still complaining of a significant amount of pain today. It is slightly red today. He has not noticed any significant swelling. Uric acid drawn in the ED yesterday was 7.8. CRP 12.4. He has no history of gout. No history of surgeries to the hand or wrist.    The patient is a/an .     Onset of symptoms/DOI was Saturday, 5/20.     Symptoms are aggravated by activity, movement, at night, and during the day.     Symptoms are alleviated by  none .     Symptoms consist of pain, erythema, and decreased ROM.     The patient rates their pain as a 6/10.     Attempted treatment(s) and/or interventions include activity modifications, rest, rest, activity modification, anti-inflammatory medications, and narcotic medications.     The patient denies any fevers, chills, N/V, D/C and presents for evaluation.             Past Medical History:   Diagnosis Date    Anxiety      Hyperlipidemia      Hypothyroidism        No past surgical history on file.  Review of patient's allergies indicates:  No Known  "Allergies  Social History          Social History Narrative    Not on file            Family History   Problem Relation Age of Onset    Hyperlipidemia Mother      Hyperlipidemia Father      Diabetes Neg Hx      Hypertension Neg Hx      Heart disease Neg Hx      Stroke Neg Hx              Current Outpatient Medications:     atorvastatin (LIPITOR) 20 MG tablet, TAKE 1 TABLET(20 MG) BY MOUTH EVERY DAY, Disp: 90 tablet, Rfl: 3    HYDROcodone-acetaminophen (NORCO) 5-325 mg per tablet, Take 1 tablet by mouth every 6 (six) hours as needed for Pain., Disp: 12 tablet, Rfl: 0    ibuprofen (ADVIL,MOTRIN) 600 MG tablet, Take 1 tablet (600 mg total) by mouth every 6 (six) hours as needed for Pain., Disp: 20 tablet, Rfl: 0    levothyroxine (SYNTHROID) 137 MCG Tab tablet, Take 1 tablet (137 mcg total) by mouth before breakfast., Disp: 90 tablet, Rfl: 3    lisdexamfetamine (VYVANSE) 30 MG capsule, Take 1 capsule (30 mg total) by mouth every morning., Disp: 30 capsule, Rfl: 0    lisdexamfetamine (VYVANSE) 30 MG capsule, Take 1 capsule (30 mg total) by mouth every morning., Disp: 30 capsule, Rfl: 0    lisdexamfetamine (VYVANSE) 30 MG capsule, Take 1 capsule (30 mg total) by mouth every morning., Disp: 30 capsule, Rfl: 0  No current facility-administered medications for this visit.        Review of Systems:  As per HPI otherwise noncontributory     OBJECTIVE:       Vital Signs (Most Recent):  Vitals       Vitals:     05/23/23 1400   Weight: 71.7 kg (158 lb)   Height: 5' 8" (1.727 m)         Body mass index is 24.02 kg/m².        Physical Exam:  Constitutional: The patient appears well-developed and well-nourished. No distress.   Skin: No lesions appreciated  Head: Normocephalic and atraumatic.   Nose: Nose normal.   Ears: No deformities seen  Eyes: Conjunctivae and EOM are normal.   Neck: No tracheal deviation present.   Cardiovascular: Normal rate and intact distal pulses.    Pulmonary/Chest: Effort normal. No respiratory distress. "   Abdominal: There is no guarding.   Neurological: The patient is alert.   Psychiatric: The patient has a normal mood and affect.      Left Hand/Wrist Examination:     Observation/Inspection:  Swelling                       none                  Deformity                     none  Discoloration               Mild erythema dorsal wrist                    Scars                           none                  Atrophy                        none     HAND/WRIST EXAMINATION:  Finkelstein's Test                                Neg  WHAT Test                                         Neg  Snuff box tenderness                          Neg  Rain's Test                                     Neg  Hook of Hamate Tenderness              Neg  CMC grind                                           Neg  Circumduction test                              Neg     Neurovascular Exam:  Digits WWP, brisk CR < 3s throughout  NVI motor/LTS to M/R/U nerves, radial pulse 2+  Tinel's Test - Carpal Tunnel                Neg  Tinel's Test - Cubital Tunnel               Neg  Phalen's Test                                      Neg  Median Nerve Compression Test       Neg     ROM hand full, painless     ROM wrist with decreased extension and flexion  Extreme tenderness mainly over dorsal wrist that extends up to elbow; however most severely tender at the wrist    ROM elbow full, mild pain with full flexion     Abdomen not guarded  Respirations nonlabored  Perfusion intact     Diagnostic Results:     Imaging - I independently viewed the patient's imaging as well as the radiology report.  Xrays of the patient's left wrist  demonstrate no evidence of any acute fractures or dislocations or significant degenerative changes.     EMG - none     ASSESSMENT/PLAN:       47 y.o. yo male with left wrist pain 2/2 septic arthritis    Plan: The patient and I had a thorough discussion today.  We discussed the working diagnosis as well as several other potential alternative  diagnoses.  Treatment options were discussed, both conservative and surgical.  Conservative treatment options would include things such as activity modifications, workplace modifications, a period of rest, oral vs topical OTC and prescription anti-inflammatory medications, occupational therapy, splinting/bracing, immobilization, corticosteroid injections, and others.  Surgical options were discussed as well.      Joint fluid analysis:   WBC: 911987  Segs: 72%  Crystals: None  Cultures growing staph aureus    Plan for OR today for left wrist open vs arthroscopic I&D     Should the patient's symptoms worsen, persist, or fail to improve they should return for reevaluation and I would be happy to see them back anytime.          Dontae Dewitt M.D.     Please be aware that this note has been generated with the assistance of [employer] voice-to-text.  Please excuse any spelling or grammatical errors.     Thank you for choosing Dr. Dontae Dewitt for your orthopedic hand and upper extremity care. It is our goal to provide you with exceptional care that will help keep you healthy, active, and get you back in the game.     If you felt that you received exemplary care today, please consider leaving feedback for Dr. Dewitt on [employer] at [email or web address].     Please do not hesitate to reach out to us via email, phone, or MyChart with any questions, concerns, or feedback.

## 2023-05-24 NOTE — PROGRESS NOTES
Called and spoke with patient.  Updated that his cultures are growing staph.  Informed again of the emergent nature of septic arthritis and recommended immediate presentation to [facility] in preparation for surgery. He understands and will proceed accordingly.

## 2023-05-24 NOTE — PROGRESS NOTES
Pharmacokinetic Initial Assessment: IV Vancomycin    Assessment/Plan:    Initiate intravenous vancomycin with loading dose of 1500 mg once with subsequent doses when random concentrations are less than 20 mcg/mL  Desired empiric serum trough concentration is 15 to 20 mcg/mL  Draw vancomycin random level on 5/25 at 1730.  Pharmacy will continue to follow and monitor vancomycin.      Please contact pharmacy at extension [phone] with any questions regarding this assessment.     Thank you for the consult,   Eileen Jeffers       Patient brief summary:  Srinivas Snyder . is a 47 y.o. male initiated on antimicrobial therapy with IV Vancomycin for treatment of suspected bone/joint infection    Drug Allergies:   Review of patient's allergies indicates:  No Known Allergies    Actual Body Weight:   71.7 kg    Renal Function:   Estimated Creatinine Clearance: 64.5 mL/min (based on SCr of 1.4 mg/dL).,     Dialysis Method (if applicable):  N/A

## 2023-05-24 NOTE — PLAN OF CARE
Pre op check list completed. Pt awake, oriented x4, room air. C/O pain in L wrist. Pre op orders for fentanyl IV received, and administered. Call light within reach. Bed in low position, side rails raised x2. Will continue to monitor.

## 2023-05-24 NOTE — ANESTHESIA PREPROCEDURE EVALUATION
05/24/2023  Srinivas Snyder . is a 47 y.o., male with pyogenic arthritis of left wrist who is scheuled for wrist arthroscopy      Pre-op Assessment    I have reviewed the Patient Summary Reports.    I have reviewed the NPO Status.      Review of Systems  Anesthesia Hx:  No problems with previous Anesthesia  History of prior surgery of interest to airway management or planning: Previous anesthesia: General Denies Family Hx of Anesthesia complications.   Denies Personal Hx of Anesthesia complications.   Social:  Non-Smoker, Social Alcohol Use    Cardiovascular:  Cardiovascular Normal Exercise tolerance: good     Pulmonary:  Pulmonary Normal  Denies COPD.  Denies Asthma.  Denies Recent URI.  Denies Sleep Apnea.    Neurological:  Neurology Normal Denies TIA.  Denies CVA. Denies Seizures.    Endocrine:   Hypothyroidism    Psych:   ADD         Physical Exam  General: Well nourished, Cooperative, Alert and Oriented  Full beard   Airway:  Mallampati: II   Mouth Opening: Normal  TM Distance: Normal  Tongue: Normal  Neck ROM: Normal ROM    Dental:  Intact    Chest/Lungs:  Normal Respiratory Rate    Heart:  Rate: Normal  Rhythm: Regular Rhythm        Anesthesia Plan  Type of Anesthesia, risks & benefits discussed:    Anesthesia Type: Gen ETT  Intra-op Monitoring Plan: Standard ASA Monitors  Induction:  IV  Informed Consent: Informed consent signed with the Patient and all parties understand the risks and agree with anesthesia plan.  All questions answered.   ASA Score: 2    Ready For Surgery From Anesthesia Perspective.     .

## 2023-05-24 NOTE — TELEPHONE ENCOUNTER
2x attempts to contact patient and his wife regarding urgent lab results. Phones both go directly to .    Voicemails left, have requested call back.     Grace Garcia MS, OTC  Clinical & OR Assistant to Dr. Ross Dunbar [facility]  [phone]

## 2023-05-25 PROBLEM — M00.9 SEPTIC ARTHRITIS OF WRIST, LEFT: Status: ACTIVE | Noted: 2023-05-25

## 2023-05-25 LAB
VANCOMYCIN SERPL-MCNC: 5.8 UG/ML
VANCOMYCIN TROUGH SERPL-MCNC: 4.7 UG/ML (ref 10–22)

## 2023-05-25 PROCEDURE — 99223 1ST HOSP IP/OBS HIGH 75: CPT | Mod: ,,, | Performed by: INTERNAL MEDICINE

## 2023-05-25 PROCEDURE — 80202 ASSAY OF VANCOMYCIN: CPT | Mod: 91 | Performed by: ORTHOPAEDIC SURGERY

## 2023-05-25 PROCEDURE — 63600175 PHARM REV CODE 636 W HCPCS

## 2023-05-25 PROCEDURE — 63600175 PHARM REV CODE 636 W HCPCS: Performed by: STUDENT IN AN ORGANIZED HEALTH CARE EDUCATION/TRAINING PROGRAM

## 2023-05-25 PROCEDURE — 99223 PR INITIAL HOSPITAL CARE,LEVL III: ICD-10-PCS | Mod: ,,, | Performed by: INTERNAL MEDICINE

## 2023-05-25 PROCEDURE — 11000001 HC ACUTE MED/SURG PRIVATE ROOM

## 2023-05-25 PROCEDURE — 25000003 PHARM REV CODE 250: Performed by: STUDENT IN AN ORGANIZED HEALTH CARE EDUCATION/TRAINING PROGRAM

## 2023-05-25 PROCEDURE — 25000003 PHARM REV CODE 250: Performed by: ORTHOPAEDIC SURGERY

## 2023-05-25 PROCEDURE — 63600175 PHARM REV CODE 636 W HCPCS: Performed by: ORTHOPAEDIC SURGERY

## 2023-05-25 PROCEDURE — 80202 ASSAY OF VANCOMYCIN: CPT | Performed by: ORTHOPAEDIC SURGERY

## 2023-05-25 PROCEDURE — 36415 COLL VENOUS BLD VENIPUNCTURE: CPT | Performed by: ORTHOPAEDIC SURGERY

## 2023-05-25 RX ORDER — PROCHLORPERAZINE EDISYLATE 5 MG/ML
2.5 INJECTION INTRAMUSCULAR; INTRAVENOUS EVERY 6 HOURS PRN
Status: DISCONTINUED | OUTPATIENT
Start: 2023-05-25 | End: 2023-05-27 | Stop reason: HOSPADM

## 2023-05-25 RX ADMIN — SODIUM CHLORIDE, POTASSIUM CHLORIDE, SODIUM LACTATE AND CALCIUM CHLORIDE 500 ML: 600; 310; 30; 20 INJECTION, SOLUTION INTRAVENOUS at 08:05

## 2023-05-25 RX ADMIN — ACETAMINOPHEN 650 MG: 325 TABLET ORAL at 02:05

## 2023-05-25 RX ADMIN — PROCHLORPERAZINE EDISYLATE 2.5 MG: 5 INJECTION INTRAMUSCULAR; INTRAVENOUS at 09:05

## 2023-05-25 RX ADMIN — ACETAMINOPHEN 650 MG: 325 TABLET ORAL at 07:05

## 2023-05-25 RX ADMIN — CEFTRIAXONE 1 G: 1 INJECTION, POWDER, FOR SOLUTION INTRAMUSCULAR; INTRAVENOUS at 10:05

## 2023-05-25 RX ADMIN — CELECOXIB 100 MG: 100 CAPSULE ORAL at 01:05

## 2023-05-25 RX ADMIN — OXYCODONE HYDROCHLORIDE 10 MG: 10 TABLET ORAL at 01:05

## 2023-05-25 RX ADMIN — VANCOMYCIN HYDROCHLORIDE 750 MG: 750 INJECTION, POWDER, LYOPHILIZED, FOR SOLUTION INTRAVENOUS at 09:05

## 2023-05-25 RX ADMIN — ACETAMINOPHEN 650 MG: 325 TABLET ORAL at 09:05

## 2023-05-25 RX ADMIN — ONDANSETRON 8 MG: 8 TABLET, ORALLY DISINTEGRATING ORAL at 06:05

## 2023-05-25 RX ADMIN — ACETAMINOPHEN 650 MG: 325 TABLET ORAL at 01:05

## 2023-05-25 NOTE — NURSING
Paged Dr Dewitt's team, patient complaining of severe headache 9-10, nauseated, vomited x1, with dizziness states feels drunk, zofran SL given, tylenol given early, attempted aromatherapy with lavender oil but made nausea worse.  Patient is asking for IVF.      745 spoke with Dr Yash lott for LR Bolus 500ml    0931 patient still nauseated and vomiting, every time he moves states nausea comes on and he vomits, headache slightly better at 6, messaged Dr Rodriguez with update    0940am Dr Rodriguez messaged back will place orders also encouraged to educate patient on narcotic pain medications could cause these symptoms as well. Discussed with patient utilizing ibuprofen and tylenol for pain patient verbalized understanding

## 2023-05-25 NOTE — PROGRESS NOTES
Vital signs stable. Afebrile. Alert, oriented and following commands. Pain and nausea controlled with PRN meds. Dressing remains CDI. POC reviewed with pt and wife at bedside.

## 2023-05-25 NOTE — PROGRESS NOTES
"[facility]  Orthopedics  Progress Note    Patient Name: Srinivas Snyder Jr.  MRN: [id number]  Admission Date: 5/24/2023  Hospital Length of Stay: 1 days  Attending Provider: Dontae Dewitt MD  Primary Care Provider: Amor Reeder DO  Follow-up For: Procedure(s) (LRB):  ARTHROSCOPY, WRIST, WITH DEBRIDEMENT (Left)    Post-Operative Day: 1 Day Post-Op  Subjective:     Principal Problem:Septic arthritis of wrist, left    Principal Orthopedic Problem: same as above s/p L wrist arthroscopic I&D 5/24    Interval History: NAEO. VSS. Pain much improved per patient. No other complaints. CRP 63.2 yesterday    Review of patient's allergies indicates:  No Known Allergies    Current Facility-Administered Medications   Medication    acetaminophen tablet 650 mg    cefTRIAXone (ROCEPHIN) 1 g in dextrose 5 % in water (D5W) 5 % 50 mL IVPB (MB+)    celecoxib capsule 100 mg    fentaNYL 50 mcg/mL injection 50 mcg    LIDOcaine (PF) 10 mg/ml (1%) injection 10 mg    melatonin tablet 6 mg    ondansetron disintegrating tablet 8 mg    oxyCODONE immediate release tablet 5 mg    oxyCODONE immediate release tablet Tab 10 mg    sodium chloride 0.9% flush 10 mL    vancomycin - pharmacy to dose     Objective:     Vital Signs (Most Recent):  Temp: 97.7 °F (36.5 °C) (05/25/23 0113)  Pulse: (!) 58 (05/25/23 0113)  Resp: 16 (05/25/23 0152)  BP: (!) 102/50 (05/25/23 0113)  SpO2: 98 % (05/25/23 0113) Vital Signs (24h Range):  Temp:  [96.3 °F (35.7 °C)-98.2 °F (36.8 °C)] 97.7 °F (36.5 °C)  Pulse:  [55-86] 58  Resp:  [13-19] 16  SpO2:  [95 %-100 %] 98 %  BP: (102-144)/(50-85) 102/50     Weight: 71.7 kg (158 lb)  Height: 5' 8" (172.7 cm)  Body mass index is 24.02 kg/m².      Intake/Output Summary (Last 24 hours) at 5/25/2023 0602  Last data filed at 5/24/2023 1939  Gross per 24 hour   Intake 700 ml   Output --   Net 700 ml        Ortho/SPM Exam  GEN: NAD  Pulm: unlabored respirations, equal bilat chest rise  CV: Extremities " WWP    Dressing/splint CDI  Painless ROM of fingers  SILT     Significant Labs: All pertinent labs within the past 24 hours have been reviewed.    Significant Imaging: I have reviewed all pertinent imaging results/findings.    Assessment/Plan:     * Septic arthritis of wrist, left  Dong Block is 48yo M with septic arthritis of the left wrist s/p L wrist arthroscopic I&D 5/24    - Remove splint tomorrow to assess wrist  - NPO at midnight as precaution  - ID consult placed, fu recs  - Continue broad spectrum IV abx   - NWB L wrist   - FU intraop cultures, preop cultures with staph aureus          Jeyson Rodriguez MD  Orthopedics  [facility] - Surgery

## 2023-05-25 NOTE — ANESTHESIA POSTPROCEDURE EVALUATION
Anesthesia Post Evaluation    Patient: Srinivas Snyder JrSmita    Procedure(s) Performed: Procedure(s) (LRB):  ARTHROSCOPY, WRIST, WITH DEBRIDEMENT (Left)    Final Anesthesia Type: general      Patient location during evaluation: floor  Patient participation: Yes- Able to Participate  Level of consciousness: awake and alert and oriented  Post-procedure vital signs: reviewed and stable  Pain management: adequate  Airway patency: patent    PONV status at discharge: No PONV  Anesthetic complications: no      Cardiovascular status: blood pressure returned to baseline, hemodynamically stable and stable  Respiratory status: unassisted, room air and spontaneous ventilation  Hydration status: euvolemic  Follow-up not needed.          Vitals Value Taken Time   /50 05/25/23 0113   Temp 36.5 °C (97.7 °F) 05/25/23 0113   Pulse 58 05/25/23 0113   Resp 16 05/25/23 0152   SpO2 98 % 05/25/23 0113         Event Time   Out of Recovery 20:00:00         Pain/Jeniffer Score: Pain Rating Prior to Med Admin: 7 (5/25/2023  2:58 AM)  Pain Rating Post Med Admin: 4 (5/24/2023  8:10 PM)  Jeniffer Score: 10 (5/24/2023  8:00 PM)

## 2023-05-25 NOTE — TRANSFER OF CARE
"Anesthesia Transfer of Care Note    Patient: Srinivas Snyder     Procedure(s) Performed: Procedure(s) (LRB):  ARTHROSCOPY, WRIST, WITH DEBRIDEMENT (Left)    Patient location: PACU    Anesthesia Type: general    Transport from OR: Transported from OR on 6-10 L/min O2 by face mask with adequate spontaneous ventilation    Post pain: adequate analgesia    Post assessment: no apparent anesthetic complications    Post vital signs: stable    Level of consciousness: responds to stimulation    Nausea/Vomiting: no nausea/vomiting    Complications: none    Transfer of care protocol was followed      Last vitals:   Visit Vitals  /77 (BP Location: Right arm, Patient Position: Lying)   Pulse (!) 55   Temp 36.8 °C (98.2 °F) (Oral)   Resp 19   Ht 5' 8" (1.727 m)   Wt 71.7 kg (158 lb)   SpO2 100%   BMI 24.02 kg/m²     "

## 2023-05-25 NOTE — HPI
Mr. Snyder is a 47 year old male with hypothyroid disease and HLD who presented to the ED on 5/22 for left arm pain x 3 days. Pain was described as throbbing and extending from his wrist and fingertips to the elbow. Reports similar episode last year that was diagnosed as tendonitis. UA 7.8 and CRP 12.4. There was low suspicion for septic joint given lack of erythema and leukocytosis so patient was discharged from the ED after receiving morphine w/ next day orthopedic surgery follow up. Joint fluid analysis from 5/23 showed WBC [id number] w/ 72% segs and culture (+) staph aureus. Patient returned to the ED for management of septic arthritis. CRP 63.2. Started on Rocephin and underwent I&D on 5/24. ID consulted for septic arthritis.

## 2023-05-25 NOTE — PLAN OF CARE
Problem: Adult Inpatient Plan of Care  Goal: Plan of Care Review  Outcome: Ongoing, Progressing  Goal: Patient-Specific Goal (Individualized)  Outcome: Ongoing, Progressing  Goal: Absence of Hospital-Acquired Illness or Injury  Outcome: Ongoing, Progressing  Goal: Optimal Comfort and Wellbeing  Outcome: Ongoing, Not Progressing     Problem: Bleeding (Surgery Nonspecified)  Goal: Absence of Bleeding  Outcome: Ongoing, Progressing     Problem: Bowel Motility Impaired (Surgery Nonspecified)  Goal: Effective Bowel Elimination  Outcome: Ongoing, Not Progressing     Problem: Fluid and Electrolyte Imbalance (Surgery Nonspecified)  Goal: Fluid and Electrolyte Balance  Outcome: Ongoing, Progressing     Problem: Infection (Surgery Nonspecified)  Goal: Absence of Infection Signs and Symptoms  Outcome: Ongoing, Progressing     Problem: Ongoing Anesthesia Effects (Surgery Nonspecified)  Goal: Anesthesia/Sedation Recovery  Outcome: Ongoing, Progressing     Problem: Pain (Surgery Nonspecified)  Goal: Acceptable Pain Control  Outcome: Ongoing, Not Progressing     Problem: Postoperative Nausea and Vomiting (Surgery Nonspecified)  Goal: Nausea and Vomiting Relief  Outcome: Ongoing, Not Progressing     Problem: Postoperative Urinary Retention (Surgery Nonspecified)  Goal: Effective Urinary Elimination  Outcome: Ongoing, Progressing     Problem: Respiratory Compromise (Surgery Nonspecified)  Goal: Effective Oxygenation and Ventilation  Outcome: Ongoing, Progressing

## 2023-05-25 NOTE — SUBJECTIVE & OBJECTIVE
Past Medical History:   Diagnosis Date    Anxiety     Hyperlipidemia     Hypothyroidism        Past Surgical History:   Procedure Laterality Date    ARTHROSCOPIC DEBRIDEMENT OF WRIST Left 5/24/2023    Procedure: ARTHROSCOPY, WRIST, WITH DEBRIDEMENT;  Surgeon: Dontae Dewitt MD;  Location: [facility] OR [address] [place] [address];  Service: Orthopedics;  Laterality: Left;       Review of patient's allergies indicates:  No Known Allergies    Medications:  Medications Prior to Admission   Medication Sig    atorvastatin (LIPITOR) 20 MG tablet TAKE 1 TABLET(20 MG) BY MOUTH EVERY DAY    levothyroxine (SYNTHROID) 137 MCG Tab tablet Take 1 tablet (137 mcg total) by mouth before breakfast.    lisdexamfetamine (VYVANSE) 30 MG capsule Take 1 capsule (30 mg total) by mouth every morning.    HYDROcodone-acetaminophen (NORCO) 5-325 mg per tablet Take 1 tablet by mouth every 6 (six) hours as needed for Pain.    ibuprofen (ADVIL,MOTRIN) 600 MG tablet Take 1 tablet (600 mg total) by mouth every 6 (six) hours as needed for Pain.    indomethacin (INDOCIN) 50 MG capsule Take 1 capsule (50 mg total) by mouth 3 (three) times daily.    lisdexamfetamine (VYVANSE) 30 MG capsule Take 1 capsule (30 mg total) by mouth every morning.    lisdexamfetamine (VYVANSE) 30 MG capsule Take 1 capsule (30 mg total) by mouth every morning.    methylPREDNISolone (MEDROL DOSEPACK) 4 mg tablet use as directed     Antibiotics (From admission, onward)      Start     Stop Route Frequency Ordered    05/26/23 1024  cefTRIAXone (ROCEPHIN) 2 g in dextrose 5 % in water (D5W) 5 % 50 mL IVPB (MB+)         -- IV Every 24 hours (non-standard times) 05/25/23 1304    05/24/23 1702  vancomycin - pharmacy to dose  (vancomycin IVPB)        See Hyperspace for full Linked Orders Report.    -- IV pharmacy to manage frequency 05/24/23 1602          Antifungals (From admission, onward)      None          Antivirals (From admission, onward)      None             Immunization History   Administered  Date(s) Administered    COVID-19, MRNA, LN-S, PF (Pfizer) (Gray Cap) 05/10/2022    COVID-19, MRNA, LN-S, PF (Pfizer) (Purple Cap) 04/29/2021, 05/20/2021, 05/10/2022    Tdap 08/04/2015       Family History       Problem Relation (Age of Onset)    Hyperlipidemia Mother, Father          Social History     Socioeconomic History    Marital status:     Number of children: 1   Occupational History    Occupation: IT support   Tobacco Use    Smoking status: Never    Smokeless tobacco: Never   Substance and Sexual Activity    Alcohol use: Yes     Alcohol/week: 0.0 standard drinks     Comment: Social     Drug use: No    Sexual activity: Yes     Partners: Female     Social Determinants of Health     Financial Resource Strain: Low Risk     Difficulty of Paying Living Expenses: Not hard at all   Food Insecurity: No Food Insecurity    Worried About Running Out of Food in the Last Year: Never true    Ran Out of Food in the Last Year: Never true   Transportation Needs: No Transportation Needs    Lack of Transportation (Medical): No    Lack of Transportation (Non-Medical): No   Physical Activity: Insufficiently Active    Days of Exercise per Week: 2 days    Minutes of Exercise per Session: 30 min   Stress: No Stress Concern Present    Feeling of Stress : Only a little   Social Connections: Unknown    Frequency of Communication with Friends and Family: Once a week    Frequency of Social Gatherings with Friends and Family: Once a week    Active Member of Clubs or Organizations: No    Attends Club or Organization Meetings: Never    Marital Status:    Housing Stability: Low Risk     Unable to Pay for Housing in the Last Year: No    Number of Places Lived in the Last Year: 1    Unstable Housing in the Last Year: No     Review of Systems   Constitutional:  Positive for activity change. Negative for chills and fatigue.   HENT:  Negative for congestion and sinus pressure.    Respiratory:  Negative for cough and shortness of  breath.    Cardiovascular:  Negative for chest pain.   Gastrointestinal:  Positive for nausea. Negative for abdominal distention, abdominal pain and vomiting.   Musculoskeletal:  Positive for joint swelling. Negative for arthralgias.   Skin:  Positive for color change. Negative for rash and wound.   Neurological:  Negative for dizziness and light-headedness.   Psychiatric/Behavioral:  Negative for agitation and behavioral problems.    Objective:     Vital Signs (Most Recent):  Temp: 97.2 °F (36.2 °C) (05/25/23 0738)  Pulse: 62 (05/25/23 0738)  Resp: 16 (05/25/23 0738)  BP: (!) 110/56 (05/25/23 0738)  SpO2: 100 % (05/25/23 0738) Vital Signs (24h Range):  Temp:  [96.3 °F (35.7 °C)-98.2 °F (36.8 °C)] 97.2 °F (36.2 °C)  Pulse:  [55-86] 62  Resp:  [13-19] 16  SpO2:  [95 %-100 %] 100 %  BP: (102-144)/(50-85) 110/56     Weight: 71.7 kg (158 lb)  Body mass index is 24.02 kg/m².    Estimated Creatinine Clearance: 64.5 mL/min (based on SCr of 1.4 mg/dL).     Physical Exam  Constitutional:       General: He is not in acute distress.     Appearance: Normal appearance. He is not ill-appearing.   HENT:      Head: Normocephalic and atraumatic.      Mouth/Throat:      Mouth: Mucous membranes are moist.      Pharynx: Oropharynx is clear.   Eyes:      Extraocular Movements: Extraocular movements intact.      Conjunctiva/sclera: Conjunctivae normal.   Cardiovascular:      Rate and Rhythm: Normal rate and regular rhythm.   Pulmonary:      Effort: Pulmonary effort is normal.      Breath sounds: Normal breath sounds.   Abdominal:      General: Abdomen is flat.      Palpations: Abdomen is soft.      Tenderness: There is no abdominal tenderness.   Musculoskeletal:         General: Normal range of motion.      Right lower leg: No edema.      Left lower leg: No edema.      Comments: Left wrist in bandage   Skin:     General: Skin is warm.   Neurological:      Mental Status: He is alert.        Significant Labs: CBC: No results for input(s):  WBC, HGB, HCT, PLT in the last 48 hours.  CMP: No results for input(s): NA, K, CL, CO2, GLU, BUN, CREATININE, CALCIUM, PROT, ALBUMIN, BILITOT, ALKPHOS, AST, ALT, ANIONGAP, EGFRNONAA in the last 48 hours.    Invalid input(s): ESTGFAFRICA  Wound Culture:   Recent Labs   Lab 05/23/23  1438   LABAERO STAPHYLOCOCCUS AUREUS  Rare  Susceptibility pending  *       Significant Imaging: I have reviewed all pertinent imaging results/findings within the past 24 hours.

## 2023-05-25 NOTE — NURSING TRANSFER
Nursing Transfer Note      5/24/2023     Reason patient is being transferred: per md order    Transfer To: 510    Transfer via stretcher    Transfer with n/a    Transported by pct    Medicines sent: n/a    Any special needs or follow-up needed: n/a    Chart send with patient: Yes    Notified: spouse

## 2023-05-25 NOTE — OP NOTE
DATE OF PROCEDURE: 05/24/2023     SERVICE:  Orthopedic Surgery.     SURGEON:  Dontae Dewitt M.D.     FIRST ASSISTANT: MD MRAY Sanchez     PREOPERATIVE DIAGNOSIS:    Left wrist septic arthritis     POSTOPERATIVE DIAGNOSIS:    1) Left wrist septic arthritis     PROCEDURE(S) PERFORMED:    Left wrist arthroscopy with systematic evaluation of the radiocarpal and midcarpal joints/spaces and thorough irrigation and debridement  Partial synovectomy left wrist     ESTIMATED BLOOD LOSS:   2 mL.     IMPLANTS:   none     COMPLICATIONS:  none     PACKS AND DRAINS:  none     PATHOLOGIC SPECIMENS: none     ANESTHESIA:  General     CONDITION:  Stable.     Indications for Procedure:   The patient is a 47M who presented to clinic with left wrist pain. The left radiocarpal joint was aspirated and aspirate results were consistent with septic arthritis. The cultures grew staph aureus. The patient was admitted to my service and recommended for left wrist arthroscopic irrigation and debridement for treatment of septic arthritis.     The risks, benefits and alternatives to surgery were discussed with the patient in detail.  Specific risks include but are not limited to bleeding, continued infection, vessel and/or nerve damage, pain, numbness, tingling, complex regional pain syndrome, compartment syndrome, failure to return to pre-injury and/or preoperative functional status, scar sensitivity, delayed healing, inability to return to work, pulley injury, tendon injury, bowstringing, partial and/or incomplete relief of symptoms, weakness, persistence of and/or worsening of symptoms, surgical failure, osteomyelitis, amputation, loss of function, stiffness, functional debility, dysfunction, decreased  strength, need for prolonged postoperative rehabilitation, need for further surgery, deep venous thrombosis, pulmonary embolism, arthritis and death.  The patient states an understanding and wishes to proceed with surgery.   All questions were  answered.  No guarantees were implied or stated.  Written informed consent was obtained.     Procedure in detail:     On the date of the operative intervention the patient was evaluated in the preoperative holding area. The operative wrist was marked and the patient was taken to the operating room where general anesthesia was introduced. The left upper extremity was placed on hand table and was prepped and draped in the usual sterile fashion.  Time-out was taken confirm I will present to confirm the correct patient site procedure and administration of preoperative antibiotics.  All were in agreement so I proceeded.  Esmarch was utilized to exsanguinate the left upper extremity and then tourniquet was insufflated where was non sterilely located on the left upper arm to 250 mm of mercury.  I then placed the patient's left upper extremity within the [facility] arc tower in the usual fashion. Great care was taken to protect all bony prominences with blue towels.  Once this was assembled and the left upper extremity was positioned in the arc tower, 10 lb of traction were placed. At this time I then used a marking pen to draw out all landmarks about the dorsal left wrist. At this time a sterile 18 gauge needle was utilized to insufflate the wrist with 10 cc of saline at the 3 4 portal site.  This was done atraumatically without difficulty.  At this time an 11 blade was utilized for a small longitudinal skin incision at the 3 4 portal site. A hemostat was then utilized to enter the wrist joint and then the blunt trocar was placed in the 3 4 portal. The trocar was removed and an arthroscope was introduced.  This was done without any trauma to the wrist. Vision was turned towards the ulnar compartment at which point an 18 gauge needle was placed in the 4-5 portal under direct arthroscopic visualization.  This was followed by a small nick in the skin with an 11 blade and then a hemostat to enter the portal into the wrist joint.   Once this had been done an arthroscopic shaver was placed in the working portal.      I then began my systematic evaluation of the radiocarpal joint. There was diffuse synovitis which was debrided with a shaver and cautery. Articular cartilage at the radioscaphoid and radiolunate joints had some mild fraying which was debrided with a shaver. The volar wrist extrinsic ligaments were frayed but intact and these were debrided. Visualization was then turned ulnarly for evaluation of the TFCC.  This was intact and stable to probing. There was synovitis in the ulnar carpal joint which was debrided for a synovectomy with an arthroscopic shaver.  Having completed my ulnar-sided work I turn my attention towards the scapholunate interval from the radiocarpal joint.  The scapholunate ligament was intact.  I then irrigated the radiocarpal joint with several liters of fluid.     At this time I then withdrew the arthroscope and the arthroscopic probe and turned my attention towards entering the midcarpal joint. Radial and ulnar midcarpal portals were created in the usual sterile fashion. Arthroscopic shaver and arthroscope were then inserted here for visualization and assessment of the scapholunate and lunatotriquetral joints. There was no significant scapholunate injury. Debridement was performed of midcarpal synovitis and several liters were used to irrigate.      At this time I had completed my evaluation and procedures. The arthroscopic instruments were withdrawn from the wrist as was the insufflation saline. The 4 portal sites were then closed with figure-of-eight 4 O nylon suture and then sterile dressings were applied consisting of Xeroform 4 x 4 gauze and Ace wrap and then a volar slab splint over the top of the dressing. The patient was then awakened from anesthesia and transferred to PACU in stable condition.         Postoperative plan for the patient:      47M with left wrist septic arthritis sp left wrist  arthroscopic I&D 5/24/23     -Continue inpatient treatment  -ID consult  -Dressings/splint down of POD2 for wound check  -IV antibiotics per ID recommendations  -Suture removal in 2 weeks

## 2023-05-25 NOTE — SUBJECTIVE & OBJECTIVE
"Principal Problem:Septic arthritis of wrist, left    Principal Orthopedic Problem: same as above s/p L wrist arthroscopic I&D 5/24    Interval History: NAEO. VSS. Pain much improved per patient. No other complaints. CRP 63.2 yesterday    Review of patient's allergies indicates:  No Known Allergies    Current Facility-Administered Medications   Medication    acetaminophen tablet 650 mg    cefTRIAXone (ROCEPHIN) 1 g in dextrose 5 % in water (D5W) 5 % 50 mL IVPB (MB+)    celecoxib capsule 100 mg    fentaNYL 50 mcg/mL injection 50 mcg    LIDOcaine (PF) 10 mg/ml (1%) injection 10 mg    melatonin tablet 6 mg    ondansetron disintegrating tablet 8 mg    oxyCODONE immediate release tablet 5 mg    oxyCODONE immediate release tablet Tab 10 mg    sodium chloride 0.9% flush 10 mL    vancomycin - pharmacy to dose     Objective:     Vital Signs (Most Recent):  Temp: 97.7 °F (36.5 °C) (05/25/23 0113)  Pulse: (!) 58 (05/25/23 0113)  Resp: 16 (05/25/23 0152)  BP: (!) 102/50 (05/25/23 0113)  SpO2: 98 % (05/25/23 0113) Vital Signs (24h Range):  Temp:  [96.3 °F (35.7 °C)-98.2 °F (36.8 °C)] 97.7 °F (36.5 °C)  Pulse:  [55-86] 58  Resp:  [13-19] 16  SpO2:  [95 %-100 %] 98 %  BP: (102-144)/(50-85) 102/50     Weight: 71.7 kg (158 lb)  Height: 5' 8" (172.7 cm)  Body mass index is 24.02 kg/m².      Intake/Output Summary (Last 24 hours) at 5/25/2023 0602  Last data filed at 5/24/2023 1939  Gross per 24 hour   Intake 700 ml   Output --   Net 700 ml        Ortho/SPM Exam  GEN: NAD  Pulm: unlabored respirations, equal bilat chest rise  CV: Extremities WWP    Dressing/splint CDI  Painless ROM of fingers  SILT     Significant Labs: All pertinent labs within the past 24 hours have been reviewed.    Significant Imaging: I have reviewed all pertinent imaging results/findings.  "

## 2023-05-25 NOTE — ASSESSMENT & PLAN NOTE
47 year old male with hypothyroid disease and HLD presenting w/ left arm pain x 3 days. Joint fluid analysis from 5/23 showed WBC [id number] w/ 72% segs and culture (+) staph aureus. Patient returned to the ED for management of septic arthritis. CRP 63.2. Started on Rocephin and underwent I&D on 5/24. ID consulted for septic arthritis. Patient denies HA, vision changes, fevers, chills, rash, open wounds, diarrhea, vomiting, dysuria. Denies IVDU. No history of immunosuppression. No recent surgeries.     Recommendations:  - Unclear cause of left wrist septic arthritis. Continue vancomycin and ceftriaxone pending staph aureus susceptibilities. Anticipate need for 4 weeks of IV abx.

## 2023-05-25 NOTE — CONSULTS
[facility]  Infectious Disease  Consult Note    Patient Name: Srinivas Snyder Jr.  MRN: [id number]  Admission Date: 5/24/2023  Hospital Length of Stay: 1 days  Attending Physician: Dontae Dewitt MD  Primary Care Provider: Amor Reeder DO     Isolation Status: No active isolations    Patient information was obtained from patient and ER records.      Inpatient consult to Infectious Diseases  Consult performed by: Debbie Lane MD  Consult ordered by: Dong Sanchez MD        Assessment/Plan:     ID  * Septic arthritis of wrist, left  47 year old male with hypothyroid disease and HLD presenting w/ left arm pain x 3 days. Joint fluid analysis from 5/23 showed WBC 992941 w/ 72% segs and culture (+) staph aureus. Patient returned to the ED for management of septic arthritis. CRP 63.2. Started on Rocephin and underwent I&D on 5/24. ID consulted for septic arthritis. Patient denies HA, vision changes, fevers, chills, rash, open wounds, diarrhea, vomiting, dysuria. Denies IVDU. No history of immunosuppression. No recent surgeries.     Recommendations:  - Unclear cause of left wrist septic arthritis. Continue vancomycin and ceftriaxone pending staph aureus susceptibilities. Anticipate need for 4 weeks of IV abx.          Thank you for your consult. I will follow-up with patient. Please contact us if you have any additional questions.    Debbie Lane MD  Infectious Disease  [facility]    Subjective:     Principal Problem: Septic arthritis of wrist, left    HPI: Mr. Snyder is a 47 year old male with hypothyroid disease and HLD who presented to the ED on 5/22 for left arm pain x 3 days. Pain was described as throbbing and extending from his wrist and fingertips to the elbow. Reports similar episode last year that was diagnosed as tendonitis. UA 7.8 and CRP 12.4. There was low suspicion for septic joint given lack of erythema and leukocytosis so patient was discharged from the ED after  receiving morphine w/ next day orthopedic surgery follow up. Joint fluid analysis from 5/23 showed WBC [id number] w/ 72% segs and culture (+) staph aureus. Patient returned to the ED for management of septic arthritis. CRP 63.2. Started on Rocephin and underwent I&D on 5/24. ID consulted for septic arthritis.       Past Medical History:   Diagnosis Date    Anxiety     Hyperlipidemia     Hypothyroidism        Past Surgical History:   Procedure Laterality Date    ARTHROSCOPIC DEBRIDEMENT OF WRIST Left 5/24/2023    Procedure: ARTHROSCOPY, WRIST, WITH DEBRIDEMENT;  Surgeon: Dontae Dewitt MD;  Location: [facility] OR [address] [place] [address];  Service: Orthopedics;  Laterality: Left;       Review of patient's allergies indicates:  No Known Allergies    Medications:  Medications Prior to Admission   Medication Sig    atorvastatin (LIPITOR) 20 MG tablet TAKE 1 TABLET(20 MG) BY MOUTH EVERY DAY    levothyroxine (SYNTHROID) 137 MCG Tab tablet Take 1 tablet (137 mcg total) by mouth before breakfast.    lisdexamfetamine (VYVANSE) 30 MG capsule Take 1 capsule (30 mg total) by mouth every morning.    HYDROcodone-acetaminophen (NORCO) 5-325 mg per tablet Take 1 tablet by mouth every 6 (six) hours as needed for Pain.    ibuprofen (ADVIL,MOTRIN) 600 MG tablet Take 1 tablet (600 mg total) by mouth every 6 (six) hours as needed for Pain.    indomethacin (INDOCIN) 50 MG capsule Take 1 capsule (50 mg total) by mouth 3 (three) times daily.    lisdexamfetamine (VYVANSE) 30 MG capsule Take 1 capsule (30 mg total) by mouth every morning.    lisdexamfetamine (VYVANSE) 30 MG capsule Take 1 capsule (30 mg total) by mouth every morning.    methylPREDNISolone (MEDROL DOSEPACK) 4 mg tablet use as directed     Antibiotics (From admission, onward)      Start     Stop Route Frequency Ordered    05/26/23 1024  cefTRIAXone (ROCEPHIN) 2 g in dextrose 5 % in water (D5W) 5 % 50 mL IVPB (MB+)         -- IV Every 24 hours (non-standard times) 05/25/23 1304    05/24/23  1702  vancomycin - pharmacy to dose  (vancomycin IVPB)        See Hyperspace for full Linked Orders Report.    -- IV pharmacy to manage frequency 05/24/23 1602          Antifungals (From admission, onward)      None          Antivirals (From admission, onward)      None             Immunization History   Administered Date(s) Administered    COVID-19, MRNA, LN-S, PF (Pfizer) (Gray Cap) 05/10/2022    COVID-19, MRNA, LN-S, PF (Pfizer) (Purple Cap) 04/29/2021, 05/20/2021, 05/10/2022    Tdap 08/04/2015       Family History       Problem Relation (Age of Onset)    Hyperlipidemia Mother, Father          Social History     Socioeconomic History    Marital status:     Number of children: 1   Occupational History    Occupation: IT support   Tobacco Use    Smoking status: Never    Smokeless tobacco: Never   Substance and Sexual Activity    Alcohol use: Yes     Alcohol/week: 0.0 standard drinks     Comment: Social     Drug use: No    Sexual activity: Yes     Partners: Female     Social Determinants of Health     Financial Resource Strain: Low Risk     Difficulty of Paying Living Expenses: Not hard at all   Food Insecurity: No Food Insecurity    Worried About Running Out of Food in the Last Year: Never true    Ran Out of Food in the Last Year: Never true   Transportation Needs: No Transportation Needs    Lack of Transportation (Medical): No    Lack of Transportation (Non-Medical): No   Physical Activity: Insufficiently Active    Days of Exercise per Week: 2 days    Minutes of Exercise per Session: 30 min   Stress: No Stress Concern Present    Feeling of Stress : Only a little   Social Connections: Unknown    Frequency of Communication with Friends and Family: Once a week    Frequency of Social Gatherings with Friends and Family: Once a week    Active Member of Clubs or Organizations: No    Attends Club or Organization Meetings: Never    Marital Status:    Housing Stability: Low Risk      Unable to Pay for Housing in the Last Year: No    Number of Places Lived in the Last Year: 1    Unstable Housing in the Last Year: No     Review of Systems   Constitutional:  Positive for activity change. Negative for chills and fatigue.   HENT:  Negative for congestion and sinus pressure.    Respiratory:  Negative for cough and shortness of breath.    Cardiovascular:  Negative for chest pain.   Gastrointestinal:  Positive for nausea. Negative for abdominal distention, abdominal pain and vomiting.   Musculoskeletal:  Positive for joint swelling. Negative for arthralgias.   Skin:  Positive for color change. Negative for rash and wound.   Neurological:  Negative for dizziness and light-headedness.   Psychiatric/Behavioral:  Negative for agitation and behavioral problems.    Objective:     Vital Signs (Most Recent):  Temp: 97.2 °F (36.2 °C) (05/25/23 0738)  Pulse: 62 (05/25/23 0738)  Resp: 16 (05/25/23 0738)  BP: (!) 110/56 (05/25/23 0738)  SpO2: 100 % (05/25/23 0738) Vital Signs (24h Range):  Temp:  [96.3 °F (35.7 °C)-98.2 °F (36.8 °C)] 97.2 °F (36.2 °C)  Pulse:  [55-86] 62  Resp:  [13-19] 16  SpO2:  [95 %-100 %] 100 %  BP: (102-144)/(50-85) 110/56     Weight: 71.7 kg (158 lb)  Body mass index is 24.02 kg/m².    Estimated Creatinine Clearance: 64.5 mL/min (based on SCr of 1.4 mg/dL).     Physical Exam  Constitutional:       General: He is not in acute distress.     Appearance: Normal appearance. He is not ill-appearing.   HENT:      Head: Normocephalic and atraumatic.      Mouth/Throat:      Mouth: Mucous membranes are moist.      Pharynx: Oropharynx is clear.   Eyes:      Extraocular Movements: Extraocular movements intact.      Conjunctiva/sclera: Conjunctivae normal.   Cardiovascular:      Rate and Rhythm: Normal rate and regular rhythm.   Pulmonary:      Effort: Pulmonary effort is normal.      Breath sounds: Normal breath sounds.   Abdominal:      General: Abdomen is flat.      Palpations: Abdomen is soft.       Tenderness: There is no abdominal tenderness.   Musculoskeletal:         General: Normal range of motion.      Right lower leg: No edema.      Left lower leg: No edema.      Comments: Left wrist in bandage   Skin:     General: Skin is warm.   Neurological:      Mental Status: He is alert.        Significant Labs: CBC: No results for input(s): WBC, HGB, HCT, PLT in the last 48 hours.  CMP: No results for input(s): NA, K, CL, CO2, GLU, BUN, CREATININE, CALCIUM, PROT, ALBUMIN, BILITOT, ALKPHOS, AST, ALT, ANIONGAP, EGFRNONAA in the last 48 hours.    Invalid input(s): ESTGFAFRICA  Wound Culture:   Recent Labs   Lab 05/23/23  1438   LABAERO STAPHYLOCOCCUS AUREUS  Rare  Susceptibility pending  *       Significant Imaging: I have reviewed all pertinent imaging results/findings within the past 24 hours.

## 2023-05-25 NOTE — ASSESSMENT & PLAN NOTE
Dong Snyder is 46yo M with septic arthritis of the left wrist s/p L wrist arthroscopic I&D 5/24    - Remove splint tomorrow to assess wrist  - NPO at midnight as precaution  - ID consult placed, fu recs  - Continue broad spectrum IV abx   - NWB L wrist   - FU intraop cultures, preop cultures with staph aureus

## 2023-05-25 NOTE — PLAN OF CARE
[facility] - [facility]  Initial Discharge Assessment       Primary Care Provider: Amor Reeder DO    Admission Diagnosis: Hand injury [S69.90XA]  Septic arthritis of wrist, left [M00.9]  Septic arthritis of wrist, left [M00.9]    Admission Date: 5/24/2023  Expected Discharge Date: 5/27/2023         Payor: BLUE CROSS BLUE SHIELD / Plan: BCBS ALL OUT OF STATE / Product Type: PPO /     Extended Emergency Contact Information  Primary Emergency Contact: Frida Dillon  Address: [address] [place] [address] [place]  Mobile Phone: [phone]  Relation: Spouse  Secondary Emergency Contact: Daisha Snyder   [place]  Mobile Phone: [phone]  Relation: Mother    Discharge Plan A: Home with family         [id number] - [place], [place] - [address] AT [facility] [address]  [address]  [place] [address]  Phone: [phone] Fax: [phone]    [id number] - [place], [place] - [address]  [address]  [place] [address]  Phone: [phone] Fax: [phone]      Initial Assessment (most recent)       Adult Discharge Assessment - 05/25/23 1510          Discharge Assessment    Assessment Type Discharge Planning Assessment     Confirmed/corrected address, phone number and insurance Yes     Confirmed Demographics Correct on Facesheet     Source of Information patient     Does patient/caregiver understand observation status Yes     Communicated RIGOBERTO with patient/caregiver Yes     People in Home spouse     Do you expect to return to your current living situation? Yes     Do you have help at home or someone to help you manage your care at home? Yes     Who are your caregiver(s) and their phone number(s)? José Diggs (Spouse) [phone]     Prior to hospitilization cognitive status: Alert/Oriented     Current cognitive status: Alert/Oriented     Readmission within 30 days? No     Patient currently being  followed by outpatient case management? No     Do you currently have service(s) that help you manage your care at home? No     Do you take prescription medications? Yes     Do you have prescription coverage? Yes     Do you have any problems affording any of your prescribed medications? No     Is the patient taking medications as prescribed? yes     Who is going to help you get home at discharge? Spouse and parents     How do you get to doctors appointments? family or friend will provide     Are you on dialysis? No     Do you take coumadin? No     Discharge Plan A Home with family        Physical Activity    On average, how many days per week do you engage in moderate to strenuous exercise (like a brisk walk)? 0 days     On average, how many minutes do you engage in exercise at this level? 0 min        Financial Resource Strain    How hard is it for you to pay for the very basics like food, housing, medical care, and heating? Not hard at all        Housing Stability    In the last 12 months, was there a time when you were not able to pay the mortgage or rent on time? No     In the last 12 months, how many places have you lived? 1     In the last 12 months, was there a time when you did not have a steady place to sleep or slept in a shelter (including now)? No        Transportation Needs    In the past 12 months, has lack of transportation kept you from medical appointments or from getting medications? No     In the past 12 months, has lack of transportation kept you from meetings, work, or from getting things needed for daily living? No        Food Insecurity    Within the past 12 months, you worried that your food would run out before you got the money to buy more. Never true     Within the past 12 months, the food you bought just didn't last and you didn't have money to get more. Never true        Stress    Do you feel stress - tense, restless, nervous, or anxious, or unable to sleep at night because your mind is  troubled all the time - these days? Not at all        Social Connections    In a typical week, how many times do you talk on the phone with family, friends, or neighbors? More than three times a week     How often do you get together with friends or relatives? More than three times a week     How often do you attend Tenriism or Pentecostalism services? Never     Do you belong to any clubs or organizations such as Tenriism groups, unions, fraternal or athletic groups, or school groups? No     How often do you attend meetings of the clubs or organizations you belong to? Never     Are you , , , , never , or living with a partner?         Alcohol Use    Q1: How often do you have a drink containing alcohol? Monthly or less     Q2: How many drinks containing alcohol do you have on a typical day when you are drinking? 1 or 2     Q3: How often do you have six or more drinks on one occasion? Never                      Spoke with patient and his father at bedside to complete d/c planning assessment. Patient lives with his spouse in a single story home with no steps to enter. Patient is Independent with ADL's. No DME in home. Verified PCP, Pharmacy and health insurance. SW to send referral to [facility] infusion in anticipation that patient may d/c home with IVAB. Patient would report to suite weekly for dressing changes and labs as he is not Home bound. Will continue to follow for d/c needs.      Tesha DELACRUZ  Case Management  [facility]  [phone]

## 2023-05-26 LAB
BACTERIA SPEC AEROBE CULT: ABNORMAL
CREAT SERPL-MCNC: 1.2 MG/DL (ref 0.5–1.4)
CRP SERPL-MCNC: 17.8 MG/L (ref 0–8.2)
EST. GFR  (NO RACE VARIABLE): >60 ML/MIN/1.73 M^2

## 2023-05-26 PROCEDURE — 25000003 PHARM REV CODE 250: Performed by: ORTHOPAEDIC SURGERY

## 2023-05-26 PROCEDURE — 82565 ASSAY OF CREATININE: CPT | Performed by: ORTHOPAEDIC SURGERY

## 2023-05-26 PROCEDURE — 25000003 PHARM REV CODE 250: Performed by: INTERNAL MEDICINE

## 2023-05-26 PROCEDURE — 63600175 PHARM REV CODE 636 W HCPCS: Performed by: INTERNAL MEDICINE

## 2023-05-26 PROCEDURE — 86140 C-REACTIVE PROTEIN: CPT

## 2023-05-26 PROCEDURE — 63600175 PHARM REV CODE 636 W HCPCS: Performed by: ORTHOPAEDIC SURGERY

## 2023-05-26 PROCEDURE — 36415 COLL VENOUS BLD VENIPUNCTURE: CPT

## 2023-05-26 PROCEDURE — 99233 PR SUBSEQUENT HOSPITAL CARE,LEVL III: ICD-10-PCS | Mod: ,,, | Performed by: INTERNAL MEDICINE

## 2023-05-26 PROCEDURE — 11000001 HC ACUTE MED/SURG PRIVATE ROOM

## 2023-05-26 PROCEDURE — 25000003 PHARM REV CODE 250: Performed by: STUDENT IN AN ORGANIZED HEALTH CARE EDUCATION/TRAINING PROGRAM

## 2023-05-26 PROCEDURE — 99233 SBSQ HOSP IP/OBS HIGH 50: CPT | Mod: ,,, | Performed by: INTERNAL MEDICINE

## 2023-05-26 RX ADMIN — ACETAMINOPHEN 650 MG: 325 TABLET ORAL at 08:05

## 2023-05-26 RX ADMIN — CEFTRIAXONE 2 G: 2 INJECTION, POWDER, FOR SOLUTION INTRAMUSCULAR; INTRAVENOUS at 10:05

## 2023-05-26 RX ADMIN — CEFAZOLIN 2 G: 2 INJECTION, POWDER, FOR SOLUTION INTRAMUSCULAR; INTRAVENOUS at 03:05

## 2023-05-26 RX ADMIN — ACETAMINOPHEN 650 MG: 325 TABLET ORAL at 09:05

## 2023-05-26 RX ADMIN — CELECOXIB 100 MG: 100 CAPSULE ORAL at 09:05

## 2023-05-26 RX ADMIN — VANCOMYCIN HYDROCHLORIDE 750 MG: 750 INJECTION, POWDER, LYOPHILIZED, FOR SOLUTION INTRAVENOUS at 09:05

## 2023-05-26 RX ADMIN — CEFAZOLIN 2 G: 2 INJECTION, POWDER, FOR SOLUTION INTRAMUSCULAR; INTRAVENOUS at 10:05

## 2023-05-26 RX ADMIN — ACETAMINOPHEN 650 MG: 325 TABLET ORAL at 02:05

## 2023-05-26 NOTE — SUBJECTIVE & OBJECTIVE
"Principal Problem:Septic arthritis of wrist, left    Principal Orthopedic Problem: same as above s/p L wrist arthroscopic I&D 5/24    Interval History: NAEO. VSS. Pain much improved per patient. No other complaints. CRP  17.8 today, down from 63.2. Splint removed today, wrist without drainage or erythema. Incision clean, dry, intact.    Review of patient's allergies indicates:  No Known Allergies    Current Facility-Administered Medications   Medication    acetaminophen tablet 650 mg    cefTRIAXone (ROCEPHIN) 2 g in dextrose 5 % in water (D5W) 5 % 50 mL IVPB (MB+)    celecoxib capsule 100 mg    fentaNYL 50 mcg/mL injection 50 mcg    LIDOcaine (PF) 10 mg/ml (1%) injection 10 mg    melatonin tablet 6 mg    ondansetron disintegrating tablet 8 mg    oxyCODONE immediate release tablet 5 mg    oxyCODONE immediate release tablet Tab 10 mg    prochlorperazine injection Soln 2.5 mg    sodium chloride 0.9% flush 10 mL    vancomycin - pharmacy to dose    vancomycin 750 mg in dextrose 5 % (D5W) 250 mL IVPB (Vial-Mate)     Objective:     Vital Signs (Most Recent):  Temp: 97.4 °F (36.3 °C) (05/26/23 0430)  Pulse: (!) 52 (05/26/23 0430)  Resp: 16 (05/26/23 0430)  BP: 118/68 (05/26/23 0430)  SpO2: 95 % (05/26/23 0430) Vital Signs (24h Range):  Temp:  [96.4 °F (35.8 °C)-97.9 °F (36.6 °C)] 97.4 °F (36.3 °C)  Pulse:  [52-66] 52  Resp:  [16-20] 16  SpO2:  [95 %-100 %] 95 %  BP: (109-118)/(56-70) 118/68     Weight: 71.7 kg (158 lb)  Height: 5' 8" (172.7 cm)  Body mass index is 24.02 kg/m².      Intake/Output Summary (Last 24 hours) at 5/26/2023 0727  Last data filed at 5/26/2023 0439  Gross per 24 hour   Intake 1478.25 ml   Output 400 ml   Net 1078.25 ml          Ortho/SPM Exam  GEN: NAD  Pulm: unlabored respirations, equal bilat chest rise  CV: Extremities WWP    Incision c/d/I, mild erythema, pain improving  Painless ROM of fingers  SILT     Significant Labs: All pertinent labs within the past 24 hours have been " reviewed.    Significant Imaging: I have reviewed all pertinent imaging results/findings.

## 2023-05-26 NOTE — SUBJECTIVE & OBJECTIVE
Interval History: No acute events overnight. Remains HDS and afebrile. Sxs improved including nausea. Wound cx susceptibilities pending.     Review of Systems   Constitutional:  Positive for activity change. Negative for chills and fatigue.   HENT:  Negative for congestion and sinus pressure.    Respiratory:  Negative for cough and shortness of breath.    Cardiovascular:  Negative for chest pain.   Gastrointestinal:  Positive for nausea. Negative for abdominal distention, abdominal pain and vomiting.   Musculoskeletal:  Positive for joint swelling. Negative for arthralgias.   Skin:  Positive for color change. Negative for rash and wound.   Neurological:  Negative for dizziness and light-headedness.   Psychiatric/Behavioral:  Negative for agitation and behavioral problems.    Objective:     Vital Signs (Most Recent):  Temp: 98.2 °F (36.8 °C) (05/26/23 0747)  Pulse: (!) 56 (05/26/23 0747)  Resp: 17 (05/26/23 0747)  BP: 124/76 (05/26/23 0747)  SpO2: 98 % (05/26/23 0747) Vital Signs (24h Range):  Temp:  [96.4 °F (35.8 °C)-98.2 °F (36.8 °C)] 98.2 °F (36.8 °C)  Pulse:  [52-66] 56  Resp:  [16-20] 17  SpO2:  [95 %-100 %] 98 %  BP: (109-124)/(58-76) 124/76     Weight: 71.7 kg (158 lb)  Body mass index is 24.02 kg/m².    Estimated Creatinine Clearance: 75.2 mL/min (based on SCr of 1.2 mg/dL).     Physical Exam  Constitutional:       General: He is not in acute distress.     Appearance: Normal appearance. He is not ill-appearing.   HENT:      Head: Normocephalic and atraumatic.      Mouth/Throat:      Mouth: Mucous membranes are moist.      Pharynx: Oropharynx is clear.   Eyes:      Extraocular Movements: Extraocular movements intact.      Conjunctiva/sclera: Conjunctivae normal.   Cardiovascular:      Rate and Rhythm: Normal rate and regular rhythm.   Pulmonary:      Effort: Pulmonary effort is normal.      Breath sounds: Normal breath sounds.   Abdominal:      General: Abdomen is flat.      Palpations: Abdomen is soft.       Tenderness: There is no abdominal tenderness.   Musculoskeletal:         General: Normal range of motion.      Right lower leg: No edema.      Left lower leg: No edema.      Comments: Left wrist in bandage   Skin:     General: Skin is warm.   Neurological:      Mental Status: He is alert.        Significant Labs: CBC: No results for input(s): WBC, HGB, HCT, PLT in the last 48 hours.  CMP:   Recent Labs   Lab 05/26/23  0445   CREATININE 1.2     Wound Culture:   Recent Labs   Lab 05/23/23  1438   LABAERO STAPHYLOCOCCUS AUREUS  Rare  Susceptibility pending  *       Significant Imaging: I have reviewed all pertinent imaging results/findings within the past 24 hours.

## 2023-05-26 NOTE — PROGRESS NOTES
Pharmacokinetic Assessment Follow Up: IV Vancomycin    Vancomycin serum concentration assessment(s):    The random level was drawn correctly and can be used to guide therapy at this time. The measurement is below the desired definitive target range of 15 to 20 mcg/mL.    Vancomycin Regimen Plan:    Change regimen to Vancomycin 750 mg IV every 12 hours with next serum trough concentration measured at 1945 prior to 4th dose on 5/26    Drug levels (last 3 results):  Recent Labs   Lab Result Units 05/25/23  1748   Vancomycin, Random ug/mL 5.8       Pharmacy will continue to follow and monitor vancomycin.    Please contact pharmacy at extension [phone] for questions regarding this assessment.    Thank you for the consult,   Eileen Jeffers       Patient brief summary:  Srinivas Snyder Jr. is a 47 y.o. male initiated on antimicrobial therapy with IV Vancomycin for treatment of bone/joint infection      Drug Allergies:   Review of patient's allergies indicates:  No Known Allergies    Actual Body Weight:   71.7 kg    Renal Function:   Estimated Creatinine Clearance: 64.5 mL/min (based on SCr of 1.4 mg/dL).,     Dialysis Method (if applicable):  N/A

## 2023-05-26 NOTE — ASSESSMENT & PLAN NOTE
47 year old male with hypothyroid disease and HLD presenting w/ left arm pain x 3 days. Joint fluid analysis from 5/23 showed WBC 925000 w/ 72% segs and culture (+) staph aureus. Patient returned to the ED for management of septic arthritis. CRP 63.2. Started on Rocephin and underwent I&D on 5/24. ID consulted for septic arthritis. Patient denies HA, vision changes, fevers, chills, rash, open wounds, diarrhea, vomiting, dysuria. Denies IVDU. No history of immunosuppression. No recent surgeries.     Recommendations:  - Unclear cause of left wrist septic arthritis. Continue vancomycin and ceftriaxone pending staph aureus susceptibilities. Anticipate need for 4 weeks of IV abx. Patient will need PICC.

## 2023-05-26 NOTE — NURSING
2345: assisted pt to bathroom, SO on couch, returned pt to bed, rehooked up leg compresses; call light handed to him, reminded him to call me if he has any needs

## 2023-05-26 NOTE — PLAN OF CARE
05/26/23 0856   Post-Acute Status   Post-Acute Authorization IV Infusion   IV Infusion Status Referral(s) sent   Discharge Plan   Discharge Plan A Home with family     Patient anticipated to d/c home with IVAB. Referral sent to [facility] infusion to follow. Cx's remain pending at this time. Will continue to follow.    Tesha DELACRUZ  Case Management  [facility]  [phone]

## 2023-05-26 NOTE — PROGRESS NOTES
VANCOMYCIN DOSING BY PHARMACY DISCONTINUATION NOTE    Srinivas Snyder . is a 47 y.o. male who had been consulted for vancomycin dosing.    The pharmacy consult for vancomycin dosing has been discontinued.     Vancomycin Dosing by Pharmacy Consult will sign-off. Please reconsult if necessary. Thank you for allowing us to participate in this patient's care.     Eileen Jeffers, PharmD  [phone]

## 2023-05-26 NOTE — CARE UPDATE
MSSA identified. Changing to Ancef. See OPAT note below. Thanks, CB    Outpatient Antibiotic Therapy Plan:    Please send referral to [facility].    1) Infection: Septic left wrist (MSSA)    2) Discharge Antibiotics:    Intravenous antibiotics:  Ancef 2 grams IV q 8 hours    3) Therapy Duration:  4 weeks    Estimated end date of IV antibiotics: 06/21/2023    4) Outpatient Weekly Labs:    Order the following labs to be drawn on Mondays:   CBC  CMP   CRP    5) Fax Lab Results to Infectious Diseases Provider: [facility] Fax Number: [phone]    6) Outpatient Infectious Diseases Follow-up    Follow-up appointment will be arranged by the ID clinic and will be found in the patient's appointments tab.    Prior to discharge, please ensure the patient's follow-up has been scheduled.    If there is still no follow-up scheduled prior to discharge, please send an EPIC message to Aurora Malloy in Infectious Diseases.

## 2023-05-26 NOTE — PROGRESS NOTES
"[facility]  Orthopedics  Progress Note    Patient Name: Srinivas Snyder Jr.  MRN: [id number]  Admission Date: 5/24/2023  Hospital Length of Stay: 2 days  Attending Provider: Dontae Dewitt MD  Primary Care Provider: Amor Reeder DO  Follow-up For: Procedure(s) (LRB):  ARTHROSCOPY, WRIST, WITH DEBRIDEMENT (Left)    Post-Operative Day: 2 Days Post-Op  Subjective:     Principal Problem:Septic arthritis of wrist, left    Principal Orthopedic Problem: same as above s/p L wrist arthroscopic I&D 5/24    Interval History: NAEO. VSS. Pain much improved per patient. No other complaints. CRP  17.8 today, down from 63.2. Splint removed today, wrist without drainage or erythema. Incision clean, dry, intact.    Review of patient's allergies indicates:  No Known Allergies    Current Facility-Administered Medications   Medication    acetaminophen tablet 650 mg    cefTRIAXone (ROCEPHIN) 2 g in dextrose 5 % in water (D5W) 5 % 50 mL IVPB (MB+)    celecoxib capsule 100 mg    fentaNYL 50 mcg/mL injection 50 mcg    LIDOcaine (PF) 10 mg/ml (1%) injection 10 mg    melatonin tablet 6 mg    ondansetron disintegrating tablet 8 mg    oxyCODONE immediate release tablet 5 mg    oxyCODONE immediate release tablet Tab 10 mg    prochlorperazine injection Soln 2.5 mg    sodium chloride 0.9% flush 10 mL    vancomycin - pharmacy to dose    vancomycin 750 mg in dextrose 5 % (D5W) 250 mL IVPB (Vial-Mate)     Objective:     Vital Signs (Most Recent):  Temp: 97.4 °F (36.3 °C) (05/26/23 0430)  Pulse: (!) 52 (05/26/23 0430)  Resp: 16 (05/26/23 0430)  BP: 118/68 (05/26/23 0430)  SpO2: 95 % (05/26/23 0430) Vital Signs (24h Range):  Temp:  [96.4 °F (35.8 °C)-97.9 °F (36.6 °C)] 97.4 °F (36.3 °C)  Pulse:  [52-66] 52  Resp:  [16-20] 16  SpO2:  [95 %-100 %] 95 %  BP: (109-118)/(56-70) 118/68     Weight: 71.7 kg (158 lb)  Height: 5' 8" (172.7 cm)  Body mass index is 24.02 kg/m².      Intake/Output Summary (Last 24 hours) at " 5/26/2023 0726  Last data filed at 5/26/2023 0439  Gross per 24 hour   Intake 1478.25 ml   Output 400 ml   Net 1078.25 ml         Ortho/SPM Exam  GEN: NAD  Pulm: unlabored respirations, equal bilat chest rise  CV: Extremities WWP    Incision c/d/I, mild erythema, pain improving  Painless ROM of fingers  SILT     Significant Labs: All pertinent labs within the past 24 hours have been reviewed.    Significant Imaging: I have reviewed all pertinent imaging results/findings.    Assessment/Plan:     * Septic arthritis of wrist, left  Dong Block is 46yo M with septic arthritis of the left wrist s/p L wrist arthroscopic I&D 5/24    - Splint removed today, incision without drainage, soft dressing placed today  - Ok for regular diet  - FU ID final recs  - Continue broad spectrum IV abx   - NWB L wrist   - FU intraop cultures, preop cultures with staph aureus            Jeyson Rodriguez MD  Orthopedics  [facility] - Surgery

## 2023-05-26 NOTE — PROGRESS NOTES
[facility]  Infectious Disease  Progress Note    Patient Name: Srinivas Snyder Jr.  MRN: [id number]  Admission Date: 5/24/2023  Length of Stay: 2 days  Attending Physician: Dontae Dewitt MD  Primary Care Provider: Amor Reeder DO    Isolation Status: No active isolations  Assessment/Plan:      ID  * Septic arthritis of wrist, left  47 year old male with hypothyroid disease and HLD presenting w/ left arm pain x 3 days. Joint fluid analysis from 5/23 showed WBC 201728 w/ 72% segs and culture (+) staph aureus. Patient returned to the ED for management of septic arthritis. CRP 63.2. Started on Rocephin and underwent I&D on 5/24. ID consulted for septic arthritis. Patient denies HA, vision changes, fevers, chills, rash, open wounds, diarrhea, vomiting, dysuria. Denies IVDU. No history of immunosuppression. No recent surgeries.     Recommendations:  - Unclear cause of left wrist septic arthritis. Continue vancomycin and ceftriaxone pending staph aureus susceptibilities. Anticipate need for 4 weeks of IV abx. Patient will need PICC.        Thank you for your consult. I will follow-up with patient. Please contact us if you have any additional questions.    Debbie Lane MD  Infectious Disease  [facility]    Subjective:     Principal Problem:Septic arthritis of wrist, left    HPI: Mr. Snyder is a 47 year old male with hypothyroid disease and HLD who presented to the ED on 5/22 for left arm pain x 3 days. Pain was described as throbbing and extending from his wrist and fingertips to the elbow. Reports similar episode last year that was diagnosed as tendonitis. UA 7.8 and CRP 12.4. There was low suspicion for septic joint given lack of erythema and leukocytosis so patient was discharged from the ED after receiving morphine w/ next day orthopedic surgery follow up. Joint fluid analysis from 5/23 showed WBC [id number] w/ 72% segs and culture (+) staph aureus. Patient returned to the ED for management  of septic arthritis. CRP 63.2. Started on Rocephin and underwent I&D on 5/24. ID consulted for septic arthritis.     Interval History: No acute events overnight. Remains HDS and afebrile. Sxs improved including nausea. Wound cx susceptibilities pending.     Review of Systems   Constitutional:  Positive for activity change. Negative for chills and fatigue.   HENT:  Negative for congestion and sinus pressure.    Respiratory:  Negative for cough and shortness of breath.    Cardiovascular:  Negative for chest pain.   Gastrointestinal:  Positive for nausea. Negative for abdominal distention, abdominal pain and vomiting.   Musculoskeletal:  Positive for joint swelling. Negative for arthralgias.   Skin:  Positive for color change. Negative for rash and wound.   Neurological:  Negative for dizziness and light-headedness.   Psychiatric/Behavioral:  Negative for agitation and behavioral problems.    Objective:     Vital Signs (Most Recent):  Temp: 98.2 °F (36.8 °C) (05/26/23 0747)  Pulse: (!) 56 (05/26/23 0747)  Resp: 17 (05/26/23 0747)  BP: 124/76 (05/26/23 0747)  SpO2: 98 % (05/26/23 0747) Vital Signs (24h Range):  Temp:  [96.4 °F (35.8 °C)-98.2 °F (36.8 °C)] 98.2 °F (36.8 °C)  Pulse:  [52-66] 56  Resp:  [16-20] 17  SpO2:  [95 %-100 %] 98 %  BP: (109-124)/(58-76) 124/76     Weight: 71.7 kg (158 lb)  Body mass index is 24.02 kg/m².    Estimated Creatinine Clearance: 75.2 mL/min (based on SCr of 1.2 mg/dL).     Physical Exam  Constitutional:       General: He is not in acute distress.     Appearance: Normal appearance. He is not ill-appearing.   HENT:      Head: Normocephalic and atraumatic.      Mouth/Throat:      Mouth: Mucous membranes are moist.      Pharynx: Oropharynx is clear.   Eyes:      Extraocular Movements: Extraocular movements intact.      Conjunctiva/sclera: Conjunctivae normal.   Cardiovascular:      Rate and Rhythm: Normal rate and regular rhythm.   Pulmonary:      Effort: Pulmonary effort is normal.       Breath sounds: Normal breath sounds.   Abdominal:      General: Abdomen is flat.      Palpations: Abdomen is soft.      Tenderness: There is no abdominal tenderness.   Musculoskeletal:         General: Normal range of motion.      Right lower leg: No edema.      Left lower leg: No edema.      Comments: Left wrist in bandage   Skin:     General: Skin is warm.   Neurological:      Mental Status: He is alert.        Significant Labs: CBC: No results for input(s): WBC, HGB, HCT, PLT in the last 48 hours.  CMP:   Recent Labs   Lab 05/26/23  0445   CREATININE 1.2     Wound Culture:   Recent Labs   Lab 05/23/23  1438   LABAERO STAPHYLOCOCCUS AUREUS  Rare  Susceptibility pending  *       Significant Imaging: I have reviewed all pertinent imaging results/findings within the past 24 hours.

## 2023-05-26 NOTE — ASSESSMENT & PLAN NOTE
Dong Snyder is 46yo M with septic arthritis of the left wrist s/p L wrist arthroscopic I&D 5/24    - Splint removed today, incision without drainage, soft dressing placed today  - Ok for regular diet  - FU ID final recs  - Continue broad spectrum IV abx   - NWB L wrist   - FU intraop cultures, preop cultures with staph aureus

## 2023-05-27 VITALS
OXYGEN SATURATION: 100 % | WEIGHT: 158 LBS | RESPIRATION RATE: 18 BRPM | DIASTOLIC BLOOD PRESSURE: 78 MMHG | SYSTOLIC BLOOD PRESSURE: 133 MMHG | BODY MASS INDEX: 23.95 KG/M2 | HEIGHT: 68 IN | HEART RATE: 60 BPM | TEMPERATURE: 98 F

## 2023-05-27 PROCEDURE — 25000003 PHARM REV CODE 250: Performed by: ORTHOPAEDIC SURGERY

## 2023-05-27 PROCEDURE — A4216 STERILE WATER/SALINE, 10 ML: HCPCS | Performed by: ORTHOPAEDIC SURGERY

## 2023-05-27 PROCEDURE — 63600175 PHARM REV CODE 636 W HCPCS: Performed by: INTERNAL MEDICINE

## 2023-05-27 PROCEDURE — 25000003 PHARM REV CODE 250: Performed by: INTERNAL MEDICINE

## 2023-05-27 PROCEDURE — 76937 US GUIDE VASCULAR ACCESS: CPT

## 2023-05-27 PROCEDURE — 25000003 PHARM REV CODE 250: Performed by: STUDENT IN AN ORGANIZED HEALTH CARE EDUCATION/TRAINING PROGRAM

## 2023-05-27 PROCEDURE — C1751 CATH, INF, PER/CENT/MIDLINE: HCPCS

## 2023-05-27 PROCEDURE — 36573 INSJ PICC RS&I 5 YR+: CPT

## 2023-05-27 RX ORDER — SODIUM CHLORIDE 0.9 % (FLUSH) 0.9 %
10 SYRINGE (ML) INJECTION
Status: DISCONTINUED | OUTPATIENT
Start: 2023-05-27 | End: 2023-05-27 | Stop reason: HOSPADM

## 2023-05-27 RX ORDER — ACETAMINOPHEN 500 MG
1000 TABLET ORAL 2 TIMES DAILY
Qty: 28 TABLET | Refills: 0 | Status: SHIPPED | OUTPATIENT
Start: 2023-05-27 | End: 2023-06-10

## 2023-05-27 RX ORDER — SODIUM CHLORIDE 0.9 % (FLUSH) 0.9 %
10 SYRINGE (ML) INJECTION EVERY 6 HOURS
Status: DISCONTINUED | OUTPATIENT
Start: 2023-05-27 | End: 2023-05-27 | Stop reason: HOSPADM

## 2023-05-27 RX ORDER — TRAMADOL HYDROCHLORIDE 50 MG/1
50 TABLET ORAL EVERY 6 HOURS PRN
Qty: 20 TABLET | Refills: 0 | Status: SHIPPED | OUTPATIENT
Start: 2023-05-27

## 2023-05-27 RX ORDER — IBUPROFEN 600 MG/1
600 TABLET ORAL EVERY 6 HOURS PRN
Qty: 28 TABLET | Refills: 0 | Status: SHIPPED | OUTPATIENT
Start: 2023-05-27

## 2023-05-27 RX ADMIN — ACETAMINOPHEN 650 MG: 325 TABLET ORAL at 03:05

## 2023-05-27 RX ADMIN — ACETAMINOPHEN 650 MG: 325 TABLET ORAL at 09:05

## 2023-05-27 RX ADMIN — ACETAMINOPHEN 650 MG: 325 TABLET ORAL at 02:05

## 2023-05-27 RX ADMIN — CEFAZOLIN 2 G: 2 INJECTION, POWDER, FOR SOLUTION INTRAMUSCULAR; INTRAVENOUS at 06:05

## 2023-05-27 RX ADMIN — CEFAZOLIN 2 G: 2 INJECTION, POWDER, FOR SOLUTION INTRAMUSCULAR; INTRAVENOUS at 02:05

## 2023-05-27 RX ADMIN — CELECOXIB 100 MG: 100 CAPSULE ORAL at 09:05

## 2023-05-27 RX ADMIN — Medication 10 ML: at 01:05

## 2023-05-27 NOTE — HOSPITAL COURSE
Underwent left wrist arthroscopic irrigation and debridement with Dr. Dewitt on 5/24/23. Tolerated procedure well and was transferred to PACU without issue. After recovery from sedatives, patient was transferred to the floor in good condition. ID was consulted for septic arthritis. Cx positive for staph aureus. ID recommendations for IV ancef and PICC line placed on 5/27/23. IV infusion therapy arranged before discharge. Patient's pain was controlled on an oral regimen at time of discharge and IV infusion education was completed. Follow up and med rec are below.

## 2023-05-27 NOTE — PLAN OF CARE
[facility]  Discharge Final Note    Primary Care Provider: Amor Reeder DO    Expected Discharge Date: 5/27/2023    CM received message from team, patient to discharge home today. Ronnie with [facility] confirmed education completed and antibiotics delivered to bedside. Patient is ready to discharge from case management standpoint. Team aware.    Final Discharge Note (most recent)       Final Note - 05/27/23 1705          Final Note    Assessment Type Final Discharge Note     Anticipated Discharge Disposition IV Therapy Provider     What phone number can be called within the next 1-3 days to see how you are doing after discharge? [phone]     Hospital Resources/Appts/Education Provided Provided patient/caregiver with written discharge plan information;Appointments scheduled by Navigator/Coordinator;Post-Acute resouces added to AVS        Post-Acute Status    Post-Acute Authorization IV Infusion     IV Infusion Status Set-up Complete/Auth obtained     Discharge Delays None known at this time                   Important Message from Medicare         Contact Info       Jamie L Russo-DiGeorge, PA-C   Specialty: Orthopedic Surgery    [address]  [facility] [address]   Phone: [phone]       Next Steps: Follow up in 2 week(s)    Instructions: For suture removal, For wound re-check          Ivelisse Smith RN  Weekend  - [facility] [place]  Spectralink: [phone]

## 2023-05-27 NOTE — PROCEDURES
"Srinivas Snyder Jr. is a 47 y.o. male patient.    Temp: 98.1 °F (36.7 °C) (05/27/23 1146)  Pulse: (!) 51 (05/27/23 1146)  Resp: 18 (05/27/23 1146)  BP: (!) 158/71 (05/27/23 1146)  SpO2: 100 % (05/27/23 1146)  Weight: 71.7 kg (158 lb) (05/24/23 1713)  Height: 5' 8" (172.7 cm) (05/24/23 1713)    PICC  Date/Time: 5/27/2023 11:25 AM  Performed by: Ashley Lozano RN  Assisting provider: Brenda Lim LPN  Time out: Immediately prior to procedure a time out was called to verify the correct patient, procedure, equipment, support staff and site/side marked as required  Indications: med administration and vascular access  Anesthesia: local infiltration  Local anesthetic: lidocaine 1% without epinephrine  Anesthetic Total (mL): 2  Preparation: skin prepped with ChloraPrep  Skin prep agent dried: skin prep agent completely dried prior to procedure  Sterile barriers: all five maximum sterile barriers used - cap, mask, sterile gown, sterile gloves, and large sterile sheet  Hand hygiene: hand hygiene performed prior to central venous catheter insertion  Location details: right basilic  Catheter type: double lumen  Catheter size: 5 Fr  Catheter Length: 37cm    Ultrasound guidance: yes  Vessel Caliber: medium and patent, compressibility normal  Vascular Doppler: not done  Needle advanced into vessel with real time Ultrasound guidance.  Guidewire confirmed in vessel.  Image recorded and saved.  Sterile sheath used.  Number of attempts: 1  Post-procedure: blood return through all ports, chlorhexidine patch and sterile dressing applied  Technical procedures used: 3CG  Specimens: No  Implants: No  Assessment: placement verified by x-ray  Complications: none        Name KOMAL LIM LPN  5/27/2023    "

## 2023-05-27 NOTE — PLAN OF CARE
[facility]    [facility] educator met with patient and spouse discussed discharge plan for home IVABX. Srinivas Umana Lillian Calvo. will dc home with family support. Patient will infuse medication via Elastomeric Pump. patient and spouse educated on S.A.S.H procedure. S.A.S.H mat provided.  Patient education checklist reviewed and acknowledged by above person(s) above and are agreeable to discharge with home infusion plan of care. IV administration process using aspetic technique was reviewed with successful return demonstration. Patient feels comfortable with infusion. Patient will dc home with cefazolin 2 GM IV q 8 hours 6/21/2023 ours for estimated end of therapy date 6/21/2023. Dosing schedule times are7 am, 3 pm, and 11 pm. Patient is aware to infuse at 11 pm tonight.  Extensions placed to right basilic 37 cm double lumen picc placed on 5/27/2023. Patient  will come to [facility] at [place] for weekly dressing changes and lab draws. Appointment made for Tuesday, May 30, 2023 at 9 am.Time allotted for questions. Patients nurse and case management team notified teaching has been completed.     Medication delivery made to bedside    [facility]  Ronnie Aly Rn, Clinical Liaison Manager  Cell [phone]  Office [phone]  Fax [phone]

## 2023-05-27 NOTE — PLAN OF CARE
[facility]      HOME HEALTH ORDERS  FACE TO FACE ENCOUNTER    Patient Name: Srinivas Snyder Jr.  YOB: 1975    PCP: Amor Reeder DO   PCP Address: [address] / [place] [address]  PCP Phone Number: [phone]  PCP Fax: [phone]    Encounter Date: 5/24/23    Admit to Home Health    Diagnoses:  Active Hospital Problems    Diagnosis  POA    *Septic arthritis of wrist, left [M00.9]  Yes      Resolved Hospital Problems   No resolved problems to display.       Follow Up Appointments:  Future Appointments   Date Time Provider Department Center   5/30/2023 11:20 AM Amor Reeder DO [facility] [place]   6/5/2023 11:00 AM MD [facility] IN[facility] [place]   6/9/2023  3:00 PM Jamie L. Russo-Digeorge, PA-C [facility]   6/19/2023 11:00 AM Nhan Jones MD [facility] [place]       Allergies:Review of patient's allergies indicates:  No Known Allergies    Medications: Review discharge medications with patient and family and provide education.    Current Facility-Administered Medications   Medication Dose Route Frequency Provider Last Rate Last Admin    acetaminophen tablet 650 mg  650 mg Oral Q6H Dong Sanchez MD   650 mg at 05/27/23 0300    ceFAZolin 2 g in dextrose 5 % in water (D5W) 5 % 50 mL IVPB (MB+)  2 g Intravenous Q8H Nhan Jones MD   Stopped at 05/27/23 0700    celecoxib capsule 100 mg  100 mg Oral Daily Dong Sanchez MD   100 mg at 05/26/23 0913    fentaNYL 50 mcg/mL injection 50 mcg  50 mcg Intravenous Q10 Min PRN Teri Hutchison MD   50 mcg at 05/24/23 1738    LIDOcaine (PF) 10 mg/ml (1%) injection 10 mg  1 mL Intradermal Once PRN Dong Sanchez MD        melatonin tablet 6 mg  6 mg Oral Nightly PRN Dong Sanchez MD        ondansetron disintegrating tablet 8 mg  8 mg Oral Q8H PRN Dong Sanchez MD   8 mg at 05/25/23 0642    oxyCODONE immediate release tablet 5 mg  5 mg  Oral Q4H PRN Dong Sanchez MD        oxyCODONE immediate release tablet Tab 10 mg  10 mg Oral Q4H PRN Dong Sanchez MD   10 mg at 05/25/23 0152    prochlorperazine injection Soln 2.5 mg  2.5 mg Intravenous Q6H PRN Jeyson Rodriguez MD   2.5 mg at 05/25/23 0959    sodium chloride 0.9% flush 10 mL  10 mL Intravenous PRN Dong Sanchez MD         Current Discharge Medication List        CONTINUE these medications which have NOT CHANGED    Details   atorvastatin (LIPITOR) 20 MG tablet TAKE 1 TABLET(20 MG) BY MOUTH EVERY DAY  Qty: 90 tablet, Refills: 3    Associated Diagnoses: Hypercholesteremia      levothyroxine (SYNTHROID) 137 MCG Tab tablet Take 1 tablet (137 mcg total) by mouth before breakfast.  Qty: 90 tablet, Refills: 3    Associated Diagnoses: Hypothyroidism due to acquired atrophy of thyroid      !! lisdexamfetamine (VYVANSE) 30 MG capsule Take 1 capsule (30 mg total) by mouth every morning.  Qty: 30 capsule, Refills: 0    Associated Diagnoses: Attention deficit disorder, unspecified hyperactivity presence      HYDROcodone-acetaminophen (NORCO) 5-325 mg per tablet Take 1 tablet by mouth every 6 (six) hours as needed for Pain.  Qty: 12 tablet, Refills: 0      ibuprofen (ADVIL,MOTRIN) 600 MG tablet Take 1 tablet (600 mg total) by mouth every 6 (six) hours as needed for Pain.  Qty: 20 tablet, Refills: 0      indomethacin (INDOCIN) 50 MG capsule Take 1 capsule (50 mg total) by mouth 3 (three) times daily.  Qty: 90 capsule, Refills: 0    Associated Diagnoses: Arthritis of left wrist due to gout      !! lisdexamfetamine (VYVANSE) 30 MG capsule Take 1 capsule (30 mg total) by mouth every morning.  Qty: 30 capsule, Refills: 0    Associated Diagnoses: Attention deficit disorder, unspecified hyperactivity presence      !! lisdexamfetamine (VYVANSE) 30 MG capsule Take 1 capsule (30 mg total) by mouth every morning.  Qty: 30 capsule, Refills: 0    Associated Diagnoses: Attention deficit disorder,  unspecified hyperactivity presence      methylPREDNISolone (MEDROL DOSEPACK) 4 mg tablet use as directed  Qty: 21 each, Refills: 0    Associated Diagnoses: Arthritis of left wrist due to gout       !! - Potential duplicate medications found. Please discuss with provider.            I have seen and examined this patient within the last 30 days. My clinical findings that support the need for the home health skilled services and home bound status are the following:no   Patient with medication mismanagement issues requiring home bound status as evidenced by  need for IV ABx.     Diet:   regular diet      Activities:   ambulate in house, NWB L wrist/hand    Nursing:   Agency to admit patient within 24 hours of hospital discharge unless specified on physician order or at patient request    SN to complete comprehensive assessment including routine vital signs. Instruct on disease process and s/s of complications to report to MD. Review/verify medication list sent home with the patient at time of discharge  and instruct patient/caregiver as needed. Frequency may be adjusted depending on start of care date.     Skilled nurse to perform up to 3 visits PRN for symptoms related to diagnosis    Notify MD if SBP > 160 or < 90; DBP > 90 or < 50; HR > 120 or < 50; Temp > 101; O2 < 88%    Ok to schedule additional visits based on staff availability and patient request on consecutive days within the home health episode.    When multiple disciplines ordered:    Start of Care occurs on Sunday - Wednesday schedule remaining discipline evaluations as ordered on separate consecutive days following the start of care.    Thursday SOC -schedule subsequent evaluations Friday and Monday the following week.     Friday - Saturday SOC - schedule subsequent discipline evaluations on consecutive days starting Monday of the following week.    For all post-discharge communication and subsequent orders please contact patient's primary care physician.  If unable to reach primary care physician or do not receive response within 30 minutes, please contact orthopedics on call for clinical staff order clarification    Miscellaneous   Home Infusion Therapy:   SN to perform Infusion Therapy/Central Line Care.  Review Central Line Care & Central Line Flush with patient.    Outpatient Antibiotic Therapy Plan:     Please send referral to [facility].     1) Infection: Septic left wrist (MSSA)     2) Discharge Antibiotics:     Intravenous antibiotics:  Ancef 2 grams IV q 8 hours     3) Therapy Duration:  4 weeks     Estimated end date of IV antibiotics: 06/21/2023     4) Outpatient Weekly Labs:     Order the following labs to be drawn on Mondays:   CBC  CMP   CRP     5) Fax Lab Results to Infectious Diseases Provider: Robert     [facility] Fax Number: [phone]     6) Outpatient Infectious Diseases Follow-up     Follow-up appointment will be arranged by the ID clinic and will be found in the patient's appointments tab.     Prior to discharge, please ensure the patient's follow-up has been scheduled.    If there is still no follow-up scheduled prior to discharge, please send an EPIC message to Aurora Malloy in Infectious Diseases.    Scrub the Hub: Prior to accessing the line, always perform a 30 second alcohol scrub  Each lumen of the central line is to be flushed at least daily with 10 mL Normal Saline and 3 mL Heparin flush (10 units/mL)  Skilled Nurse (SN) may draw blood from IV access  Blood Draw Procedure:   - Aspirate at least 5 mL of blood   - Discard   - Obtain specimen   - Change injection cap   - Flush with 20 mL Normal Saline followed by a                 3-5 mL Heparin flush (10 units/mL)  Central :   - Sterile dressing changes are done weekly and as needed.   - Use chlor-hexadine scrub to cleanse site, apply Biopatch to insertion site,       apply securement device dressing   - Injection caps are  changed weekly and after EVERY lab draw.   - If sterile gauze is under dressing to control oozing,                 dressing change must be performed every 24 hours until gauze is not needed.    Wound Care Orders  no    I certify that this patient is confined to his home and needs intermittent skilled nursing care.

## 2023-05-27 NOTE — ASSESSMENT & PLAN NOTE
Dong Snyder is 46yo M with septic arthritis of the left wrist s/p L wrist arthroscopic I&D 5/24    - Splint removed today, incision without drainage, soft dressing placed today  - Ok for regular diet  - Ancef 2g q8  - NWB L wrist   - FU intraop cultures, preop cultures with staph aureus

## 2023-05-27 NOTE — SUBJECTIVE & OBJECTIVE
"Principal Problem:Septic arthritis of wrist, left    Principal Orthopedic Problem: same as above s/p L wrist arthroscopic I&D 5/24    Interval History: NAEO. VSS, bradycardia noted. Final ID recs placed. CRP decreased to 17.8 from 63.2. Plan to discharge home today once IV Abx set up.    Review of patient's allergies indicates:  No Known Allergies    Current Facility-Administered Medications   Medication    acetaminophen tablet 650 mg    ceFAZolin 2 g in dextrose 5 % in water (D5W) 5 % 50 mL IVPB (MB+)    celecoxib capsule 100 mg    fentaNYL 50 mcg/mL injection 50 mcg    LIDOcaine (PF) 10 mg/ml (1%) injection 10 mg    melatonin tablet 6 mg    ondansetron disintegrating tablet 8 mg    oxyCODONE immediate release tablet 5 mg    oxyCODONE immediate release tablet Tab 10 mg    prochlorperazine injection Soln 2.5 mg    sodium chloride 0.9% flush 10 mL     Objective:     Vital Signs (Most Recent):  Temp: 96.4 °F (35.8 °C) (05/27/23 0443)  Pulse: (!) 55 (05/27/23 0443)  Resp: 16 (05/27/23 0443)  BP: 120/72 (05/27/23 0443)  SpO2: 99 % (05/27/23 0443) Vital Signs (24h Range):  Temp:  [96.2 °F (35.7 °C)-98.3 °F (36.8 °C)] 96.4 °F (35.8 °C)  Pulse:  [52-60] 55  Resp:  [16-18] 16  SpO2:  [92 %-100 %] 99 %  BP: (112-140)/(69-86) 120/72     Weight: 71.7 kg (158 lb)  Height: 5' 8" (172.7 cm)  Body mass index is 24.02 kg/m².      Intake/Output Summary (Last 24 hours) at 5/27/2023 0551  Last data filed at 5/26/2023 1652  Gross per 24 hour   Intake 900 ml   Output --   Net 900 ml          Ortho/SPM Exam  GEN: NAD  Pulm: unlabored respirations, equal bilat chest rise  CV: Extremities WWP    Incision c/d/I, mild erythema, pain improving  Painless ROM of fingers  SILT     Significant Labs: All pertinent labs within the past 24 hours have been reviewed.    Significant Imaging: I have reviewed all pertinent imaging results/findings.  "

## 2023-05-27 NOTE — PROGRESS NOTES
"[facility]  Orthopedics  Progress Note    Patient Name: Srinivas Snyder Jr.  MRN: [id number]  Admission Date: 5/24/2023  Hospital Length of Stay: 3 days  Attending Provider: Dontae Dewitt MD  Primary Care Provider: Amor Reeder DO  Follow-up For: Procedure(s) (LRB):  ARTHROSCOPY, WRIST, WITH DEBRIDEMENT (Left)    Post-Operative Day: 3 Days Post-Op  Subjective:     Principal Problem:Septic arthritis of wrist, left    Principal Orthopedic Problem: same as above s/p L wrist arthroscopic I&D 5/24    Interval History: NAEO. VSS, bradycardia noted. Final ID recs placed. CRP decreased to 17.8 from 63.2. Plan to discharge home today once IV Abx set up.    Review of patient's allergies indicates:  No Known Allergies    Current Facility-Administered Medications   Medication    acetaminophen tablet 650 mg    ceFAZolin 2 g in dextrose 5 % in water (D5W) 5 % 50 mL IVPB (MB+)    celecoxib capsule 100 mg    fentaNYL 50 mcg/mL injection 50 mcg    LIDOcaine (PF) 10 mg/ml (1%) injection 10 mg    melatonin tablet 6 mg    ondansetron disintegrating tablet 8 mg    oxyCODONE immediate release tablet 5 mg    oxyCODONE immediate release tablet Tab 10 mg    prochlorperazine injection Soln 2.5 mg    sodium chloride 0.9% flush 10 mL     Objective:     Vital Signs (Most Recent):  Temp: 96.4 °F (35.8 °C) (05/27/23 0443)  Pulse: (!) 55 (05/27/23 0443)  Resp: 16 (05/27/23 0443)  BP: 120/72 (05/27/23 0443)  SpO2: 99 % (05/27/23 0443) Vital Signs (24h Range):  Temp:  [96.2 °F (35.7 °C)-98.3 °F (36.8 °C)] 96.4 °F (35.8 °C)  Pulse:  [52-60] 55  Resp:  [16-18] 16  SpO2:  [92 %-100 %] 99 %  BP: (112-140)/(69-86) 120/72     Weight: 71.7 kg (158 lb)  Height: 5' 8" (172.7 cm)  Body mass index is 24.02 kg/m².      Intake/Output Summary (Last 24 hours) at 5/27/2023 0551  Last data filed at 5/26/2023 1652  Gross per 24 hour   Intake 900 ml   Output --   Net 900 ml         Ortho/SPM Exam  GEN: NAD  Pulm: unlabored respirations, " equal bilat chest rise  CV: Extremities WWP    Incision c/d/I, mild erythema, pain improving  Painless ROM of fingers  SILT     Significant Labs: All pertinent labs within the past 24 hours have been reviewed.    Significant Imaging: I have reviewed all pertinent imaging results/findings.    Assessment/Plan:     * Septic arthritis of wrist, left  Dong Block is 46yo M with septic arthritis of the left wrist s/p L wrist arthroscopic I&D 5/24    - Splint removed today, incision without drainage, soft dressing placed today  - Ok for regular diet  - Ancef 2g q8  - NWB L wrist   - FU intraop cultures, preop cultures with staph aureus            Dennis Lorenzo MD  Orthopedics  [facility]

## 2023-05-27 NOTE — NURSING
Pt received discharge instructions. Verbalized understanding of all instructions. Prescriptions delivered to bedside via pharmacy. PIV removed, no redness/swelling. PICC in place to RUE for home use. Education provided by infusion nurse and antibiotics delivered to bedside. Awaiting wheelchair for transport.

## 2023-05-27 NOTE — CONSULTS
D/L PICC placed in R basilic vein, 37cm in length with 0cm exposed. Arm circumference 32cm. Lot# [id number]

## 2023-05-28 NOTE — DISCHARGE SUMMARY
[facility]  Orthopedics  Discharge Summary      Patient Name: Srinivas Snyder Jr.  MRN: [id number]  Admission Date: 5/24/2023  Hospital Length of Stay: 3 days  Discharge Date and Time: 5/27/2023  4:47 PM  Attending Physician: No att. providers found   Discharging Provider: Dennis Lorenzo MD  Primary Care Provider: Amor Reeder DO    HPI:   Patient is a 47 y.o. right hand dominant male who presents today with complaints of left wrist and elbow pain. He says he began having severe pain in his left wrist starting on Saturday last weekend. He states he was unable to move the wrist and it was equisitely tender. He says the pain was severe enough to go to the ER last night where morphine slightly helped his pain. He is still complaining of a significant amount of pain today. It is slightly red today. He has not noticed any significant swelling. Uric acid drawn in the ED yesterday was 7.8. CRP 12.4. He has no history of gout. No history of surgeries to the hand or wrist.     The patient is a/an .     Onset of symptoms/DOI was Saturday, 5/20.     Symptoms are aggravated by activity, movement, at night, and during the day.     Symptoms are alleviated by  none .     Symptoms consist of pain, erythema, and decreased ROM.     The patient rates their pain as a 6/10.     Attempted treatment(s) and/or interventions include activity modifications, rest, rest, activity modification, anti-inflammatory medications, and narcotic medications.     The patient denies any fevers, chills, N/V, D/C and presents for evaluation.    Procedure(s) (LRB):  ARTHROSCOPY, WRIST, WITH DEBRIDEMENT (Left)      Hospital Course:  Underwent left wrist arthroscopic irrigation and debridement with Dr. Dewitt on 5/24/23. Tolerated procedure well and was transferred to PACU without issue. After recovery from sedatives, patient was transferred to the floor in good condition. ID was consulted for septic arthritis. Cx positive  for staph aureus. ID recommendations for IV ancef and PICC line placed on 5/27/23. IV infusion therapy arranged before discharge. Patient's pain was controlled on an oral regimen at time of discharge and IV infusion education was completed. Follow up and med rec are below.       Goals of Care Treatment Preferences:  Code Status: Full Code      Consults (From admission, onward)        Status Ordering Provider     Inpatient consult to PICC team ([facility])  Once        Provider:  (Not yet assigned)    Completed CIERRA REESE     Inpatient consult to Infectious Diseases  Once        Provider:  (Not yet assigned)    Completed KENYA CARRILLO          Significant Diagnostic Studies: Labs: All labs within the past 24 hours have been reviewed    Pending Diagnostic Studies:     None        Final Active Diagnoses:    Diagnosis Date Noted POA    PRINCIPAL PROBLEM:  Septic arthritis of wrist, left [M00.9] 05/25/2023 Yes      Problems Resolved During this Admission:      Discharged Condition: good    Disposition: Home or Self Care    Follow Up:   Follow-up Information     Jamie L Russo-DiGeorge, PA-C Follow up in 2 week(s).    Specialty: Orthopedic Surgery  Why: For suture removal, For wound re-check  Contact information:  [facility] [place] [facility] [address]  [phone]                       Patient Instructions:      Notify your health care provider if you experience any of the following:  temperature >100.4     Notify your health care provider if you experience any of the following:  persistent nausea and vomiting or diarrhea     Notify your health care provider if you experience any of the following:  severe uncontrolled pain     Notify your health care provider if you experience any of the following:  redness, tenderness, or signs of infection (pain, swelling, redness, odor or green/yellow discharge around incision site)     Notify your health care provider if you experience any of the following:  difficulty  breathing or increased cough     Notify your health care provider if you experience any of the following:  severe persistent headache     Notify your health care provider if you experience any of the following:  worsening rash     Notify your health care provider if you experience any of the following:  persistent dizziness, light-headedness, or visual disturbances     Notify your health care provider if you experience any of the following:  increased confusion or weakness     Leave dressing on - Keep it clean, dry, and intact until clinic visit     Weight bearing restrictions (specify):   Order Comments: CHANELLE SINGH     Medications:  Reconciled Home Medications:      Medication List      START taking these medications    acetaminophen 500 MG tablet  Commonly known as: TYLENOL  Take 2 tablets (1,000 mg total) by mouth 2 (two) times a day. for 14 days     traMADoL 50 mg tablet  Commonly known as: ULTRAM  Take 1 tablet (50 mg total) by mouth every 6 (six) hours as needed for Pain.        CHANGE how you take these medications    * ibuprofen 600 MG tablet  Commonly known as: ADVIL,MOTRIN  Take 1 tablet (600 mg total) by mouth every 6 (six) hours as needed for Pain.  What changed: Another medication with the same name was added. Make sure you understand how and when to take each.     * ibuprofen 600 MG tablet  Commonly known as: ADVIL,MOTRIN  Take 1 tablet (600 mg total) by mouth every 6 (six) hours as needed for Pain.  What changed: You were already taking a medication with the same name, and this prescription was added. Make sure you understand how and when to take each.         * This list has 2 medication(s) that are the same as other medications prescribed for you. Read the directions carefully, and ask your doctor or other care provider to review them with you.            CONTINUE taking these medications    atorvastatin 20 MG tablet  Commonly known as: LIPITOR  TAKE 1 TABLET(20 MG) BY MOUTH EVERY DAY     indomethacin  50 MG capsule  Commonly known as: INDOCIN  Take 1 capsule (50 mg total) by mouth 3 (three) times daily.     levothyroxine 137 MCG Tab tablet  Commonly known as: SYNTHROID  Take 1 tablet (137 mcg total) by mouth before breakfast.     * lisdexamfetamine 30 MG capsule  Commonly known as: VYVANSE  Take 1 capsule (30 mg total) by mouth every morning.     * lisdexamfetamine 30 MG capsule  Commonly known as: VYVANSE  Take 1 capsule (30 mg total) by mouth every morning.     * lisdexamfetamine 30 MG capsule  Commonly known as: VYVANSE  Take 1 capsule (30 mg total) by mouth every morning.     methylPREDNISolone 4 mg tablet  Commonly known as: MEDROL DOSEPACK  use as directed         * This list has 3 medication(s) that are the same as other medications prescribed for you. Read the directions carefully, and ask your doctor or other care provider to review them with you.            STOP taking these medications    HYDROcodone-acetaminophen 5-325 mg per tablet  Commonly known as: NAT Lorenzo MD  Orthopedics  [facility] Surgery

## 2023-05-29 LAB — BACTERIA SPEC ANAEROBE CULT: NORMAL

## 2023-05-30 ENCOUNTER — PATIENT MESSAGE (OUTPATIENT)
Dept: INFECTIOUS DISEASES | Facility: CLINIC | Age: 48
End: 2023-05-30
Payer: COMMERCIAL

## 2023-05-30 ENCOUNTER — PATIENT MESSAGE (OUTPATIENT)
Dept: ORTHOPEDICS | Facility: CLINIC | Age: 48
End: 2023-05-30
Payer: COMMERCIAL

## 2023-05-30 ENCOUNTER — OFFICE VISIT (OUTPATIENT)
Dept: INTERNAL MEDICINE | Facility: CLINIC | Age: 48
End: 2023-05-30
Payer: COMMERCIAL

## 2023-05-30 ENCOUNTER — INFUSION (OUTPATIENT)
Dept: INFUSION THERAPY | Facility: HOSPITAL | Age: 48
End: 2023-05-30
Attending: INTERNAL MEDICINE
Payer: COMMERCIAL

## 2023-05-30 VITALS
DIASTOLIC BLOOD PRESSURE: 76 MMHG | WEIGHT: 163.69 LBS | TEMPERATURE: 98 F | HEIGHT: 68 IN | SYSTOLIC BLOOD PRESSURE: 134 MMHG | RESPIRATION RATE: 18 BRPM | BODY MASS INDEX: 24.81 KG/M2 | HEART RATE: 75 BPM | OXYGEN SATURATION: 99 %

## 2023-05-30 DIAGNOSIS — F98.8 ATTENTION DEFICIT DISORDER, UNSPECIFIED HYPERACTIVITY PRESENCE: ICD-10-CM

## 2023-05-30 DIAGNOSIS — E78.00 HYPERCHOLESTEREMIA: ICD-10-CM

## 2023-05-30 DIAGNOSIS — E03.4 HYPOTHYROIDISM DUE TO ACQUIRED ATROPHY OF THYROID: ICD-10-CM

## 2023-05-30 DIAGNOSIS — M00.832 ARTHRITIS OF LEFT WRIST DUE TO OTHER BACTERIA: Primary | ICD-10-CM

## 2023-05-30 DIAGNOSIS — M00.9 PYOGENIC ARTHRITIS OF LEFT WRIST, DUE TO UNSPECIFIED ORGANISM: Primary | ICD-10-CM

## 2023-05-30 LAB
ALBUMIN SERPL BCP-MCNC: 3.4 G/DL (ref 3.5–5.2)
ALP SERPL-CCNC: 78 U/L (ref 55–135)
ALT SERPL W/O P-5'-P-CCNC: 8 U/L (ref 10–44)
ANION GAP SERPL CALC-SCNC: 10 MMOL/L (ref 8–16)
AST SERPL-CCNC: 20 U/L (ref 10–40)
BASOPHILS # BLD AUTO: 0.03 K/UL (ref 0–0.2)
BASOPHILS NFR BLD: 0.5 % (ref 0–1.9)
BILIRUB SERPL-MCNC: 0.2 MG/DL (ref 0.1–1)
BUN SERPL-MCNC: 12 MG/DL (ref 6–20)
CALCIUM SERPL-MCNC: 8.6 MG/DL (ref 8.7–10.5)
CHLORIDE SERPL-SCNC: 106 MMOL/L (ref 95–110)
CO2 SERPL-SCNC: 23 MMOL/L (ref 23–29)
CREAT SERPL-MCNC: 1 MG/DL (ref 0.5–1.4)
CRP SERPL-MCNC: 6.9 MG/L (ref 0–8.2)
DIFFERENTIAL METHOD: ABNORMAL
EOSINOPHIL # BLD AUTO: 0.5 K/UL (ref 0–0.5)
EOSINOPHIL NFR BLD: 9.5 % (ref 0–8)
ERYTHROCYTE [DISTWIDTH] IN BLOOD BY AUTOMATED COUNT: 12.6 % (ref 11.5–14.5)
EST. GFR  (NO RACE VARIABLE): >60 ML/MIN/1.73 M^2
GLUCOSE SERPL-MCNC: 110 MG/DL (ref 70–110)
HCT VFR BLD AUTO: 39.3 % (ref 40–54)
HGB BLD-MCNC: 12.6 G/DL (ref 14–18)
IMM GRANULOCYTES # BLD AUTO: 0.04 K/UL (ref 0–0.04)
IMM GRANULOCYTES NFR BLD AUTO: 0.7 % (ref 0–0.5)
LYMPHOCYTES # BLD AUTO: 1.2 K/UL (ref 1–4.8)
LYMPHOCYTES NFR BLD: 21 % (ref 18–48)
MCH RBC QN AUTO: 30.8 PG (ref 27–31)
MCHC RBC AUTO-ENTMCNC: 32.1 G/DL (ref 32–36)
MCV RBC AUTO: 96 FL (ref 82–98)
MONOCYTES # BLD AUTO: 0.4 K/UL (ref 0.3–1)
MONOCYTES NFR BLD: 6.6 % (ref 4–15)
NEUTROPHILS # BLD AUTO: 3.4 K/UL (ref 1.8–7.7)
NEUTROPHILS NFR BLD: 61.7 % (ref 38–73)
NRBC BLD-RTO: 0 /100 WBC
PLATELET # BLD AUTO: 206 K/UL (ref 150–450)
PMV BLD AUTO: 11 FL (ref 9.2–12.9)
POTASSIUM SERPL-SCNC: 3.7 MMOL/L (ref 3.5–5.1)
PROT SERPL-MCNC: 6.6 G/DL (ref 6–8.4)
RBC # BLD AUTO: 4.09 M/UL (ref 4.6–6.2)
SODIUM SERPL-SCNC: 139 MMOL/L (ref 136–145)
WBC # BLD AUTO: 5.58 K/UL (ref 3.9–12.7)

## 2023-05-30 PROCEDURE — 3075F SYST BP GE 130 - 139MM HG: CPT | Mod: CPTII,S$GLB,, | Performed by: INTERNAL MEDICINE

## 2023-05-30 PROCEDURE — 1160F PR REVIEW ALL MEDS BY PRESCRIBER/CLIN PHARMACIST DOCUMENTED: ICD-10-PCS | Mod: CPTII,S$GLB,, | Performed by: INTERNAL MEDICINE

## 2023-05-30 PROCEDURE — 3075F PR MOST RECENT SYSTOLIC BLOOD PRESS GE 130-139MM HG: ICD-10-PCS | Mod: CPTII,S$GLB,, | Performed by: INTERNAL MEDICINE

## 2023-05-30 PROCEDURE — 99999 PR PBB SHADOW E&M-EST. PATIENT-LVL III: ICD-10-PCS | Mod: PBBFAC,,, | Performed by: INTERNAL MEDICINE

## 2023-05-30 PROCEDURE — 85025 COMPLETE CBC W/AUTO DIFF WBC: CPT | Performed by: INTERNAL MEDICINE

## 2023-05-30 PROCEDURE — 1159F PR MEDICATION LIST DOCUMENTED IN MEDICAL RECORD: ICD-10-PCS | Mod: CPTII,S$GLB,, | Performed by: INTERNAL MEDICINE

## 2023-05-30 PROCEDURE — 3078F DIAST BP <80 MM HG: CPT | Mod: CPTII,S$GLB,, | Performed by: INTERNAL MEDICINE

## 2023-05-30 PROCEDURE — 1111F DSCHRG MED/CURRENT MED MERGE: CPT | Mod: CPTII,S$GLB,, | Performed by: INTERNAL MEDICINE

## 2023-05-30 PROCEDURE — 80053 COMPREHEN METABOLIC PANEL: CPT | Performed by: INTERNAL MEDICINE

## 2023-05-30 PROCEDURE — 1111F PR DISCHARGE MEDS RECONCILED W/ CURRENT OUTPATIENT MED LIST: ICD-10-PCS | Mod: CPTII,S$GLB,, | Performed by: INTERNAL MEDICINE

## 2023-05-30 PROCEDURE — 99214 OFFICE O/P EST MOD 30 MIN: CPT | Mod: S$GLB,,, | Performed by: INTERNAL MEDICINE

## 2023-05-30 PROCEDURE — 99999 PR PBB SHADOW E&M-EST. PATIENT-LVL III: CPT | Mod: PBBFAC,,, | Performed by: INTERNAL MEDICINE

## 2023-05-30 PROCEDURE — 1160F RVW MEDS BY RX/DR IN RCRD: CPT | Mod: CPTII,S$GLB,, | Performed by: INTERNAL MEDICINE

## 2023-05-30 PROCEDURE — 3078F PR MOST RECENT DIASTOLIC BLOOD PRESSURE < 80 MM HG: ICD-10-PCS | Mod: CPTII,S$GLB,, | Performed by: INTERNAL MEDICINE

## 2023-05-30 PROCEDURE — 3008F PR BODY MASS INDEX (BMI) DOCUMENTED: ICD-10-PCS | Mod: CPTII,S$GLB,, | Performed by: INTERNAL MEDICINE

## 2023-05-30 PROCEDURE — 1159F MED LIST DOCD IN RCRD: CPT | Mod: CPTII,S$GLB,, | Performed by: INTERNAL MEDICINE

## 2023-05-30 PROCEDURE — 99214 PR OFFICE/OUTPT VISIT, EST, LEVL IV, 30-39 MIN: ICD-10-PCS | Mod: S$GLB,,, | Performed by: INTERNAL MEDICINE

## 2023-05-30 PROCEDURE — 3008F BODY MASS INDEX DOCD: CPT | Mod: CPTII,S$GLB,, | Performed by: INTERNAL MEDICINE

## 2023-05-30 PROCEDURE — 86140 C-REACTIVE PROTEIN: CPT | Performed by: INTERNAL MEDICINE

## 2023-05-30 RX ORDER — LISDEXAMFETAMINE DIMESYLATE 30 MG/1
30 CAPSULE ORAL EVERY MORNING
Qty: 30 CAPSULE | Refills: 0 | Status: SHIPPED | OUTPATIENT
Start: 2023-07-30 | End: 2023-09-13 | Stop reason: SDUPTHER

## 2023-05-30 RX ORDER — LISDEXAMFETAMINE DIMESYLATE 30 MG/1
30 CAPSULE ORAL EVERY MORNING
Qty: 30 CAPSULE | Refills: 0 | Status: SHIPPED | OUTPATIENT
Start: 2023-05-30 | End: 2023-09-13 | Stop reason: SDUPTHER

## 2023-05-30 RX ORDER — LISDEXAMFETAMINE DIMESYLATE 30 MG/1
30 CAPSULE ORAL EVERY MORNING
Qty: 30 CAPSULE | Refills: 0 | Status: SHIPPED | OUTPATIENT
Start: 2023-06-30 | End: 2023-09-13 | Stop reason: SDUPTHER

## 2023-05-30 NOTE — PROGRESS NOTES
PICC dressing change provided to right UA utilizing PICC protocol.  Blood return through both ports. Chlorhexidine patch and sterile dressing applied. Site free from s/s of infection - no redness, no drainage, no edema.     Labs drawn: CBC, CMP, C-Reactive Protein     Pt tolerated well.  Time allotted for questions.     Delivery confirmed for medication delivery. Return appt provided.

## 2023-05-30 NOTE — PLAN OF CARE
Problem: Infection  Goal: Absence of Infection Signs and Symptoms  Outcome: Ongoing, Progressing  Intervention: Prevent or Manage Infection  Flowsheets (Taken 5/30/2023 1014)  Infection Management: aseptic technique maintained  Isolation Precautions:   precautions initiated   precautions maintained

## 2023-05-30 NOTE — PROGRESS NOTES
Subjective     Patient ID: Srinivas Snyder Jr. is a 47 y.o. male.    Chief Complaint: Medication Refill    HPI  Pt with ADD, Hypothyroidism, HLD is here for f/u. Requesting refills of his Vyvanse which has been controlling his symptoms. No med SE's.    Pt currently being treated for septic arthritis of his left wrist. On IV Abx.   Review of Systems   Constitutional:  Negative for activity change, appetite change, chills, diaphoresis, fatigue, fever and unexpected weight change.   HENT:  Negative for hearing loss, postnasal drip, rhinorrhea, sinus pressure/congestion, sneezing, sore throat, trouble swallowing and voice change.    Eyes:  Negative for discharge and visual disturbance.   Respiratory:  Negative for cough, chest tightness, shortness of breath and wheezing.    Cardiovascular:  Negative for chest pain, palpitations and leg swelling.   Gastrointestinal:  Negative for abdominal pain, blood in stool, constipation, diarrhea, nausea and vomiting.   Endocrine: Negative for polydipsia and polyuria.   Genitourinary:  Negative for difficulty urinating, dysuria, hematuria and urgency.   Musculoskeletal:  Negative for arthralgias, joint swelling, myalgias and neck pain.   Integumentary:  Negative for rash and wound.   Allergic/Immunologic: Negative for environmental allergies and food allergies.   Neurological:  Negative for weakness and headaches.   Hematological:  Negative for adenopathy. Does not bruise/bleed easily.   Psychiatric/Behavioral:  Positive for decreased concentration. Negative for confusion, dysphoric mood, self-injury and suicidal ideas.         Objective     Physical Exam  Constitutional:       General: He is not in acute distress.     Appearance: Normal appearance. He is well-developed. He is not diaphoretic.   HENT:      Head: Normocephalic and atraumatic.      Right Ear: External ear normal.      Left Ear: External ear normal.      Nose: Nose normal.      Mouth/Throat:      Pharynx: No  oropharyngeal exudate.   Eyes:      General: No scleral icterus.        Right eye: No discharge.         Left eye: No discharge.      Conjunctiva/sclera: Conjunctivae normal.      Pupils: Pupils are equal, round, and reactive to light.   Neck:      Vascular: No JVD.   Cardiovascular:      Rate and Rhythm: Normal rate and regular rhythm.      Pulses: Normal pulses.      Heart sounds: Normal heart sounds. No murmur heard.  Pulmonary:      Effort: Pulmonary effort is normal. No respiratory distress.      Breath sounds: Normal breath sounds. No wheezing or rales.   Abdominal:      General: Bowel sounds are normal.      Tenderness: There is no abdominal tenderness. There is no guarding or rebound.   Musculoskeletal:      Cervical back: Normal range of motion and neck supple.      Right lower leg: No edema.      Left lower leg: No edema.      Comments: Left wrist wrapped   Lymphadenopathy:      Cervical: No cervical adenopathy.   Skin:     General: Skin is warm and dry.      Capillary Refill: Capillary refill takes less than 2 seconds.      Coloration: Skin is not pale.      Findings: No rash.   Neurological:      Mental Status: He is alert and oriented to person, place, and time. Mental status is at baseline.      Cranial Nerves: No cranial nerve deficit.   Psychiatric:         Mood and Affect: Mood normal.         Behavior: Behavior normal.          Assessment and Plan     1. Pyogenic arthritis of left wrist, due to unspecified organism    2. Attention deficit disorder, unspecified hyperactivity presence  -     lisdexamfetamine (VYVANSE) 30 MG capsule; Take 1 capsule (30 mg total) by mouth every morning.  Dispense: 30 capsule; Refill: 0  -     lisdexamfetamine (VYVANSE) 30 MG capsule; Take 1 capsule (30 mg total) by mouth every morning.  Dispense: 30 capsule; Refill: 0  -     lisdexamfetamine (VYVANSE) 30 MG capsule; Take 1 capsule (30 mg total) by mouth every morning.  Dispense: 30 capsule; Refill: 0    3.  Hypothyroidism due to acquired atrophy of thyroid    4. Hypercholesteremia         ADD- stable, refill Vyvanse 30 mg qam      Hypothyroidism- stable on Synthroid 137 mcg daily      HLD- controlled on Lipitor     Septic arthritis of left wrist- followed by Ortho, s/p arthroscopy   -on IV Abx

## 2023-06-05 ENCOUNTER — OFFICE VISIT (OUTPATIENT)
Dept: INFECTIOUS DISEASES | Facility: CLINIC | Age: 48
End: 2023-06-05
Payer: COMMERCIAL

## 2023-06-05 VITALS
HEIGHT: 68 IN | HEART RATE: 75 BPM | SYSTOLIC BLOOD PRESSURE: 129 MMHG | WEIGHT: 158.94 LBS | BODY MASS INDEX: 24.09 KG/M2 | TEMPERATURE: 99 F | DIASTOLIC BLOOD PRESSURE: 74 MMHG

## 2023-06-05 DIAGNOSIS — M00.032 STAPHYLOCOCCAL ARTHRITIS OF LEFT WRIST: Primary | ICD-10-CM

## 2023-06-05 PROCEDURE — 3074F PR MOST RECENT SYSTOLIC BLOOD PRESSURE < 130 MM HG: ICD-10-PCS | Mod: CPTII,S$GLB,, | Performed by: INTERNAL MEDICINE

## 2023-06-05 PROCEDURE — 1159F MED LIST DOCD IN RCRD: CPT | Mod: CPTII,S$GLB,, | Performed by: INTERNAL MEDICINE

## 2023-06-05 PROCEDURE — 3008F BODY MASS INDEX DOCD: CPT | Mod: CPTII,S$GLB,, | Performed by: INTERNAL MEDICINE

## 2023-06-05 PROCEDURE — 3078F PR MOST RECENT DIASTOLIC BLOOD PRESSURE < 80 MM HG: ICD-10-PCS | Mod: CPTII,S$GLB,, | Performed by: INTERNAL MEDICINE

## 2023-06-05 PROCEDURE — 99213 PR OFFICE/OUTPT VISIT, EST, LEVL III, 20-29 MIN: ICD-10-PCS | Mod: S$GLB,,, | Performed by: INTERNAL MEDICINE

## 2023-06-05 PROCEDURE — 1111F PR DISCHARGE MEDS RECONCILED W/ CURRENT OUTPATIENT MED LIST: ICD-10-PCS | Mod: CPTII,S$GLB,, | Performed by: INTERNAL MEDICINE

## 2023-06-05 PROCEDURE — 1111F DSCHRG MED/CURRENT MED MERGE: CPT | Mod: CPTII,S$GLB,, | Performed by: INTERNAL MEDICINE

## 2023-06-05 PROCEDURE — 99999 PR PBB SHADOW E&M-EST. PATIENT-LVL III: ICD-10-PCS | Mod: PBBFAC,,, | Performed by: INTERNAL MEDICINE

## 2023-06-05 PROCEDURE — 99213 OFFICE O/P EST LOW 20 MIN: CPT | Mod: S$GLB,,, | Performed by: INTERNAL MEDICINE

## 2023-06-05 PROCEDURE — 99999 PR PBB SHADOW E&M-EST. PATIENT-LVL III: CPT | Mod: PBBFAC,,, | Performed by: INTERNAL MEDICINE

## 2023-06-05 PROCEDURE — 1160F RVW MEDS BY RX/DR IN RCRD: CPT | Mod: CPTII,S$GLB,, | Performed by: INTERNAL MEDICINE

## 2023-06-05 PROCEDURE — 1159F PR MEDICATION LIST DOCUMENTED IN MEDICAL RECORD: ICD-10-PCS | Mod: CPTII,S$GLB,, | Performed by: INTERNAL MEDICINE

## 2023-06-05 PROCEDURE — 3008F PR BODY MASS INDEX (BMI) DOCUMENTED: ICD-10-PCS | Mod: CPTII,S$GLB,, | Performed by: INTERNAL MEDICINE

## 2023-06-05 PROCEDURE — 3074F SYST BP LT 130 MM HG: CPT | Mod: CPTII,S$GLB,, | Performed by: INTERNAL MEDICINE

## 2023-06-05 PROCEDURE — 3078F DIAST BP <80 MM HG: CPT | Mod: CPTII,S$GLB,, | Performed by: INTERNAL MEDICINE

## 2023-06-05 PROCEDURE — 1160F PR REVIEW ALL MEDS BY PRESCRIBER/CLIN PHARMACIST DOCUMENTED: ICD-10-PCS | Mod: CPTII,S$GLB,, | Performed by: INTERNAL MEDICINE

## 2023-06-05 NOTE — PROGRESS NOTES
Subjective:      Patient ID: Srinivas Snyder Jr. is a 47 y.o. male.    Chief Complaint:Hospital Follow Up      History of Present Illness    47 year old man with recent MSSA septic left wrist. Joint fluid analysis from 5/23 showed WBC [id number] w/ 72% segs and culture (+) staph aureus. Patient returned to the ED for management of septic arthritis. CRP 63.2. Started on Rocephin and underwent I&D on 5/24. ID consulted for septic arthritis. MSSA on culture. Patient discharged on IV cefazolin. CRP normalized.    Review of Systems   Constitutional: Negative for chills and fever.   All other systems reviewed and are negative.  Objective:   Physical Exam  Vitals and nursing note reviewed.   Constitutional:       General: He is not in acute distress.     Appearance: Normal appearance. He is not ill-appearing, toxic-appearing or diaphoretic.   HENT:      Head: Normocephalic and atraumatic.      Right Ear: External ear normal.      Left Ear: External ear normal.      Mouth/Throat:      Mouth: Mucous membranes are dry.   Neurological:      Mental Status: He is alert.   Psychiatric:         Mood and Affect: Mood normal.         Behavior: Behavior normal.         Thought Content: Thought content normal.         Judgment: Judgment normal.   Assessment/Plan:     MSSA septic wrist, native  - improving  - finishing cefazolin on 6/22/23  - RTC as needed

## 2023-06-06 ENCOUNTER — INFUSION (OUTPATIENT)
Dept: INFUSION THERAPY | Facility: HOSPITAL | Age: 48
End: 2023-06-06
Attending: INTERNAL MEDICINE
Payer: COMMERCIAL

## 2023-06-06 DIAGNOSIS — M00.832 ARTHRITIS OF LEFT WRIST DUE TO OTHER BACTERIA: ICD-10-CM

## 2023-06-06 LAB
ALBUMIN SERPL BCP-MCNC: 4 G/DL (ref 3.5–5.2)
ALP SERPL-CCNC: 84 U/L (ref 55–135)
ALT SERPL W/O P-5'-P-CCNC: 10 U/L (ref 10–44)
ANION GAP SERPL CALC-SCNC: 10 MMOL/L (ref 8–16)
AST SERPL-CCNC: 28 U/L (ref 10–40)
BASOPHILS # BLD AUTO: 0.07 K/UL (ref 0–0.2)
BASOPHILS NFR BLD: 1 % (ref 0–1.9)
BILIRUB SERPL-MCNC: 0.3 MG/DL (ref 0.1–1)
BUN SERPL-MCNC: 13 MG/DL (ref 6–20)
CALCIUM SERPL-MCNC: 9.1 MG/DL (ref 8.7–10.5)
CHLORIDE SERPL-SCNC: 106 MMOL/L (ref 95–110)
CO2 SERPL-SCNC: 24 MMOL/L (ref 23–29)
CREAT SERPL-MCNC: 1.2 MG/DL (ref 0.5–1.4)
CRP SERPL-MCNC: 2.1 MG/L (ref 0–8.2)
DIFFERENTIAL METHOD: ABNORMAL
EOSINOPHIL # BLD AUTO: 0.4 K/UL (ref 0–0.5)
EOSINOPHIL NFR BLD: 5.8 % (ref 0–8)
ERYTHROCYTE [DISTWIDTH] IN BLOOD BY AUTOMATED COUNT: 12.4 % (ref 11.5–14.5)
EST. GFR  (NO RACE VARIABLE): >60 ML/MIN/1.73 M^2
GLUCOSE SERPL-MCNC: 134 MG/DL (ref 70–110)
HCT VFR BLD AUTO: 41.8 % (ref 40–54)
HGB BLD-MCNC: 13.5 G/DL (ref 14–18)
IMM GRANULOCYTES # BLD AUTO: 0.03 K/UL (ref 0–0.04)
IMM GRANULOCYTES NFR BLD AUTO: 0.4 % (ref 0–0.5)
LYMPHOCYTES # BLD AUTO: 1.4 K/UL (ref 1–4.8)
LYMPHOCYTES NFR BLD: 19.3 % (ref 18–48)
MCH RBC QN AUTO: 30 PG (ref 27–31)
MCHC RBC AUTO-ENTMCNC: 32.3 G/DL (ref 32–36)
MCV RBC AUTO: 93 FL (ref 82–98)
MONOCYTES # BLD AUTO: 0.5 K/UL (ref 0.3–1)
MONOCYTES NFR BLD: 6.6 % (ref 4–15)
NEUTROPHILS # BLD AUTO: 4.8 K/UL (ref 1.8–7.7)
NEUTROPHILS NFR BLD: 66.9 % (ref 38–73)
NRBC BLD-RTO: 0 /100 WBC
PLATELET # BLD AUTO: 213 K/UL (ref 150–450)
PMV BLD AUTO: 11.3 FL (ref 9.2–12.9)
POTASSIUM SERPL-SCNC: 4.1 MMOL/L (ref 3.5–5.1)
PROT SERPL-MCNC: 7.4 G/DL (ref 6–8.4)
RBC # BLD AUTO: 4.5 M/UL (ref 4.6–6.2)
SODIUM SERPL-SCNC: 140 MMOL/L (ref 136–145)
WBC # BLD AUTO: 7.1 K/UL (ref 3.9–12.7)

## 2023-06-06 PROCEDURE — 80053 COMPREHEN METABOLIC PANEL: CPT | Performed by: INTERNAL MEDICINE

## 2023-06-06 PROCEDURE — 85025 COMPLETE CBC W/AUTO DIFF WBC: CPT | Performed by: INTERNAL MEDICINE

## 2023-06-06 PROCEDURE — 86140 C-REACTIVE PROTEIN: CPT | Performed by: INTERNAL MEDICINE

## 2023-06-06 NOTE — PROGRESS NOTES
PICC dressing change provided to right UA utilizing PICC protocol.  Blood return through both ports. Chlorhexidine patch and sterile dressing applied. Site free from s/s of infection - no redness, no drainage, no edema.     Labs drawn: CBC, CMP, C-Reactive Protein     Pt tolerated well.  Time allotted for questions.

## 2023-06-06 NOTE — PLAN OF CARE
Problem: Infection  Goal: Absence of Infection Signs and Symptoms  Outcome: Ongoing, Progressing  Intervention: Prevent or Manage Infection  Flowsheets (Taken 6/6/2023 0968)  Infection Management: aseptic technique maintained  Isolation Precautions:   precautions initiated   precautions maintained

## 2023-06-09 ENCOUNTER — OFFICE VISIT (OUTPATIENT)
Dept: ORTHOPEDICS | Facility: CLINIC | Age: 48
End: 2023-06-09
Payer: COMMERCIAL

## 2023-06-09 DIAGNOSIS — M00.032 STAPHYLOCOCCAL ARTHRITIS OF LEFT WRIST: Primary | ICD-10-CM

## 2023-06-09 DIAGNOSIS — Z98.890 POSTOPERATIVE STATE: ICD-10-CM

## 2023-06-09 PROCEDURE — 99024 PR POST-OP FOLLOW-UP VISIT: ICD-10-PCS | Mod: S$GLB,,, | Performed by: PHYSICIAN ASSISTANT

## 2023-06-09 PROCEDURE — 1159F PR MEDICATION LIST DOCUMENTED IN MEDICAL RECORD: ICD-10-PCS | Mod: CPTII,S$GLB,, | Performed by: PHYSICIAN ASSISTANT

## 2023-06-09 PROCEDURE — 1159F MED LIST DOCD IN RCRD: CPT | Mod: CPTII,S$GLB,, | Performed by: PHYSICIAN ASSISTANT

## 2023-06-09 PROCEDURE — 99024 POSTOP FOLLOW-UP VISIT: CPT | Mod: S$GLB,,, | Performed by: PHYSICIAN ASSISTANT

## 2023-06-09 PROCEDURE — 99999 PR PBB SHADOW E&M-EST. PATIENT-LVL III: ICD-10-PCS | Mod: PBBFAC,,, | Performed by: PHYSICIAN ASSISTANT

## 2023-06-09 PROCEDURE — 99999 PR PBB SHADOW E&M-EST. PATIENT-LVL III: CPT | Mod: PBBFAC,,, | Performed by: PHYSICIAN ASSISTANT

## 2023-06-09 NOTE — PROGRESS NOTES
Dr. Dewitt is the supervising physician for this encounter/patient    Srinivas Snyder . presents for post-operative evaluation.  The patient is now 2 weeks s/p Left wrist arthroscopy and thorough irrigation and debridement, partial synovectomy with Dr. Dewitt on 5/24/23.  Overall the patient reports doing well.  He reports 0/10 pain, denies any f/c/s, no numbness. He does have some stiffness outside of the splint today.     Has been followed by ID, on IV cefazolin which is scheduled to be finished on 6/22/23.    PE:    AA&O x 4.  NAD  HEENT:  NCAT, sclera nonicteric  Lungs:  Respirations are equal and unlabored.  CV:  2+ bilateral upper and lower extremity pulses.  MSK: The wound is healing well with no signs of erythema or warmth.  There is no drainage.  No clinical signs or symptoms of infection are present.  Very mild edema present to the dorsal wrist. NTTP. Full hand motion present, decreased wrist motion. SILT. DNVI.    A/P: Status post above, doing well  1) Transitioned to wrist brace for comfort. OT ordered for postop rehab.  2) All sutures removed today. Wound care and signs of infection discussed.  3) F/U 2-3 weeks  4) Call with any questions/concerns in the interim      Jamie Russo-DiGeorge PA-C

## 2023-06-13 ENCOUNTER — INFUSION (OUTPATIENT)
Dept: INFUSION THERAPY | Facility: HOSPITAL | Age: 48
End: 2023-06-13
Attending: INTERNAL MEDICINE
Payer: COMMERCIAL

## 2023-06-13 DIAGNOSIS — M00.832 ARTHRITIS OF LEFT WRIST DUE TO OTHER BACTERIA: ICD-10-CM

## 2023-06-13 LAB
ALBUMIN SERPL BCP-MCNC: 4.1 G/DL (ref 3.5–5.2)
ALP SERPL-CCNC: 82 U/L (ref 55–135)
ALT SERPL W/O P-5'-P-CCNC: <5 U/L (ref 10–44)
ANION GAP SERPL CALC-SCNC: 8 MMOL/L (ref 8–16)
AST SERPL-CCNC: 18 U/L (ref 10–40)
BASOPHILS # BLD AUTO: 0.05 K/UL (ref 0–0.2)
BASOPHILS NFR BLD: 0.5 % (ref 0–1.9)
BILIRUB SERPL-MCNC: 0.2 MG/DL (ref 0.1–1)
BUN SERPL-MCNC: 10 MG/DL (ref 6–20)
CALCIUM SERPL-MCNC: 8.7 MG/DL (ref 8.7–10.5)
CHLORIDE SERPL-SCNC: 107 MMOL/L (ref 95–110)
CO2 SERPL-SCNC: 27 MMOL/L (ref 23–29)
CREAT SERPL-MCNC: 1.1 MG/DL (ref 0.5–1.4)
CRP SERPL-MCNC: 1.1 MG/L (ref 0–8.2)
DIFFERENTIAL METHOD: ABNORMAL
EOSINOPHIL # BLD AUTO: 0.3 K/UL (ref 0–0.5)
EOSINOPHIL NFR BLD: 3.3 % (ref 0–8)
ERYTHROCYTE [DISTWIDTH] IN BLOOD BY AUTOMATED COUNT: 12.5 % (ref 11.5–14.5)
EST. GFR  (NO RACE VARIABLE): >60 ML/MIN/1.73 M^2
GLUCOSE SERPL-MCNC: 89 MG/DL (ref 70–110)
HCT VFR BLD AUTO: 39.1 % (ref 40–54)
HGB BLD-MCNC: 13.1 G/DL (ref 14–18)
IMM GRANULOCYTES # BLD AUTO: 0.02 K/UL (ref 0–0.04)
IMM GRANULOCYTES NFR BLD AUTO: 0.2 % (ref 0–0.5)
LYMPHOCYTES # BLD AUTO: 1 K/UL (ref 1–4.8)
LYMPHOCYTES NFR BLD: 9.7 % (ref 18–48)
MCH RBC QN AUTO: 30.8 PG (ref 27–31)
MCHC RBC AUTO-ENTMCNC: 33.5 G/DL (ref 32–36)
MCV RBC AUTO: 92 FL (ref 82–98)
MONOCYTES # BLD AUTO: 0.6 K/UL (ref 0.3–1)
MONOCYTES NFR BLD: 6 % (ref 4–15)
NEUTROPHILS # BLD AUTO: 8.1 K/UL (ref 1.8–7.7)
NEUTROPHILS NFR BLD: 80.3 % (ref 38–73)
NRBC BLD-RTO: 0 /100 WBC
PLATELET # BLD AUTO: 180 K/UL (ref 150–450)
PMV BLD AUTO: 11.7 FL (ref 9.2–12.9)
POTASSIUM SERPL-SCNC: 3.9 MMOL/L (ref 3.5–5.1)
PROT SERPL-MCNC: 7.2 G/DL (ref 6–8.4)
RBC # BLD AUTO: 4.26 M/UL (ref 4.6–6.2)
SODIUM SERPL-SCNC: 142 MMOL/L (ref 136–145)
WBC # BLD AUTO: 10.11 K/UL (ref 3.9–12.7)

## 2023-06-13 PROCEDURE — 80053 COMPREHEN METABOLIC PANEL: CPT | Performed by: INTERNAL MEDICINE

## 2023-06-13 PROCEDURE — 85025 COMPLETE CBC W/AUTO DIFF WBC: CPT | Performed by: INTERNAL MEDICINE

## 2023-06-13 PROCEDURE — 86140 C-REACTIVE PROTEIN: CPT | Performed by: INTERNAL MEDICINE

## 2023-06-13 NOTE — PROGRESS NOTES
PICC dressing change provided to right UA utilizing PICC protocol.  Blood return through both ports. Chlorhexidine patch and sterile dressing applied. Site free from s/s of infection - no redness, no drainage, no edema.     Labs drawn: CBC, CMP, C-Reactive Protein     Pt tolerated well.  Time allotted for questions. Return appt provided.

## 2023-06-13 NOTE — PLAN OF CARE
Problem: Infection  Goal: Absence of Infection Signs and Symptoms  Outcome: Ongoing, Progressing  Intervention: Prevent or Manage Infection  Flowsheets (Taken 6/13/2023 1004)  Infection Management: aseptic technique maintained  Isolation Precautions:   precautions initiated   precautions maintained

## 2023-06-14 ENCOUNTER — TELEPHONE (OUTPATIENT)
Dept: INTERNAL MEDICINE | Facility: CLINIC | Age: 48
End: 2023-06-14
Payer: COMMERCIAL

## 2023-06-14 DIAGNOSIS — Z00.00 ANNUAL PHYSICAL EXAM: ICD-10-CM

## 2023-06-14 DIAGNOSIS — E03.4 HYPOTHYROIDISM DUE TO ACQUIRED ATROPHY OF THYROID: Primary | ICD-10-CM

## 2023-06-19 ENCOUNTER — INFUSION (OUTPATIENT)
Dept: INFUSION THERAPY | Facility: HOSPITAL | Age: 48
End: 2023-06-19
Attending: INTERNAL MEDICINE
Payer: COMMERCIAL

## 2023-06-19 ENCOUNTER — OFFICE VISIT (OUTPATIENT)
Dept: INFECTIOUS DISEASES | Facility: CLINIC | Age: 48
End: 2023-06-19
Payer: COMMERCIAL

## 2023-06-19 VITALS
TEMPERATURE: 98 F | HEART RATE: 58 BPM | SYSTOLIC BLOOD PRESSURE: 111 MMHG | BODY MASS INDEX: 23.76 KG/M2 | HEIGHT: 68 IN | DIASTOLIC BLOOD PRESSURE: 74 MMHG | WEIGHT: 156.75 LBS

## 2023-06-19 DIAGNOSIS — M00.832 ARTHRITIS OF LEFT WRIST DUE TO OTHER BACTERIA: Primary | ICD-10-CM

## 2023-06-19 DIAGNOSIS — M00.032 STAPHYLOCOCCAL ARTHRITIS OF LEFT WRIST: Primary | ICD-10-CM

## 2023-06-19 LAB
ALBUMIN SERPL BCP-MCNC: 4 G/DL (ref 3.5–5.2)
ALP SERPL-CCNC: 80 U/L (ref 55–135)
ALT SERPL W/O P-5'-P-CCNC: <5 U/L (ref 10–44)
ANION GAP SERPL CALC-SCNC: 14 MMOL/L (ref 8–16)
AST SERPL-CCNC: 18 U/L (ref 10–40)
BASOPHILS # BLD AUTO: 0.04 K/UL (ref 0–0.2)
BASOPHILS NFR BLD: 0.8 % (ref 0–1.9)
BILIRUB SERPL-MCNC: 0.6 MG/DL (ref 0.1–1)
BUN SERPL-MCNC: 14 MG/DL (ref 6–20)
CALCIUM SERPL-MCNC: 9.1 MG/DL (ref 8.7–10.5)
CHLORIDE SERPL-SCNC: 104 MMOL/L (ref 95–110)
CO2 SERPL-SCNC: 23 MMOL/L (ref 23–29)
CREAT SERPL-MCNC: 1.2 MG/DL (ref 0.5–1.4)
CRP SERPL-MCNC: 1.7 MG/L (ref 0–8.2)
DIFFERENTIAL METHOD: ABNORMAL
EOSINOPHIL # BLD AUTO: 0.3 K/UL (ref 0–0.5)
EOSINOPHIL NFR BLD: 6.7 % (ref 0–8)
ERYTHROCYTE [DISTWIDTH] IN BLOOD BY AUTOMATED COUNT: 12.5 % (ref 11.5–14.5)
EST. GFR  (NO RACE VARIABLE): >60 ML/MIN/1.73 M^2
GLUCOSE SERPL-MCNC: 123 MG/DL (ref 70–110)
HCT VFR BLD AUTO: 41 % (ref 40–54)
HGB BLD-MCNC: 13.3 G/DL (ref 14–18)
IMM GRANULOCYTES # BLD AUTO: 0.02 K/UL (ref 0–0.04)
IMM GRANULOCYTES NFR BLD AUTO: 0.4 % (ref 0–0.5)
LYMPHOCYTES # BLD AUTO: 1.1 K/UL (ref 1–4.8)
LYMPHOCYTES NFR BLD: 21.7 % (ref 18–48)
MCH RBC QN AUTO: 30.8 PG (ref 27–31)
MCHC RBC AUTO-ENTMCNC: 32.4 G/DL (ref 32–36)
MCV RBC AUTO: 95 FL (ref 82–98)
MONOCYTES # BLD AUTO: 0.4 K/UL (ref 0.3–1)
MONOCYTES NFR BLD: 7 % (ref 4–15)
NEUTROPHILS # BLD AUTO: 3.2 K/UL (ref 1.8–7.7)
NEUTROPHILS NFR BLD: 63.4 % (ref 38–73)
NRBC BLD-RTO: 0 /100 WBC
PLATELET # BLD AUTO: 165 K/UL (ref 150–450)
PMV BLD AUTO: 11.3 FL (ref 9.2–12.9)
POTASSIUM SERPL-SCNC: 3.5 MMOL/L (ref 3.5–5.1)
PROT SERPL-MCNC: 7.3 G/DL (ref 6–8.4)
RBC # BLD AUTO: 4.32 M/UL (ref 4.6–6.2)
SODIUM SERPL-SCNC: 141 MMOL/L (ref 136–145)
WBC # BLD AUTO: 5.11 K/UL (ref 3.9–12.7)

## 2023-06-19 PROCEDURE — 1160F RVW MEDS BY RX/DR IN RCRD: CPT | Mod: CPTII,S$GLB,, | Performed by: INTERNAL MEDICINE

## 2023-06-19 PROCEDURE — 80053 COMPREHEN METABOLIC PANEL: CPT | Performed by: INTERNAL MEDICINE

## 2023-06-19 PROCEDURE — 1111F PR DISCHARGE MEDS RECONCILED W/ CURRENT OUTPATIENT MED LIST: ICD-10-PCS | Mod: CPTII,S$GLB,, | Performed by: INTERNAL MEDICINE

## 2023-06-19 PROCEDURE — 3008F BODY MASS INDEX DOCD: CPT | Mod: CPTII,S$GLB,, | Performed by: INTERNAL MEDICINE

## 2023-06-19 PROCEDURE — 85025 COMPLETE CBC W/AUTO DIFF WBC: CPT | Performed by: INTERNAL MEDICINE

## 2023-06-19 PROCEDURE — 86140 C-REACTIVE PROTEIN: CPT | Performed by: INTERNAL MEDICINE

## 2023-06-19 PROCEDURE — 99213 OFFICE O/P EST LOW 20 MIN: CPT | Mod: S$GLB,,, | Performed by: INTERNAL MEDICINE

## 2023-06-19 PROCEDURE — 3078F PR MOST RECENT DIASTOLIC BLOOD PRESSURE < 80 MM HG: ICD-10-PCS | Mod: CPTII,S$GLB,, | Performed by: INTERNAL MEDICINE

## 2023-06-19 PROCEDURE — 3074F SYST BP LT 130 MM HG: CPT | Mod: CPTII,S$GLB,, | Performed by: INTERNAL MEDICINE

## 2023-06-19 PROCEDURE — 1160F PR REVIEW ALL MEDS BY PRESCRIBER/CLIN PHARMACIST DOCUMENTED: ICD-10-PCS | Mod: CPTII,S$GLB,, | Performed by: INTERNAL MEDICINE

## 2023-06-19 PROCEDURE — 3074F PR MOST RECENT SYSTOLIC BLOOD PRESSURE < 130 MM HG: ICD-10-PCS | Mod: CPTII,S$GLB,, | Performed by: INTERNAL MEDICINE

## 2023-06-19 PROCEDURE — 3078F DIAST BP <80 MM HG: CPT | Mod: CPTII,S$GLB,, | Performed by: INTERNAL MEDICINE

## 2023-06-19 PROCEDURE — 99213 PR OFFICE/OUTPT VISIT, EST, LEVL III, 20-29 MIN: ICD-10-PCS | Mod: S$GLB,,, | Performed by: INTERNAL MEDICINE

## 2023-06-19 PROCEDURE — 1159F PR MEDICATION LIST DOCUMENTED IN MEDICAL RECORD: ICD-10-PCS | Mod: CPTII,S$GLB,, | Performed by: INTERNAL MEDICINE

## 2023-06-19 PROCEDURE — 1159F MED LIST DOCD IN RCRD: CPT | Mod: CPTII,S$GLB,, | Performed by: INTERNAL MEDICINE

## 2023-06-19 PROCEDURE — 99999 PR PBB SHADOW E&M-EST. PATIENT-LVL IV: ICD-10-PCS | Mod: PBBFAC,,, | Performed by: INTERNAL MEDICINE

## 2023-06-19 PROCEDURE — 99999 PR PBB SHADOW E&M-EST. PATIENT-LVL IV: CPT | Mod: PBBFAC,,, | Performed by: INTERNAL MEDICINE

## 2023-06-19 PROCEDURE — 1111F DSCHRG MED/CURRENT MED MERGE: CPT | Mod: CPTII,S$GLB,, | Performed by: INTERNAL MEDICINE

## 2023-06-19 PROCEDURE — 3008F PR BODY MASS INDEX (BMI) DOCUMENTED: ICD-10-PCS | Mod: CPTII,S$GLB,, | Performed by: INTERNAL MEDICINE

## 2023-06-19 RX ORDER — CEPHALEXIN 500 MG/1
500 CAPSULE ORAL EVERY 8 HOURS
Qty: 21 CAPSULE | Refills: 0 | Status: SHIPPED | OUTPATIENT
Start: 2023-06-19 | End: 2023-06-26

## 2023-06-19 NOTE — PROGRESS NOTES
[facility] Occupational Therapy  Initial Evaluation     Date: 6/20/2023  Patient: Srinivas Snyder Jr.  Chart Number: [id number]  Referring Physician: Russo-Digeorge, Jamie L*  Therapy Diagnosis:   1. Staphylococcal arthritis of left wrist  Ambulatory referral/consult to Physical/Occupational Therapy      2. Postoperative state  Ambulatory referral/consult to Physical/Occupational Therapy      3. Left wrist pain            Medical Diagnosis:   M00.032 (ICD-10-CM) - Staphylococcal arthritis of left wrist   Z98.890 (ICD-10-CM) - Postoperative state     Physician Orders: eval and treat  Evaluation Date: 6/20/2023  Plan of Care Certification Date: 9/12/2023  Authorization Period: 12/29/2023  Surgery Date and Procedure: 5/24/2023  Date of Return to MD: 6/27/2023    Visit #: 1 of 1  Time In: 11:18  Time Out: 11:55  Total Billable Time: 37    Precautions: Standard    Subjective     Involved Side: Left  Dominant Side: Right  Date of Onset: about 5 weeks ago  History of Current Condition: Srinivas Snyder JrSmita presents for post-operative evaluation.  The patient is now 2 weeks s/p Left wrist arthroscopy and thorough irrigation and debridement, partial synovectomy with Dr. Dewitt on 5/24/23.  Imaging: No fracture or dislocation.  Unremarkable visualized bony structures.  Previous Therapy: None    Patient's Goals for Therapy: Regain motion     Pain:  Functional Pain Scale Rating 0-10:   1/10 on average  1/10 at best  1/10 at worst  Location: dorsal wrist  Description: tightness, achy  Aggravating Factors: typing, holding cell phone  Easing Factors: taking breaks    Occupation:  [employer]   Working presently: employed  Duties: Computer skills    Functional Limitations/Social History:    Previous functional status includes: Independent with all ADLs.     Current Functional Status   Home/Living environment: lives with their spouse      Limitation of Functional Status as follows:   ADLs/IADLs:      - Feeding: Independent    - Bathing: Independent    - Dressing/Grooming: Independent    - Home Management: Independent    - Driving: Independent      Past Medical History/Physical Systems Review:   Srinivas Snyder Jr.  has a past medical history of Anxiety, Hyperlipidemia, and Hypothyroidism.    Srinivas Snyder Jr.  has a past surgical history that includes Arthroscopic debridement of wrist (Left, 5/24/2023).    Srinivas has a current medication list which includes the following prescription(s): atorvastatin, cephalexin, ibuprofen, indomethacin, levothyroxine, [START ON 7/30/2023] lisdexamfetamine, [START ON 6/30/2023] lisdexamfetamine, lisdexamfetamine, and tramadol.    Review of patient's allergies indicates:  No Known Allergies       Objective     Mental status: alert, oriented x3    Observation: Incisions CDI.    Sensation: Intact    Edema: Circumferential measurements: In centimters     Left Right   PWC (Proximal Wrist Crease) 18.2  17.0      Range of Motion:   Left    Elbow and Wrist ROM. Measured in degrees.   6/20/2023 6/20/2023      Left Right     Supination full full     Pronation full full     Wrist Ext 60 35     Wrist Flex 65 25     Wrist RD 9 9     Wrist UD 40 15       Hand ROM. Measured in degrees.   6/20/2023      Left           Index: MP  0/69                PIP     0/83                DIP 0/34           Long:  MP 0/81                PIP 0/81                DIP 0/43           Ring:   MP 0/84                PIP 0/81                DIP 0/36           Small:  MP 0/90                 PIP 0/80                 DIP 0/38          Strength (Dynamometer) and Pinch Strength (Pinch Gauge)  Measured in pounds.   6/20/2023 6/20/2023      Left Right     Rung II 27.1* 88.9     *Point of Pain    FOTO not administered this session 2/2 not in the system.    Treatment     Treatment Time In: 11:45  Treatment Time Out: 11:55  Total Treatment time separate from Evaluation time:Brad dennis  in dynamic functional therapeutic activities to improve functional performance for 10  minutes, including:  -HEP training  -TGE 10x  -Sponge squeeze 1' to reduce swelling    Home Exercise Program/Education:  Issued HEP (see patient instructions in EMR) and educated on modality use for pain management . Exercises were reviewed and Dong was able to demonstrate them prior to the end of the session.   Pt received a written copy of exercises to perform at home. Dong demonstrated good  understanding of the education provided.  Pt was advised to perform these exercises free of pain, and to stop performing them if pain occurs.    Patient/Family Education: role of OT, goals for OT, scheduling/cancellations - pt verbalized understanding. Discussed insurance limitations with patient.    Additional Education provided: Use of heat, no heavy lifting, anatomy and physiology of condition      Assessment     Srinivas Snyder Jr. is a 47 y.o. male presents with limitations as described in problem list. Patient can benefit from Occupational Therapy services for Iontophoresis, ultrasound, moist heat, therapeutic exercises, home exercise program provied with written instructions, ice and strengthening and orthotics, if deemed necessary . The following goals were discussed with the patient and she is in agreement with them as to be addressed in the treatment plan.    The patient's rehab potential is Good.    Anticipated barriers to occupational therapy: Nature of injury  Pt has no cultural, educational or language barriers to learning provided.    Profile and History Assessment of Occupational Performance Level of Clinical Decision Making Complexity Score   Occupational Profile:   Srinivas Snyder Jr. is a 47 y.o. male who is currently employed Srinivas Snyder Jr. has difficulty with  ADLs and IADLs as listed previously, which  affecting his/her daily functional abilities.      Comorbidities:    has a past medical  history of Anxiety, Hyperlipidemia, and Hypothyroidism.    Medical and Therapy History Review:   Brief               Performance Deficits    Physical:  Joint Mobility  Joint Stability  Muscle Power/Strength   Strength  Pinch Strength  Pain    Cognitive:  No Deficits    Psychosocial:    No Deficits     Clinical Decision Making:  low    Modification/Need for Assistance:  Not Necessary    Intervention Selection:  Limited Treatment Options       low  Based on PMHX, co morbidities , data from assessments and functional level of assistance required with task and clinical presentation directly impacting function.         Goals:    LTG's (12 weeks):  1)   Increase ROM to WFL composite fist degrees to increase functional hand use for manipulating guitar strings.  2)   Increase ROM by 50 degrees in wrist flexion/extension to increase functional hand use for dressing skills.  3)   Increase  strength by 50 lbs. to grasp pot handle.  4)   Decrease complaints of pain to   0  out of 10 at worst to increase functional hand use for ADL/work/leisure activities.  5)   Pt will return to near to prior level of function for ADLs and household management reporting I or Mod I with ADLs (dressing, feeding, grooming, toileting).     STG's (6 weeks)  1)   Patient to be IND with HEP and modalities for pain/edema managment.  2)   Increase ROM by 30 degrees in WF/WE to increase functional hand use for ADLs/work/leisure activities.  3)   Increase  strength by 25 lbs. to improve functional grasp for ADLs/work/leisure activities.     Plan     Pt to be treated by Occupational Therapy 2 times per week for 12 weeks during the certification period from 6/20/2023 to 9/12/2023 to achieve the established goals.     Treatment to include: Paraffin, Fluidotherapy, Manual therapy/joint mobilizations, Modalities for pain management, US 3 mhz, Therapeutic exercises/activities., Iontophoresis with 2.0 cc Dexamethasone, Strengthening, Scar Management,  and Joint Protection, as well as any other treatments deemed necessary based on the patient's needs or progress.     Mila Ayon, OTR/L, [facility]  Occupational Therapist

## 2023-06-19 NOTE — PROGRESS NOTES
Patient presents to infusion center for PICC line removal and labs.   PICC line removed from right upper arm. Catheter tip visualized and intact, measuring  37 cm.  Pressure dressing applied with vaseline occlusive dressing , 2x2 gauze and secured with coban.  No redness, ecchymosis, edema, swelling, or drainage noted at site. Pt. instructed to leave dressing in place for 24 hours, not to immerse in water for three days or until insertion site closes, verbalized understanding.  Observed patient for 30 minutes with no signs of bleeding to right upper arm.  Labs collected: CBC, CMP, CRP per [facility] orders. Tolerated well and left unit in NAD.

## 2023-06-19 NOTE — PLAN OF CARE
Problem: Infection  Goal: Absence of Infection Signs and Symptoms  Outcome: Ongoing, Progressing  Intervention: Prevent or Manage Infection  Flowsheets (Taken 6/19/2023 1216)  Infection Management: aseptic technique maintained

## 2023-06-19 NOTE — PROGRESS NOTES
Subjective:      Patient ID: Srinivas Snyder Jr. is a 47 y.o. male.    Chief Complaint:Follow-up      History of Present Illness    Mr. Snyder is a nice 46 yo man on IV abx for MSSA left septic wrist. Etiology was never found. Nevertheless, he is due to complete IV abx on Wednesday.  Doing well. No complaints.    Review of Systems   Constitutional: Negative for fever.   Musculoskeletal:  Negative for arthritis, joint pain and joint swelling.   All other systems reviewed and are negative.  Objective:   Physical Exam  Constitutional:       Appearance: Normal appearance.   Musculoskeletal:         General: No swelling, tenderness, deformity or signs of injury. Normal range of motion.   Neurological:      General: No focal deficit present.      Mental Status: He is alert.   Psychiatric:         Mood and Affect: Mood normal.         Behavior: Behavior normal.         Thought Content: Thought content normal.         Judgment: Judgment normal.       Assessment/Plan:        1. Staphylococcal arthritis of left wrist      Resolved. Ending IV abx a few days early - will cover with po Kelfex.  Remove PICC. RTC as needed.

## 2023-06-20 ENCOUNTER — CLINICAL SUPPORT (OUTPATIENT)
Dept: REHABILITATION | Facility: HOSPITAL | Age: 48
End: 2023-06-20
Payer: COMMERCIAL

## 2023-06-20 DIAGNOSIS — Z98.890 POSTOPERATIVE STATE: ICD-10-CM

## 2023-06-20 DIAGNOSIS — M25.532 LEFT WRIST PAIN: ICD-10-CM

## 2023-06-20 DIAGNOSIS — M00.032 STAPHYLOCOCCAL ARTHRITIS OF LEFT WRIST: ICD-10-CM

## 2023-06-20 PROCEDURE — 97165 OT EVAL LOW COMPLEX 30 MIN: CPT | Mod: PO

## 2023-06-20 PROCEDURE — 97530 THERAPEUTIC ACTIVITIES: CPT | Mod: PO

## 2023-06-20 NOTE — PATIENT INSTRUCTIONS
AROM: Forearm Pronation / Supination        With arm in handshake position, slowly rotate palm down until stretch is felt. Relax. Then rotate palm up until stretch is felt.  Repeat 20 times. Do 5 sessions per day.          Extension (Active With Finger Extension)        With forearm on table and wrist over edge, lift hand with fingers straight.   Repeat 20 times. Do 5 sessions per day.    Radial / Ulnar Deviation (Assistive)        Move hand side to side like a windshield wiper. Do not move elbow.  Repeat 20 times. Do 5 sessions per day.    Circumduction (Active)        With fingers curled, move slowly at wrist in clock- wise circles 20 times. Repeat counterclockwise. Do not move elbow or shoulder.  Do 5 sessions per day.      Tendon Glides         Start at position A and move through each position slowly attempting to achieve full glide.  A-E is ONE repetition.     Complete 10 reps 3 times per day.     Sponge gripping for swelling      Squeeze putty using thumb and all fingers.  Squeeze gently for 1 minute intervals 3-4 times/day.

## 2023-06-20 NOTE — PLAN OF CARE
[facility] Occupational Therapy  Initial Evaluation     Date: 6/20/2023  Patient: Srinivas Snyder Jr.  Chart Number: [id number]  Referring Physician: Russo-Digeorge, Jamie L*  Therapy Diagnosis:   1. Staphylococcal arthritis of left wrist  Ambulatory referral/consult to Physical/Occupational Therapy      2. Postoperative state  Ambulatory referral/consult to Physical/Occupational Therapy      3. Left wrist pain            Medical Diagnosis:   M00.032 (ICD-10-CM) - Staphylococcal arthritis of left wrist   Z98.890 (ICD-10-CM) - Postoperative state     Physician Orders: eval and treat  Evaluation Date: 6/20/2023  Plan of Care Certification Date: 9/12/2023  Authorization Period: 12/29/2023  Surgery Date and Procedure: 5/24/2023  Date of Return to MD: 6/27/2023    Visit #: 1 of 1  Time In: 11:18  Time Out: 11:55  Total Billable Time: 37    Precautions: Standard    Subjective     Involved Side: Left  Dominant Side: Right  Date of Onset: about 5 weeks ago  History of Current Condition: Srinivas Snyder JrSmita presents for post-operative evaluation.  The patient is now 2 weeks s/p Left wrist arthroscopy and thorough irrigation and debridement, partial synovectomy with Dr. Dewitt on 5/24/23.  Imaging: No fracture or dislocation.  Unremarkable visualized bony structures.  Previous Therapy: None    Patient's Goals for Therapy: Regain motion     Pain:  Functional Pain Scale Rating 0-10:   1/10 on average  1/10 at best  1/10 at worst  Location: dorsal wrist  Description: tightness, achy  Aggravating Factors: typing, holding cell phone  Easing Factors: taking breaks    Occupation:  [employer]   Working presently: employed  Duties: Computer skills    Functional Limitations/Social History:    Previous functional status includes: Independent with all ADLs.     Current Functional Status   Home/Living environment: lives with their spouse      Limitation of Functional Status as follows:   ADLs/IADLs:      - Feeding: Independent    - Bathing: Independent    - Dressing/Grooming: Independent    - Home Management: Independent    - Driving: Independent      Past Medical History/Physical Systems Review:   Srinivas Snyder Jr.  has a past medical history of Anxiety, Hyperlipidemia, and Hypothyroidism.    Srinivas Snyder Jr.  has a past surgical history that includes Arthroscopic debridement of wrist (Left, 5/24/2023).    Srinivas has a current medication list which includes the following prescription(s): atorvastatin, cephalexin, ibuprofen, indomethacin, levothyroxine, [START ON 7/30/2023] lisdexamfetamine, [START ON 6/30/2023] lisdexamfetamine, lisdexamfetamine, and tramadol.    Review of patient's allergies indicates:  No Known Allergies       Objective     Mental status: alert, oriented x3    Observation: Incisions CDI.    Sensation: Intact    Edema: Circumferential measurements: In centimters     Left Right   PWC (Proximal Wrist Crease) 18.2  17.0      Range of Motion:   Left    Elbow and Wrist ROM. Measured in degrees.   6/20/2023 6/20/2023      Left Right     Supination full full     Pronation full full     Wrist Ext 60 35     Wrist Flex 65 25     Wrist RD 9 9     Wrist UD 40 15       Hand ROM. Measured in degrees.   6/20/2023      Left           Index: MP  0/69                PIP     0/83                DIP 0/34           Long:  MP 0/81                PIP 0/81                DIP 0/43           Ring:   MP 0/84                PIP 0/81                DIP 0/36           Small:  MP 0/90                 PIP 0/80                 DIP 0/38          Strength (Dynamometer) and Pinch Strength (Pinch Gauge)  Measured in pounds.   6/20/2023 6/20/2023      Left Right     Rung II 27.1* 88.9     *Point of Pain    FOTO not administered this session 2/2 not in the system.    Treatment     Treatment Time In: 11:45  Treatment Time Out: 11:55  Total Treatment time separate from Evaluation time:Brad dennis  in dynamic functional therapeutic activities to improve functional performance for 10  minutes, including:  -HEP training  -TGE 10x  -Sponge squeeze 1' to reduce swelling    Home Exercise Program/Education:  Issued HEP (see patient instructions in EMR) and educated on modality use for pain management . Exercises were reviewed and Dong was able to demonstrate them prior to the end of the session.   Pt received a written copy of exercises to perform at home. Dong demonstrated good  understanding of the education provided.  Pt was advised to perform these exercises free of pain, and to stop performing them if pain occurs.    Patient/Family Education: role of OT, goals for OT, scheduling/cancellations - pt verbalized understanding. Discussed insurance limitations with patient.    Additional Education provided: Use of heat, no heavy lifting, anatomy and physiology of condition      Assessment     Srinivas Snyder Jr. is a 47 y.o. male presents with limitations as described in problem list. Patient can benefit from Occupational Therapy services for Iontophoresis, ultrasound, moist heat, therapeutic exercises, home exercise program provied with written instructions, ice and strengthening and orthotics, if deemed necessary . The following goals were discussed with the patient and she is in agreement with them as to be addressed in the treatment plan.    The patient's rehab potential is Good.    Anticipated barriers to occupational therapy: Nature of injury  Pt has no cultural, educational or language barriers to learning provided.    Profile and History Assessment of Occupational Performance Level of Clinical Decision Making Complexity Score   Occupational Profile:   Srinivas Snyder Jr. is a 47 y.o. male who is currently employed Srinivas Snyder Jr. has difficulty with  ADLs and IADLs as listed previously, which  affecting his/her daily functional abilities.      Comorbidities:    has a past medical  history of Anxiety, Hyperlipidemia, and Hypothyroidism.    Medical and Therapy History Review:   Brief               Performance Deficits    Physical:  Joint Mobility  Joint Stability  Muscle Power/Strength   Strength  Pinch Strength  Pain    Cognitive:  No Deficits    Psychosocial:    No Deficits     Clinical Decision Making:  low    Modification/Need for Assistance:  Not Necessary    Intervention Selection:  Limited Treatment Options       low  Based on PMHX, co morbidities , data from assessments and functional level of assistance required with task and clinical presentation directly impacting function.         Goals:    LTG's (12 weeks):  1)   Increase ROM to WFL composite fist degrees to increase functional hand use for manipulating guitar strings.  2)   Increase ROM by 50 degrees in wrist flexion/extension to increase functional hand use for dressing skills.  3)   Increase  strength by 50 lbs. to grasp pot handle.  4)   Decrease complaints of pain to  0 out of 10 at worst to increase functional hand use for ADL/work/leisure activities.  5)   Pt will return to near to prior level of function for ADLs and household management reporting I or Mod I with ADLs (dressing, feeding, grooming, toileting).     STG's (6 weeks)  1)   Patient to be IND with HEP and modalities for pain/edema managment.  2)   Increase ROM by 30 degrees in WF/WE to increase functional hand use for ADLs/work/leisure activities.  3)   Increase  strength by 25 lbs. to improve functional grasp for ADLs/work/leisure activities.     Plan     Pt to be treated by Occupational Therapy 2 times per week for 12 weeks during the certification period from 6/20/2023 to 9/12/2023 to achieve the established goals.     Treatment to include: Paraffin, Fluidotherapy, Manual therapy/joint mobilizations, Modalities for pain management, US 3 mhz, Therapeutic exercises/activities., Iontophoresis with 2.0 cc Dexamethasone, Strengthening, Scar Management,  and Joint Protection, as well as any other treatments deemed necessary based on the patient's needs or progress.     Mila Ayon, OTR/L, [facility]  Occupational Therapist

## 2023-06-22 ENCOUNTER — PATIENT MESSAGE (OUTPATIENT)
Dept: INFECTIOUS DISEASES | Facility: CLINIC | Age: 48
End: 2023-06-22
Payer: COMMERCIAL

## 2023-06-27 ENCOUNTER — OFFICE VISIT (OUTPATIENT)
Dept: ORTHOPEDICS | Facility: CLINIC | Age: 48
End: 2023-06-27
Payer: COMMERCIAL

## 2023-06-27 DIAGNOSIS — M00.032 STAPHYLOCOCCAL ARTHRITIS OF LEFT WRIST: Primary | ICD-10-CM

## 2023-06-27 DIAGNOSIS — Z98.890 POSTOPERATIVE STATE: ICD-10-CM

## 2023-06-27 PROCEDURE — 99024 PR POST-OP FOLLOW-UP VISIT: ICD-10-PCS | Mod: S$GLB,,, | Performed by: PHYSICIAN ASSISTANT

## 2023-06-27 PROCEDURE — 99999 PR PBB SHADOW E&M-EST. PATIENT-LVL III: ICD-10-PCS | Mod: PBBFAC,,, | Performed by: PHYSICIAN ASSISTANT

## 2023-06-27 PROCEDURE — 99999 PR PBB SHADOW E&M-EST. PATIENT-LVL III: CPT | Mod: PBBFAC,,, | Performed by: PHYSICIAN ASSISTANT

## 2023-06-27 PROCEDURE — 99024 POSTOP FOLLOW-UP VISIT: CPT | Mod: S$GLB,,, | Performed by: PHYSICIAN ASSISTANT

## 2023-06-27 PROCEDURE — 1159F MED LIST DOCD IN RCRD: CPT | Mod: CPTII,S$GLB,, | Performed by: PHYSICIAN ASSISTANT

## 2023-06-27 PROCEDURE — 1159F PR MEDICATION LIST DOCUMENTED IN MEDICAL RECORD: ICD-10-PCS | Mod: CPTII,S$GLB,, | Performed by: PHYSICIAN ASSISTANT

## 2023-06-27 NOTE — PROGRESS NOTES
Dr. Dewitt is the supervising physician for this encounter/patient    Srinivas Snyder . presents for post-operative evaluation.  The patient is now 5 weeks s/p Left wrist arthroscopy and thorough irrigation and debridement, partial synovectomy with Dr. Dewitt on 5/24/23.  Overall the patient reports doing well.  He reports 0/10 pain, denies any f/c/s, no numbness. He is working with OT and finds his function is good.     Has been followed by ID, PICC was discontinued on 6/19/23, he was covered with PO Keflex until 6/22/23. Currently he is not taking any medications for this. ID discharged him.    PE:    AA&O x 4.  NAD  HEENT:  NCAT, sclera nonicteric  Lungs:  Respirations are equal and unlabored.  CV:  2+ bilateral upper and lower extremity pulses.  MSK: The wounds are well healed without any concern for infection.  Very mild edema present to the dorsal wrist. NTTP. Full hand motion present, decreased wrist motion. SILT. DNVI.    A/P: Status post above, doing well  1) OT for postop rehab, activity as tolerated.  2) NSAIDs, Ice/heat prn.  3) F/U as needed.  4) Call with any questions/concerns in the interim      Jamie Russo-DiGeorge PA-C

## 2023-07-11 ENCOUNTER — CLINICAL SUPPORT (OUTPATIENT)
Dept: REHABILITATION | Facility: HOSPITAL | Age: 48
End: 2023-07-11
Payer: COMMERCIAL

## 2023-07-11 DIAGNOSIS — M25.532 LEFT WRIST PAIN: Primary | ICD-10-CM

## 2023-07-11 PROCEDURE — 97530 THERAPEUTIC ACTIVITIES: CPT | Mod: PO,CO

## 2023-07-11 PROCEDURE — 97110 THERAPEUTIC EXERCISES: CPT | Mod: PO,CO

## 2023-07-11 PROCEDURE — 97140 MANUAL THERAPY 1/> REGIONS: CPT | Mod: PO,CO

## 2023-07-11 PROCEDURE — 97022 WHIRLPOOL THERAPY: CPT | Mod: PO,CO

## 2023-07-11 NOTE — PROGRESS NOTES
"  [facility]  Occupational Therapy Treatment Note     Date: 7/11/2023  Name: Srinivas Snyder Jr.  Clinic Number: [id number]    Therapy Diagnosis:   Encounter Diagnosis   Name Primary?    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   Response to previous treatment:good   Functional change: attempting to play his guitar    Pain: 1/10  Location: left hands      Objective     Objective Measures updated at progress report unless specified.    Treatment     Dong received the treatments listed below:      therapeutic exercises to develop strength, endurance, ROM, and flexibility for 15 minutes including:  Wrist AROM flex/ext, RD/UD, sup/pro x 10 ea   Wrist wheel flex/ext, sup/pro x 2 min ea     manual therapy techniques: Manual Lymphatic Drainage and Soft tissue Mobilization were applied to the: L dorsal wrist and hand for 8 minutes, including:  Retrograde massage to decr edema  Scar massage to decr adhesions     therapeutic activities to improve functional performance for 15  minutes, including:  Towel walks x 3 min  Wrist maze x 3 min  In hand manip with poms x3 sets         supervised modalities after being cleared for contradictions: Fluidotherapy: To L wrist for 10 min, continuous air, 110 deg, air speed 100 to decrease pain, edema & scar tissue and increased tissue " extensibility.      Patient Education and Home Exercises     Education provided:   - HEP  - scar massage and edema massage   - Progress towards goals     Written Home Exercises Provided: Patient instructed to cont prior HEP.  Exercises were reviewed and Dong was able to demonstrate them prior to the end of the session.  Dong demonstrated good  understanding of the HEP provided. See EMR under Patient Instructions for exercises provided during therapy sessions.       Assessment     Pt would continue to benefit from skilled OT. He tolerated session well with some c/o stiffness during ROM. He demo'd minimal swelling and edema.     Dong is progressing well towards his goals and there are no updates to goals at this time. Pt prognosis is Excellent.     Pt will continue to benefit from skilled outpatient occupational therapy to address the deficits listed in the problem list on initial evaluation provide pt/family education and to maximize pt's level of independence in the home and community environment.     Pt's spiritual, cultural and educational needs considered and pt agreeable to plan of care and goals.    Anticipated barriers to occupational therapy: none     Goals:  LTG's (12 weeks):  1)   Increase ROM to WFL composite fist degrees to increase functional hand use for manipulating guitar strings. ------progressing not met 7/11/2023  2)   Increase ROM by 50 degrees in wrist flexion/extension to increase functional hand use for dressing skills. ------progressing not met 7/11/2023  3)   Increase  strength by 50 lbs. to grasp pot handle. ------progressing not met 7/11/2023  4)   Decrease complaints of pain to  0 out of 10 at worst to increase functional hand use for ADL/work/leisure activities. ------progressing not met 7/11/2023  5)   Pt will return to near to prior level of function for ADLs and household management reporting I or Mod I with ADLs (dressing, feeding, grooming, toileting). ------progressing  not met 7/11/2023     STG's (6 weeks)  1)   Patient to be IND with HEP and modalities for pain/edema managment. ------progressing not met 7/11/2023  2)   Increase ROM by 30 degrees in WF/WE to increase functional hand use for ADLs/work/leisure activities. ------progressing not met 7/11/2023  3)   Increase  strength by 25 lbs. to improve functional grasp for ADLs/work/leisure activities. ------progressing not met 7/11/2023       Plan     Updates/Grading for next session: continue to address goals     QUIN Moran/L        Client Care conference completed with evaluating therapist in regards to this patients POC as evidenced by co signature of supervising therapist.

## 2023-07-12 NOTE — PROGRESS NOTES
"  [facility]  Occupational Therapy Treatment Note     Date: 7/13/2023  Name: Srinivas Snyder Jr.  Clinic Number: [id number]    Therapy Diagnosis:   Encounter Diagnosis   Name Primary?    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   Response to previous treatment:good   Functional change: attempting to play his guitar    Pain: 1/10  Location: left hands      Objective     Objective Measures updated at progress report unless specified.    Treatment     Dong received the treatments listed below:      therapeutic exercises to develop strength, endurance, ROM, and flexibility for 13 minutes including:  Wrist PRE with dowel 3 ways x 10 ea   Wrist wheel flex/ext, sup/pro x 2 min ea     manual therapy techniques: Manual Lymphatic Drainage and Soft tissue Mobilization were applied to the: L dorsal wrist and hand for 8 minutes, including:  STM and scraping to distal wrist extensors   Scar massage to decr adhesions     therapeutic activities to improve functional performance for 11  minutes, including:  Towel walks x 3 min  Wrist maze x 3 min  Octi x 3 min   Krystian balance x 2 min          supervised modalities after being cleared for contradictions: Fluidotherapy: To L wrist for 10 min, continuous air, 110 deg, air speed 100 to decrease pain, edema & scar tissue and increased tissue extensibility.      Patient " Education and Home Exercises     Education provided:   - HEP  - scar massage and edema massage   - Progress towards goals     Written Home Exercises Provided: Patient instructed to cont prior HEP.  Exercises were reviewed and Dong was able to demonstrate them prior to the end of the session.  Dong demonstrated good  understanding of the HEP provided. See EMR under Patient Instructions for exercises provided during therapy sessions.       Assessment     Pt would continue to benefit from skilled OT. He tolerated session well. He continues to c/o stiffness in his wrist extensors with flexion however ROM is progressing.     Dong is progressing well towards his goals and there are no updates to goals at this time. Pt prognosis is Excellent.     Pt will continue to benefit from skilled outpatient occupational therapy to address the deficits listed in the problem list on initial evaluation provide pt/family education and to maximize pt's level of independence in the home and community environment.     Pt's spiritual, cultural and educational needs considered and pt agreeable to plan of care and goals.    Anticipated barriers to occupational therapy: none     Goals:  LTG's (12 weeks):  1)   Increase ROM to WFL composite fist degrees to increase functional hand use for manipulating guitar strings. ------progressing not met 7/13/2023  2)   Increase ROM by 50 degrees in wrist flexion/extension to increase functional hand use for dressing skills. ------progressing not met 7/13/2023  3)   Increase  strength by 50 lbs. to grasp pot handle. ------progressing not met 7/13/2023  4)   Decrease complaints of pain to  0 out of 10 at worst to increase functional hand use for ADL/work/leisure activities. ------progressing not met 7/13/2023  5)   Pt will return to near to prior level of function for ADLs and household management reporting I or Mod I with ADLs (dressing, feeding, grooming, toileting). ------progressing not met  7/13/2023     STG's (6 weeks)  1)   Patient to be IND with HEP and modalities for pain/edema managment. ------progressing not met 7/13/2023  2)   Increase ROM by 30 degrees in WF/WE to increase functional hand use for ADLs/work/leisure activities. ------progressing not met 7/13/2023  3)   Increase  strength by 25 lbs. to improve functional grasp for ADLs/work/leisure activities. ------progressing not met 7/13/2023       Plan     Updates/Grading for next session: continue to address goals     QUIN Moran/SADIA

## 2023-07-13 ENCOUNTER — CLINICAL SUPPORT (OUTPATIENT)
Dept: REHABILITATION | Facility: HOSPITAL | Age: 48
End: 2023-07-13
Payer: COMMERCIAL

## 2023-07-13 DIAGNOSIS — M25.532 LEFT WRIST PAIN: Primary | ICD-10-CM

## 2023-07-13 DIAGNOSIS — E03.4 HYPOTHYROIDISM DUE TO ACQUIRED ATROPHY OF THYROID: ICD-10-CM

## 2023-07-13 PROCEDURE — 97022 WHIRLPOOL THERAPY: CPT | Mod: PO,CO

## 2023-07-13 PROCEDURE — 97140 MANUAL THERAPY 1/> REGIONS: CPT | Mod: PO,CO

## 2023-07-13 PROCEDURE — 97530 THERAPEUTIC ACTIVITIES: CPT | Mod: PO,CO

## 2023-07-13 PROCEDURE — 97110 THERAPEUTIC EXERCISES: CPT | Mod: PO,CO

## 2023-07-13 NOTE — TELEPHONE ENCOUNTER
No care due was identified.  [facility] Embedded Care Due Messages. Reference number: [id number].   7/13/2023 5:22:48 PM CDT

## 2023-07-13 NOTE — TELEPHONE ENCOUNTER
Refill Routing Note   Medication(s) are not appropriate for processing by [facility] for the following reason(s):      Required labs outdated    [facility] action(s):  Defer Care Due:  None identified          Appointments  past 12m or future 3m with PCP    Date Provider   Last Visit   5/30/2023 Amor Reeder, DO   Next Visit   7/13/2023 Amor Reeder,    ED visits in past 90 days: 1        Note composed:5:35 PM 07/13/2023

## 2023-07-14 ENCOUNTER — PATIENT MESSAGE (OUTPATIENT)
Dept: ENDOSCOPY | Facility: HOSPITAL | Age: 48
End: 2023-07-14

## 2023-07-14 RX ORDER — LEVOTHYROXINE SODIUM 137 UG/1
TABLET ORAL
Qty: 90 TABLET | Refills: 3 | Status: SHIPPED | OUTPATIENT
Start: 2023-07-14 | End: 2023-07-17 | Stop reason: SDUPTHER

## 2023-07-17 ENCOUNTER — HOSPITAL ENCOUNTER (OUTPATIENT)
Dept: RADIOLOGY | Facility: OTHER | Age: 48
Discharge: HOME | End: 2023-07-17
Attending: STUDENT IN AN ORGANIZED HEALTH CARE EDUCATION/TRAINING PROGRAM
Payer: COMMERCIAL

## 2023-07-17 ENCOUNTER — PATIENT MESSAGE (OUTPATIENT)
Dept: INTERNAL MEDICINE | Facility: CLINIC | Age: 48
End: 2023-07-17
Payer: COMMERCIAL

## 2023-07-17 ENCOUNTER — OFFICE VISIT (OUTPATIENT)
Dept: INTERNAL MEDICINE | Facility: CLINIC | Age: 48
End: 2023-07-17
Payer: COMMERCIAL

## 2023-07-17 VITALS
BODY MASS INDEX: 23.67 KG/M2 | WEIGHT: 155.63 LBS | OXYGEN SATURATION: 99 % | DIASTOLIC BLOOD PRESSURE: 80 MMHG | SYSTOLIC BLOOD PRESSURE: 126 MMHG | HEART RATE: 78 BPM

## 2023-07-17 DIAGNOSIS — G89.29 CHRONIC RIGHT-SIDED LOW BACK PAIN, UNSPECIFIED WHETHER SCIATICA PRESENT: ICD-10-CM

## 2023-07-17 DIAGNOSIS — E03.4 HYPOTHYROIDISM DUE TO ACQUIRED ATROPHY OF THYROID: ICD-10-CM

## 2023-07-17 DIAGNOSIS — M54.50 CHRONIC RIGHT-SIDED LOW BACK PAIN, UNSPECIFIED WHETHER SCIATICA PRESENT: ICD-10-CM

## 2023-07-17 DIAGNOSIS — M25.511 ACUTE PAIN OF RIGHT SHOULDER: ICD-10-CM

## 2023-07-17 DIAGNOSIS — M25.511 ACUTE PAIN OF RIGHT SHOULDER: Primary | ICD-10-CM

## 2023-07-17 DIAGNOSIS — E78.00 HYPERCHOLESTEREMIA: ICD-10-CM

## 2023-07-17 PROCEDURE — 73030 X-RAY EXAM OF SHOULDER: CPT | Mod: TC,FY,RT

## 2023-07-17 PROCEDURE — 73030 X-RAY EXAM OF SHOULDER: CPT | Mod: 26,RT,, | Performed by: RADIOLOGY

## 2023-07-17 PROCEDURE — 3008F PR BODY MASS INDEX (BMI) DOCUMENTED: ICD-10-PCS | Mod: CPTII,S$GLB,, | Performed by: STUDENT IN AN ORGANIZED HEALTH CARE EDUCATION/TRAINING PROGRAM

## 2023-07-17 PROCEDURE — 99999 PR PBB SHADOW E&M-EST. PATIENT-LVL III: CPT | Mod: PBBFAC,,, | Performed by: STUDENT IN AN ORGANIZED HEALTH CARE EDUCATION/TRAINING PROGRAM

## 2023-07-17 PROCEDURE — 99999 PR PBB SHADOW E&M-EST. PATIENT-LVL III: ICD-10-PCS | Mod: PBBFAC,,, | Performed by: STUDENT IN AN ORGANIZED HEALTH CARE EDUCATION/TRAINING PROGRAM

## 2023-07-17 PROCEDURE — 3008F BODY MASS INDEX DOCD: CPT | Mod: CPTII,S$GLB,, | Performed by: STUDENT IN AN ORGANIZED HEALTH CARE EDUCATION/TRAINING PROGRAM

## 2023-07-17 PROCEDURE — 3079F DIAST BP 80-89 MM HG: CPT | Mod: CPTII,S$GLB,, | Performed by: STUDENT IN AN ORGANIZED HEALTH CARE EDUCATION/TRAINING PROGRAM

## 2023-07-17 PROCEDURE — 1159F MED LIST DOCD IN RCRD: CPT | Mod: CPTII,S$GLB,, | Performed by: STUDENT IN AN ORGANIZED HEALTH CARE EDUCATION/TRAINING PROGRAM

## 2023-07-17 PROCEDURE — 99213 PR OFFICE/OUTPT VISIT, EST, LEVL III, 20-29 MIN: ICD-10-PCS | Mod: S$GLB,,, | Performed by: STUDENT IN AN ORGANIZED HEALTH CARE EDUCATION/TRAINING PROGRAM

## 2023-07-17 PROCEDURE — 3079F PR MOST RECENT DIASTOLIC BLOOD PRESSURE 80-89 MM HG: ICD-10-PCS | Mod: CPTII,S$GLB,, | Performed by: STUDENT IN AN ORGANIZED HEALTH CARE EDUCATION/TRAINING PROGRAM

## 2023-07-17 PROCEDURE — 3074F PR MOST RECENT SYSTOLIC BLOOD PRESSURE < 130 MM HG: ICD-10-PCS | Mod: CPTII,S$GLB,, | Performed by: STUDENT IN AN ORGANIZED HEALTH CARE EDUCATION/TRAINING PROGRAM

## 2023-07-17 PROCEDURE — 73030 XR SHOULDER TRAUMA 3 VIEW RIGHT: ICD-10-PCS | Mod: 26,RT,, | Performed by: RADIOLOGY

## 2023-07-17 PROCEDURE — 3074F SYST BP LT 130 MM HG: CPT | Mod: CPTII,S$GLB,, | Performed by: STUDENT IN AN ORGANIZED HEALTH CARE EDUCATION/TRAINING PROGRAM

## 2023-07-17 PROCEDURE — 99213 OFFICE O/P EST LOW 20 MIN: CPT | Mod: S$GLB,,, | Performed by: STUDENT IN AN ORGANIZED HEALTH CARE EDUCATION/TRAINING PROGRAM

## 2023-07-17 PROCEDURE — 1159F PR MEDICATION LIST DOCUMENTED IN MEDICAL RECORD: ICD-10-PCS | Mod: CPTII,S$GLB,, | Performed by: STUDENT IN AN ORGANIZED HEALTH CARE EDUCATION/TRAINING PROGRAM

## 2023-07-17 RX ORDER — ATORVASTATIN CALCIUM 20 MG/1
20 TABLET, FILM COATED ORAL DAILY
Qty: 90 TABLET | Refills: 3 | Status: SHIPPED | OUTPATIENT
Start: 2023-07-17 | End: 2023-10-16 | Stop reason: SDUPTHER

## 2023-07-17 RX ORDER — METHOCARBAMOL 750 MG/1
750 TABLET, FILM COATED ORAL 3 TIMES DAILY PRN
Qty: 40 TABLET | Refills: 0 | Status: SHIPPED | OUTPATIENT
Start: 2023-07-17

## 2023-07-17 RX ORDER — LEVOTHYROXINE SODIUM 137 UG/1
137 TABLET ORAL
Qty: 90 TABLET | Refills: 3 | Status: SHIPPED | OUTPATIENT
Start: 2023-07-17

## 2023-07-17 NOTE — TELEPHONE ENCOUNTER
No care due was identified.  [facility] Embedded Care Due Messages. Reference number: [id number].   7/17/2023 9:31:48 AM CDT

## 2023-07-17 NOTE — PROGRESS NOTES
Subjective:       Patient ID: Srinivas Snyder Jr. is a 47 y.o. male.    Chief Complaint: Acute pain of right shoulder [M25.511]    Patient is new to me, PCP is Dr. Reeder. Here today for the following:    Here today for right shoulder pain  Pain started Monday or Tuesday, throbbing type pain  Had been to the beach the weekend before  Endorses had wife on his shoulders while they were in the pool   Had PICC line removed from right arm about a month ago  Has been taking Advil with some improvement in the pain   Does note some tingling in right leg, none in arm  Endorses episodic low back pain on the right side, none currently    Review of Systems   Musculoskeletal:  Positive for arthralgias, back pain and myalgias.       Current Outpatient Medications   Medication Instructions    atorvastatin (LIPITOR) 20 mg, Oral, Daily    ibuprofen (ADVIL,MOTRIN) 600 mg, Oral, Every 6 hours PRN    indomethacin (INDOCIN) 50 mg, Oral, 3 times daily    levothyroxine (SYNTHROID) 137 mcg, Oral, Before breakfast    [START ON 7/30/2023] lisdexamfetamine (VYVANSE) 30 mg, Oral, Every morning    lisdexamfetamine (VYVANSE) 30 mg, Oral, Every morning    lisdexamfetamine (VYVANSE) 30 mg, Oral, Every morning    methocarbamoL (ROBAXIN) 750 mg, Oral, 3 times daily PRN    traMADoL (ULTRAM) 50 mg, Oral, Every 6 hours PRN     Objective:      Vitals:    07/17/23 1513   BP: 126/80   Pulse: 78   SpO2: 99%   Weight: 70.6 kg (155 lb 10.3 oz)   PainSc:   2     Body mass index is 23.67 kg/m².    Physical Exam  Vitals reviewed.   Constitutional:       General: He is not in acute distress.     Appearance: He is not ill-appearing or diaphoretic.   Cardiovascular:      Rate and Rhythm: Normal rate and regular rhythm.      Heart sounds: Normal heart sounds. No murmur heard.    No friction rub. No gallop.   Pulmonary:      Effort: Pulmonary effort is normal. No respiratory distress.      Breath sounds: Normal breath sounds. No stridor. No wheezing,  rhonchi or rales.   Musculoskeletal:      Right shoulder: Tenderness (significant muscle tension upper trapezius) present. No swelling, deformity, effusion, laceration, bony tenderness or crepitus. Normal range of motion. Normal strength.      Comments: Positive empty can test right.    Pain with external rotation of right arm with arm at side and elbow flexed to 90 degrees.   Pain with modified lift-off test right side.  No muscle wasting appreciated to right shoulder.   Pain with abduction of right arm above 90 degrees.   Positive cross-arm test right.    Shoulder abduction left 5/5 right 5/5  Elbow flexion left 5/5 right 5/5  Elbow extension left 5/5 right 5/5   strength left 5/5 right 5/5  Hip flexion left 5/5 right 5/5  Knee extension left 5/5 right 5/5  Knee flexion left 5/5 right 5/5    KATT negative bilaterally  Straight leg raise negative bilaterally   Non-TTP upper gluteal region bilaterally  Non-TTP SI joint bilaterally  Non-TTP trochanteric bursa bilaterally       Skin:     General: Skin is warm and dry.      Comments: Color WNL   Neurological:      Mental Status: He is alert. Mental status is at baseline.   Psychiatric:         Mood and Affect: Mood normal.         Behavior: Behavior normal.       Assessment:       1. Acute pain of right shoulder    2. Chronic right-sided low back pain, unspecified whether sciatica present        Plan:       Acute pain of right shoulder  Discussed conservative treatment at home: heat, gentle stretching, and muscle relaxer PRN.  Provided handout with home stretches and exercises. Do not perform stretches/exercises if they cause overt pain.  RTC in pain unimproved or worsening in the next 1-2 weeks.   -     X-Ray Shoulder Trauma 3 view Right; Future  -     methocarbamoL (ROBAXIN) 750 MG Tab; Take 1 tablet (750 mg total) by mouth 3 (three) times daily as needed (shoulder/back pain).    Chronic right-sided low back pain, unspecified whether sciatica present  Discussed  conservative treatment at home: heat, gentle stretching, and muscle relaxer PRN.  Provided handout with home stretches and exercises. Do not perform stretches/exercises if they cause overt pain.  RTC in pain unimproved or worsening in the next 1-2 weeks.         Marialuisa Corona MD  7/17/2023

## 2023-07-19 ENCOUNTER — TELEPHONE (OUTPATIENT)
Dept: ENDOSCOPY | Facility: HOSPITAL | Age: 48
End: 2023-07-19
Payer: COMMERCIAL

## 2023-07-19 ENCOUNTER — CLINICAL SUPPORT (OUTPATIENT)
Dept: REHABILITATION | Facility: HOSPITAL | Age: 48
End: 2023-07-19
Payer: COMMERCIAL

## 2023-07-19 DIAGNOSIS — M25.532 LEFT WRIST PAIN: Primary | ICD-10-CM

## 2023-07-19 PROCEDURE — 97530 THERAPEUTIC ACTIVITIES: CPT | Mod: PO

## 2023-07-19 PROCEDURE — 97022 WHIRLPOOL THERAPY: CPT | Mod: PO

## 2023-07-19 PROCEDURE — 97140 MANUAL THERAPY 1/> REGIONS: CPT | Mod: PO

## 2023-07-19 NOTE — PROGRESS NOTES
"  [facility]  Occupational Therapy Treatment Note     Date: 7/19/2023  Name: Srinivas Snyder Jr.  Clinic Number: [id number]    Therapy Diagnosis:   Encounter Diagnosis   Name Primary?    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   Response to previous treatment:good   Functional change: None reported    Pain: 1/10  Location: left hands      Objective     Objective Measures updated at progress report unless specified.    Treatment     Dong received the treatments listed below:      manual therapy techniques: Manual Lymphatic Drainage and Soft tissue Mobilization were applied to the: L dorsal wrist and hand for 8 minutes, including:  STM and scraping to distal wrist extensors   Scar massage to decr adhesions     therapeutic activities to improve functional performance for 27  minutes, including:  Towel walks x 3 min  Wrist maze x 3 min  Octi x 3 min   Krystian balance x 2 min    Wrist PRE with dowel 3 ways x 10 ea     supervised modalities after being cleared for contradictions: Fluidotherapy: To L wrist for 10 min, continuous air, 110 deg, air speed 100 to decrease pain, edema & scar tissue and increased tissue extensibility.      Patient Education and Home Exercises     Education provided:   - HEP  - scar massage and edema massage   - Progress towards goals     Written Home Exercises Provided: " Patient instructed to cont prior HEP.  Exercises were reviewed and Dong was able to demonstrate them prior to the end of the session.  Dong demonstrated good  understanding of the HEP provided. See EMR under Patient Instructions for exercises provided during therapy sessions.       Assessment     Pt would continue to benefit from skilled OT. Pt with continued stiffness in wrist extensors but demo'd less swelling post session today as compared to previous sessions.    Dong is progressing well towards his goals and there are no updates to goals at this time. Pt prognosis is Excellent.     Pt will continue to benefit from skilled outpatient occupational therapy to address the deficits listed in the problem list on initial evaluation provide pt/family education and to maximize pt's level of independence in the home and community environment.     Pt's spiritual, cultural and educational needs considered and pt agreeable to plan of care and goals.    Anticipated barriers to occupational therapy: none     Goals:  LTG's (12 weeks):  1)   Increase ROM to WFL composite fist degrees to increase functional hand use for manipulating guitar strings. ------progressing not met 7/19/2023  2)   Increase ROM by 50 degrees in wrist flexion/extension to increase functional hand use for dressing skills. ------progressing not met 7/19/2023  3)   Increase  strength by 50 lbs. to grasp pot handle. ------progressing not met 7/19/2023  4)   Decrease complaints of pain to  0 out of 10 at worst to increase functional hand use for ADL/work/leisure activities. ------progressing not met 7/19/2023  5)   Pt will return to near to prior level of function for ADLs and household management reporting I or Mod I with ADLs (dressing, feeding, grooming, toileting). ------progressing not met 7/19/2023     STG's (6 weeks)  1)   Patient to be IND with HEP and modalities for pain/edema managment. ------progressing not met 7/19/2023  2)   Increase  ROM by 30 degrees in WF/WE to increase functional hand use for ADLs/work/leisure activities. ------progressing not met 7/19/2023  3)   Increase  strength by 25 lbs. to improve functional grasp for ADLs/work/leisure activities. ------progressing not met 7/19/2023       Plan     Updates/Grading for next session: continue to address goals     Mila Ayon, OT

## 2023-07-21 ENCOUNTER — DOCUMENTATION ONLY (OUTPATIENT)
Dept: REHABILITATION | Facility: HOSPITAL | Age: 48
End: 2023-07-21
Payer: COMMERCIAL

## 2023-07-26 ENCOUNTER — CLINICAL SUPPORT (OUTPATIENT)
Dept: REHABILITATION | Facility: HOSPITAL | Age: 48
End: 2023-07-26
Payer: COMMERCIAL

## 2023-07-26 DIAGNOSIS — M25.532 LEFT WRIST PAIN: Primary | ICD-10-CM

## 2023-07-26 PROCEDURE — 97530 THERAPEUTIC ACTIVITIES: CPT | Mod: PO

## 2023-07-26 PROCEDURE — 97022 WHIRLPOOL THERAPY: CPT | Mod: PO

## 2023-07-26 PROCEDURE — 97140 MANUAL THERAPY 1/> REGIONS: CPT | Mod: PO

## 2023-07-26 NOTE — PROGRESS NOTES
"  [facility]  Occupational Therapy Treatment Note     Date: 7/26/2023  Name: Srinivas Snyder Jr.  Clinic Number: [id number]    Therapy Diagnosis:   Encounter Diagnosis   Name Primary?    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   Response to previous treatment:good   Functional change: None reported    Pain: 1/10  Location: left hands      Objective     Objective Measures updated at progress report unless specified.    Treatment     Dong received the treatments listed below:      manual therapy techniques: Manual Lymphatic Drainage and Soft tissue Mobilization were applied to the: L dorsal wrist and hand for 8 minutes, including:  STM and scraping to distal wrist extensors   Scar massage to decrease adhesions     therapeutic activities to improve functional performance for 28  minutes, including:  Towel walks with #3 weight x2 min  Wrist maze x 3 min  Octi x 3 min   Krystian balance x 2 min    Wrist PRE with dowel 3 ways x 10 ea   Wall slides to increase wrist extension 2'  Opposition each finger and cut up sponges 2 containers  Finger extension 20x each finger  KT tape around wrist to decrease scar tissue    supervised modalities after being cleared for contradictions: Fluidotherapy: To L wrist for 10 min, continuous air, 110 deg, air speed 100 to decrease pain, edema & scar tissue and " increased tissue extensibility.    Patient Education and Home Exercises     Education provided:   - HEP  - scar massage and edema massage   - Progress towards goals     Written Home Exercises Provided: Patient instructed to cont prior HEP.  Exercises were reviewed and Dong was able to demonstrate them prior to the end of the session.  Dong demonstrated good  understanding of the HEP provided. See EMR under Patient Instructions for exercises provided during therapy sessions.       Assessment     Pt would continue to benefit from skilled OT. Pt with good tolerance for all exercises. Pt with min c/o of discomfort at dorsal hand/wrist.    Dong is progressing well towards his goals and there are no updates to goals at this time. Pt prognosis is Excellent.     Pt will continue to benefit from skilled outpatient occupational therapy to address the deficits listed in the problem list on initial evaluation provide pt/family education and to maximize pt's level of independence in the home and community environment.     Pt's spiritual, cultural and educational needs considered and pt agreeable to plan of care and goals.    Anticipated barriers to occupational therapy: none     Goals:  LTG's (12 weeks):  1)   Increase ROM to WFL composite fist degrees to increase functional hand use for manipulating guitar strings. ------progressing not met 7/26/2023  2)   Increase ROM by 50 degrees in wrist flexion/extension to increase functional hand use for dressing skills. ------progressing not met 7/26/2023  3)   Increase  strength by 50 lbs. to grasp pot handle. ------progressing not met 7/26/2023  4)   Decrease complaints of pain to  0 out of 10 at worst to increase functional hand use for ADL/work/leisure activities. ------progressing not met 7/26/2023  5)   Pt will return to near to prior level of function for ADLs and household management reporting I or Mod I with ADLs (dressing, feeding, grooming, toileting).  ------progressing not met 7/26/2023     STG's (6 weeks)  1)   Patient to be IND with HEP and modalities for pain/edema managment. ------progressing not met 7/26/2023  2)   Increase ROM by 30 degrees in WF/WE to increase functional hand use for ADLs/work/leisure activities. ------progressing not met 7/26/2023  3)   Increase  strength by 25 lbs. to improve functional grasp for ADLs/work/leisure activities. ------progressing not met 7/26/2023       Plan     Updates/Grading for next session: continue to address goals     Mila Ayon, OT

## 2023-07-28 ENCOUNTER — CLINICAL SUPPORT (OUTPATIENT)
Dept: REHABILITATION | Facility: HOSPITAL | Age: 48
End: 2023-07-28
Payer: COMMERCIAL

## 2023-07-28 DIAGNOSIS — M25.532 LEFT WRIST PAIN: Primary | ICD-10-CM

## 2023-07-28 PROCEDURE — 97530 THERAPEUTIC ACTIVITIES: CPT | Mod: PO

## 2023-07-28 PROCEDURE — 97022 WHIRLPOOL THERAPY: CPT | Mod: PO

## 2023-07-28 NOTE — PROGRESS NOTES
[facility]  Occupational Therapy Treatment Note     Date: 7/28/2023  Name: Srinivas Snyder Jr.  Clinic Number: [id number]    Therapy Diagnosis:   Encounter Diagnosis   Name Primary?    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   Response to previous treatment:good   Functional change: None reported    Pain: 1/10  Location: left hands      Objective     Edema: Circumferential measurements: In centimters       Left Right   PWC (Proximal Wrist Crease) 18.2  17.0       Range of Motion:   Left     Elbow and Wrist ROM. Measured in degrees.    6/20/2023 6/20/2023 7/28/23       Left Right Left      Supination full full  -     Pronation full full  -     Wrist Ext 60 35  60*     Wrist Flex 65 25  55*     Wrist RD 9 9  14*     Wrist UD 40 15  29*      *After therapy    Hand ROM. Measured in degrees.    6/20/2023        Left                Index: MP  0/69                  PIP     0/83                  DIP 0/34                 Long:  MP 0/81                  PIP 0/81                  DIP 0/43                 Ring:   MP 0/84                  PIP 0/81                  DIP 0/36                 Small:  MP 0/90                   PIP 0/80                   DIP 0/38              Strength (Dynamometer) and Pinch Strength (Pinch Gauge)  Measured in pounds.    6/20/2023 6/20/2023        Left Right       Rung II 27.1* 88.9        *Point of Pain    Treatment     Dong received the treatments listed below:      therapeutic activities to improve functional performance for 33  minutes, including:  Towel walks with #3 weight x3 min  Wrist maze x 3 min  Octi x 3 min   Krystian balance x 1 min    Prayer stretch into WE 10 sec hold 10x   Hammer 20x for increased stretch into UD/RD 10x 5 second hold  Wrist PRE with dowel 3 ways x 10 ea     supervised modalities after being cleared for contradictions: Fluidotherapy: To L wrist for 10 min, continuous air, 110 deg, air speed 100 to decrease pain, edema & scar tissue and increased tissue extensibility.    Patient Education and Home Exercises     Education provided:   - HEP  - scar massage and edema massage   - Progress towards goals     Written Home Exercises Provided: Patient instructed to cont prior HEP.  Exercises were reviewed and Dong was able to demonstrate them prior to the end of the session.  Dong demonstrated good  understanding of the HEP provided. See EMR under Patient Instructions for exercises provided during therapy sessions.       Assessment     Pt would continue to benefit from skilled OT. Pt with good tolerance for all exercises. Pt with improvements in wrist mobility since beginning therapy. Pt with good HEP compliance.    Dong is progressing well towards his goals and there are no updates to goals at this time. Pt prognosis is Excellent.     Pt will continue to benefit from skilled outpatient occupational therapy to address the deficits listed in the problem list on initial evaluation provide pt/family education and to maximize pt's level of independence in the home and community environment.     Pt's spiritual, cultural and educational needs considered and pt agreeable to plan of care and goals.    Anticipated barriers to occupational therapy: none     Goals:  LTG's (12 weeks):  1)   Increase ROM to WFL composite fist degrees to increase functional hand use for manipulating  guitar strings. ------progressing not met 7/28/2023  2)   Increase ROM by 50 degrees in wrist flexion/extension to increase functional hand use for dressing skills. ------progressing not met 7/28/2023  3)   Increase  strength by 50 lbs. to grasp pot handle. ------progressing not met 7/28/2023  4)   Decrease complaints of pain to  0 out of 10 at worst to increase functional hand use for ADL/work/leisure activities. ------progressing not met 7/28/2023  5)   Pt will return to near to prior level of function for ADLs and household management reporting I or Mod I with ADLs (dressing, feeding, grooming, toileting). ------progressing not met 7/28/2023     STG's (6 weeks)  1)   Patient to be IND with HEP and modalities for pain/edema managment. ------progressing not met 7/28/2023  2)   Increase ROM by 30 degrees in WF/WE to increase functional hand use for ADLs/work/leisure activities. ------progressing not met 7/28/2023  3)   Increase  strength by 25 lbs. to improve functional grasp for ADLs/work/leisure activities. ------progressing not met 7/28/2023       Plan     Updates/Grading for next session: continue to address goals     Mila Ayon, OT

## 2023-08-02 ENCOUNTER — CLINICAL SUPPORT (OUTPATIENT)
Dept: REHABILITATION | Facility: HOSPITAL | Age: 48
End: 2023-08-02
Payer: COMMERCIAL

## 2023-08-02 DIAGNOSIS — M25.532 LEFT WRIST PAIN: Primary | ICD-10-CM

## 2023-08-02 PROCEDURE — 97530 THERAPEUTIC ACTIVITIES: CPT | Mod: PO

## 2023-08-02 NOTE — PATIENT INSTRUCTIONS
Strengthening (Resistive Putty)        Squeeze putty using thumb and all fingers.  Squeeze putty for 1 minute. Perform 3x/day.      Palmar Pinch Strengthening (Resistive Putty)        Pinch putty between thumb and index/middle fingertip.  Repeat 3 logs. Do 2-3 sessions per day.       Lateral Pinch Strengthening (Resistive Putty)        Squeeze between thumb and side of index finger.  Repeat 3 logs. Do 2-3 sessions per day.

## 2023-08-02 NOTE — PROGRESS NOTES
[facility] OUTPATIENT THERAPY AND WELLNESS  Occupational Therapy Treatment Note     Date: 8/2/2023  Name: Srinivas Snyder Jr.  Clinic Number: [id number]    Therapy Diagnosis:   Encounter Diagnosis   Name Primary?    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   Response to previous treatment:good   Functional change: None reported    Pain: Pain did not score/10  Location: left hands      Objective     Edema: Circumferential measurements: In centimters       Left Right   PWC (Proximal Wrist Crease) 18.2  17.0       Range of Motion:   Left     Elbow and Wrist ROM. Measured in degrees.    6/20/2023 6/20/2023 7/28/23       Left Right Left      Supination full full  -     Pronation full full  -     Wrist Ext 60 35  60*     Wrist Flex 65 25  55*     Wrist RD 9 9  14*     Wrist UD 40 15  29*      *After therapy    Hand ROM. Measured in degrees.    6/20/2023        Left                Index: MP  0/69                  PIP     0/83                  DIP 0/34                 Long:  MP 0/81                  PIP 0/81                  DIP 0/43                 Ring:   MP 0/84                  PIP 0/81                  DIP 0/36                 Small:  MP 0/90                   PIP 0/80                   DIP 0/38              Strength (Dynamometer) and Pinch Strength (Pinch Gauge)  Measured in pounds.    6/20/2023 6/20/2023        Left  Right       Rung II 27.1* 88.9       *Point of Pain    Treatment     Dong received the treatments listed below:      therapeutic activities to improve functional performance for 35  minutes, including:  Wrist maze x 3 min  Krystian balance x 2 min    Hammer 25x for increased stretch into pro/sup, UD/RD 10x 5 second hold  Wrist PRE with #1 weight 3 ways x 15 ea   Yellow putty gripping 1.5 minutes  MH with LLPS into WF 3' with #1 weight to decrease tightness in WF    supervised modalities after being cleared for contradictions: Fluidotherapy: To L wrist for 5 min, continuous air, 110 deg, air speed 100 to decrease pain, edema & scar tissue and increased tissue extensibility.  -MH to hand/wrist 5'    Patient Education and Home Exercises     Education provided:   - HEP- upgraded with yellow putty  - scar massage and edema massage   - Progress towards goals     Written Home Exercises Provided: Patient instructed to cont prior HEP.  Exercises were reviewed and Dong was able to demonstrate them prior to the end of the session.  Dong demonstrated good  understanding of the HEP provided. See EMR under Patient Instructions for exercises provided during therapy sessions.       Assessment     Pt would continue to benefit from skilled OT. Pt with good tolerance for all exercises. Pt demonstrates decreased endurance for gripping/wrist mobility and verbalized understanding of putty exercises for strength. Pt with good tolerance for upgraded strength exercises in session.    Dong is progressing well towards his goals and there are no updates to goals at this time. Pt prognosis is Excellent.     Pt will continue to benefit from skilled outpatient occupational therapy to address the deficits listed in the problem list on initial evaluation provide pt/family education and to maximize pt's level of independence in the home and community environment.     Pt's spiritual, cultural and educational needs considered and pt agreeable  to plan of care and goals.    Anticipated barriers to occupational therapy: none     Goals:  LTG's (12 weeks):  1)   Increase ROM to WFL composite fist degrees to increase functional hand use for manipulating guitar strings. ------progressing not met 8/2/2023  2)   Increase ROM by 50 degrees in wrist flexion/extension to increase functional hand use for dressing skills. ------progressing not met 8/2/2023  3)   Increase  strength by 50 lbs. to grasp pot handle. ------progressing not met 8/2/2023  4)   Decrease complaints of pain to  0 out of 10 at worst to increase functional hand use for ADL/work/leisure activities. ------progressing not met 8/2/2023  5)   Pt will return to near to prior level of function for ADLs and household management reporting I or Mod I with ADLs (dressing, feeding, grooming, toileting). ------progressing not met 8/2/2023     STG's (6 weeks)  1)   Patient to be IND with HEP and modalities for pain/edema managment. ------progressing not met 8/2/2023  2)   Increase ROM by 30 degrees in WF/WE to increase functional hand use for ADLs/work/leisure activities. ------progressing not met 8/2/2023  3)   Increase  strength by 25 lbs. to improve functional grasp for ADLs/work/leisure activities. ------progressing not met 8/2/2023       Plan     Updates/Grading for next session: continue to address goals     Mila Ayon, OT

## 2023-08-09 ENCOUNTER — CLINICAL SUPPORT (OUTPATIENT)
Dept: REHABILITATION | Facility: HOSPITAL | Age: 48
End: 2023-08-09
Payer: COMMERCIAL

## 2023-08-09 DIAGNOSIS — M25.532 LEFT WRIST PAIN: Primary | ICD-10-CM

## 2023-08-09 PROCEDURE — 97530 THERAPEUTIC ACTIVITIES: CPT | Mod: PO

## 2023-08-09 NOTE — PROGRESS NOTES
[facility]  Occupational Therapy Treatment Note     Date: 8/9/2023  Name: Srinivas Snyder Jr.  Clinic Number: [id number]    Therapy Diagnosis:   Encounter Diagnosis   Name Primary?    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   Response to previous treatment:good   Functional change: None reported    Pain: Pain did not score/10  Location: left hands      Objective     Edema: Circumferential measurements: In centimters       Left Right   PWC (Proximal Wrist Crease) 18.2  17.0       Range of Motion:   Left     Elbow and Wrist ROM. Measured in degrees.    6/20/2023 6/20/2023 7/28/23 8/9/23      Right Left Left   Left   Supination full full  -  full   Pronation full full  -  full   Wrist Ext 60 35  60* 60    Wrist Flex 65 25  55*  60   Wrist RD 9 9  14*  20   Wrist UD 40 15  29*  31    *After therapy    Hand ROM. Measured in degrees.    6/20/2023     Left         Index: MP  0/69              PIP     0/83              DIP 0/34         Long:  MP 0/81              PIP 0/81              DIP 0/43         Ring:   MP 0/84              PIP 0/81              DIP 0/36         Small:  MP 0/90               PIP 0/80               DIP 0/38    8/9/23:       Strength (Dynamometer) and Pinch Strength (Pinch Gauge)  Measured in pounds.    6/20/2023 6/20/2023     Left Right   Rung II 27.1* 88.9   *Point of  Pain    Treatment     Dong received the treatments listed below:      therapeutic activities to improve functional performance for 46  minutes, including:  Krystian balance x 2 min    Yellow flexbar pro/sup, WF/WE, UD/RD 20x each   Yellow digiextend 2'  Yellow putty with #3 tooling to flatten   Wrist PRE with #2 weight 3 ways x 20 ea   Wall push ups 10x   Yellow putty gripping 2 minutes  MH with LLPS into WF 10' with #3 weight to decrease tightness in WF    Patient Education and Home Exercises     Education provided:   - HEP- upgraded with yellow putty  - scar massage and edema massage   - Progress towards goals     Written Home Exercises Provided: Patient instructed to cont prior HEP.  Exercises were reviewed and Dong was able to demonstrate them prior to the end of the session.  Dong demonstrated good  understanding of the HEP provided. See EMR under Patient Instructions for exercises provided during therapy sessions.       Assessment     Pt would continue to benefit from skilled OT. Pt with good tolerance for all tx ax. Pt with improved wrist mobility and strength for all tx ax. Pt with minor discomfort for wall push ups with pain resolved a few minutes after exercise.    Dong is progressing well towards his goals and there are no updates to goals at this time. Pt prognosis is Excellent.     Pt will continue to benefit from skilled outpatient occupational therapy to address the deficits listed in the problem list on initial evaluation provide pt/family education and to maximize pt's level of independence in the home and community environment.     Pt's spiritual, cultural and educational needs considered and pt agreeable to plan of care and goals.    Anticipated barriers to occupational therapy: none     Goals:  LTG's (12 weeks):  1)   Increase ROM to WFL composite fist degrees to increase functional hand use for manipulating guitar strings. ------progressing not met 8/9/2023  2)   Increase ROM by 50  degrees in wrist flexion/extension to increase functional hand use for dressing skills. ------met 8/9/2023  3)   Increase  strength by 50 lbs. to grasp pot handle. ------progressing not met 8/9/2023  4)   Decrease complaints of pain to  0 out of 10 at worst to increase functional hand use for ADL/work/leisure activities. ------met 8/9/2023  5)   Pt will return to near to prior level of function for ADLs and household management reporting I or Mod I with ADLs (dressing, feeding, grooming, toileting). ------progressing not met 8/9/2023     STG's (6 weeks)  1)   Patient to be IND with HEP and modalities for pain/edema managment. ------met 8/9/2023  2)   Increase ROM by 30 degrees in WF/WE to increase functional hand use for ADLs/work/leisure activities. ------met 8/9/2023  3)   Increase  strength by 25 lbs. to improve functional grasp for ADLs/work/leisure activities. ------progressing not met 8/9/2023       Plan     Updates/Grading for next session: continue to address goals     Mila Ayon, OT

## 2023-08-16 ENCOUNTER — CLINICAL SUPPORT (OUTPATIENT)
Dept: REHABILITATION | Facility: HOSPITAL | Age: 48
End: 2023-08-16
Payer: COMMERCIAL

## 2023-08-16 DIAGNOSIS — M25.532 LEFT WRIST PAIN: Primary | ICD-10-CM

## 2023-08-16 PROCEDURE — 97530 THERAPEUTIC ACTIVITIES: CPT | Mod: PO

## 2023-08-16 NOTE — PROGRESS NOTES
[facility]  Occupational Therapy Treatment Note     Date: 8/16/2023  Name: Srinivas Snyder Jr.  Clinic Number: [id number]    Therapy Diagnosis:   Encounter Diagnosis   Name Primary?    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   Response to previous treatment:good   Functional change: None reported    Pain: 0/10 today  Location: left hands      Objective     Edema: Circumferential measurements: In centimters       Left Right   PWC (Proximal Wrist Crease) 18.2  17.0       Range of Motion:   Left     Elbow and Wrist ROM. Measured in degrees.    6/20/2023 6/20/2023 7/28/23 8/9/23 8/16/23     Right Left Left   Left Left   Supination full full  -  full full   Pronation full full  -  full full   Wrist Ext 60 35  60* 60  69   Wrist Flex 65 25  55*  60 65   Wrist RD 9 9  14*  20 15   Wrist UD 40 15  29*  31 31    *After therapy    Hand ROM. Measured in degrees.    6/20/2023 8/16/23     Left Left       FULL AROM   Index: MP  0/69               PIP     0/83               DIP 0/34           Long:  MP 0/81               PIP 0/81               DIP 0/43           Ring:   MP 0/84               PIP 0/81               DIP 0/36           Small:  MP 0/90                PIP 0/80                DIP 0/38          Strength (Dynamometer) and Pinch Strength (Pinch Gauge)  Measured in pounds.    6/20/2023  6/20/2023 8/16/23     Left Right Left   Rung II 27.1* 88.9 84.9   *Point of Pain    Treatment     Dong received the treatments listed below:      therapeutic activities to improve functional performance for 45  minutes, including:  Krystian balance x 2 min    Red flexbar pro/sup, WF/WE, UD/RD 20x each   Yellow digiextend 2'  Yellow putty with #3 tooling to flatten   Wrist PRE with #3 weight 3 ways x 20 ea   Dowel rotation with #1 wrist weight attached for forearm rotation 2'  Black CP small pom poms 1 container  Yellow putty gripping 2 minutes  MH with LLPS into WF 10' with #3 weight to decrease tightness in WF    Patient Education and Home Exercises     Education provided:   - HEP- upgraded with yellow putty  - scar massage and edema massage   - Progress towards goals     Written Home Exercises Provided: Patient instructed to cont prior HEP.  Exercises were reviewed and Dong was able to demonstrate them prior to the end of the session.  Dong demonstrated good  understanding of the HEP provided. See EMR under Patient Instructions for exercises provided during therapy sessions.       Assessment     Pt would continue to benefit from skilled OT. Pt with improved ROM and strengthening. Instructed pt on importance of gentle increase in weights to avoid tissue irritation. Pt will be ready to d/c soon.     Dong is progressing well towards his goals and there are no updates to goals at this time. Pt prognosis is Excellent.     Pt will continue to benefit from skilled outpatient occupational therapy to address the deficits listed in the problem list on initial evaluation provide pt/family education and to maximize pt's level of independence in the home and community environment.     Pt's spiritual, cultural and educational needs considered and pt agreeable to plan of care and goals.    Anticipated barriers to occupational therapy: none     Goals:  LTG's (12 weeks):  1)   Increase ROM to WFL composite fist degrees to  increase functional hand use for manipulating guitar strings. ------progressing not met 8/16/2023  2)   Increase ROM by 50 degrees in wrist flexion/extension to increase functional hand use for dressing skills. ------met 8/16/2023  3)   Increase  strength by 50 lbs. to grasp pot handle. ------progressing not met 8/16/2023  4)   Decrease complaints of pain to  0 out of 10 at worst to increase functional hand use for ADL/work/leisure activities. ------met 8/16/2023  5)   Pt will return to near to prior level of function for ADLs and household management reporting I or Mod I with ADLs (dressing, feeding, grooming, toileting). ------progressing not met 8/16/2023     STG's (6 weeks)  1)   Patient to be IND with HEP and modalities for pain/edema managment. ------met 8/16/2023  2)   Increase ROM by 30 degrees in WF/WE to increase functional hand use for ADLs/work/leisure activities. ------met 8/16/2023  3)   Increase  strength by 25 lbs. to improve functional grasp for ADLs/work/leisure activities. ------progressing not met 8/16/2023       Plan     Updates/Grading for next session: continue to address goals     Mila Ayon, OT

## 2023-08-18 ENCOUNTER — CLINICAL SUPPORT (OUTPATIENT)
Dept: REHABILITATION | Facility: HOSPITAL | Age: 48
End: 2023-08-18
Payer: COMMERCIAL

## 2023-08-18 DIAGNOSIS — M25.532 LEFT WRIST PAIN: Primary | ICD-10-CM

## 2023-08-18 PROCEDURE — 97530 THERAPEUTIC ACTIVITIES: CPT | Mod: PO

## 2023-08-18 PROCEDURE — 97022 WHIRLPOOL THERAPY: CPT | Mod: PO

## 2023-08-18 NOTE — PROGRESS NOTES
[facility]  Occupational Therapy Treatment Note/Discharge Note     Date: 8/18/2023  Name: Srinivas Snyder Jr.  Clinic Number: [id number]    Therapy Diagnosis:   Encounter Diagnosis   Name Primary?    Left wrist pain Yes     Physician: Russo-Digeorge, Jamie L*  Medical Diagnosis:   M00.032 (ICD-10-CM) - Staphylococcal arthritis of left wrist   Z98.890 (ICD-10-CM) - Postoperative state      Physician Orders: eval and treat  Evaluation Date: 6/20/2023  Plan of Care Certification Date: 9/12/2023  Authorization Period: 12/29/2023  Surgery Date and Procedure: 5/24/2023  Date of Return to MD: ENRIQUE     Visit #: 9 of 20  Time In: 9:03  Time Out: 10:15  Total Billable Time: 42 min      Precautions: Standard      Subjective     Pt reports: Soreness from time to time but not bad  He was compliant with home exercise program given last session.   Response to previous treatment:good   Functional change: None reported    Pain: 0/10 today  Location: left hands      Objective     Edema: Circumferential measurements: In centimters       Left Right   PWC (Proximal Wrist Crease) 18.2  17.0       Range of Motion:   Left     Elbow and Wrist ROM. Measured in degrees.    6/20/2023 6/20/2023 7/28/23 8/9/23 8/16/23 8/18/23     Right Left Left   Left Left Left   Supination full full  -  full full full   Pronation full full  -  full full full   Wrist Ext 60 35  60* 60  69 69   Wrist Flex 65 25  55*  60 65 60   Wrist RD 9 9  14*  20 15 15   Wrist UD 40 15  29*  31 31 25    *After therapy    Hand ROM. Measured in degrees.    6/20/2023 8/16/23     Left Left       FULL AROM   Index: MP  0/69               PIP     0/83               DIP 0/34           Long:  MP 0/81               PIP 0/81               DIP 0/43           Ring:   MP 0/84               PIP 0/81               DIP 0/36           Small:  MP 0/90                PIP 0/80                DIP 0/38          Strength (Dynamometer) and Pinch Strength (Pinch  Gauge)  Measured in pounds.    6/20/2023 6/20/2023 8/16/23 8/19/23     Left Right Left Left   Rung II 27.1* 88.9 84.9 97.9   *Point of Pain    Treatment     Dong received the treatments listed below:      therapeutic activities to improve functional performance for 35  minutes, including:  Krystian balance x 2 min    Red flexbar pro/sup, WF/WE, UD/RD 20x each   Yellow digiextend 2'  Pink putty with #3 tooling to flatten and drag 2' each  Wrist PRE with #3 weight 3 ways x 20 ea   Dowel rotation with #1 wrist weight attached for forearm rotation 2'  Pink putty gripping 2 minutes    Dong received the following supervised modalities after being cleared for contraindications for 10 minutes in order to promote increased blood flow and tissue elasticity:   -Patient received fluidotherapy to LUE hand(s) for 10 minutes to increase blood flow, circulation, desensitization, sensory re-education and for pain management.     Patient Education and Home Exercises     Education provided:   - HEP- upgraded with pink putty  - scar massage and edema massage   - Progress towards goals     Written Home Exercises Provided: Patient instructed to cont prior HEP.  Exercises were reviewed and Dong was able to demonstrate them prior to the end of the session.  Dong demonstrated good  understanding of the HEP provided. See EMR under Patient Instructions for exercises provided during therapy sessions.       Assessment     Pt has improved in ROM and strength with good progress. Pt safe to d/c from skilled OT services with HEP and instructions to continue with gentle and gradual strengthening.    Anticipated barriers to occupational therapy: none     Goals:  LTG's (12 weeks):  1)   Increase ROM to WFL composite fist degrees to increase functional hand use for manipulating guitar strings. ------met 8/18/2023  2)   Increase ROM by 50 degrees in wrist flexion/extension to increase functional hand use for dressing skills. ------met  8/18/2023  3)   Increase  strength by 50 lbs. to grasp pot handle. ------met 8/18/2023  4)   Decrease complaints of pain to  0 out of 10 at worst to increase functional hand use for ADL/work/leisure activities. ------met 8/18/2023  5)   Pt will return to near to prior level of function for ADLs and household management reporting I or Mod I with ADLs (dressing, feeding, grooming, toileting). ------met 8/18/2023     STG's (6 weeks)  1)   Patient to be IND with HEP and modalities for pain/edema managment. ------met 8/18/2023  2)   Increase ROM by 30 degrees in WF/WE to increase functional hand use for ADLs/work/leisure activities. ------met 8/18/2023  3)   Increase  strength by 25 lbs. to improve functional grasp for ADLs/work/leisure activities. ------met 8/18/2023       Plan     D/C from skilled OT services.     Mila Ayon, OT

## 2023-09-12 ENCOUNTER — APPOINTMENT (OUTPATIENT)
Dept: LAB | Facility: HOSPITAL | Age: 48
End: 2023-09-12
Attending: INTERNAL MEDICINE
Payer: COMMERCIAL

## 2023-09-13 ENCOUNTER — OFFICE VISIT (OUTPATIENT)
Dept: INTERNAL MEDICINE | Facility: CLINIC | Age: 48
End: 2023-09-13
Payer: COMMERCIAL

## 2023-09-13 VITALS
BODY MASS INDEX: 24.49 KG/M2 | HEART RATE: 60 BPM | WEIGHT: 161.63 LBS | OXYGEN SATURATION: 99 % | HEIGHT: 68 IN | DIASTOLIC BLOOD PRESSURE: 64 MMHG | TEMPERATURE: 98 F | SYSTOLIC BLOOD PRESSURE: 110 MMHG | RESPIRATION RATE: 12 BRPM

## 2023-09-13 DIAGNOSIS — E03.4 HYPOTHYROIDISM DUE TO ACQUIRED ATROPHY OF THYROID: ICD-10-CM

## 2023-09-13 DIAGNOSIS — M00.032 STAPHYLOCOCCAL ARTHRITIS OF LEFT WRIST: ICD-10-CM

## 2023-09-13 DIAGNOSIS — E78.00 HYPERCHOLESTEREMIA: ICD-10-CM

## 2023-09-13 DIAGNOSIS — F98.8 ATTENTION DEFICIT DISORDER, UNSPECIFIED HYPERACTIVITY PRESENCE: Primary | ICD-10-CM

## 2023-09-13 PROCEDURE — 3008F PR BODY MASS INDEX (BMI) DOCUMENTED: ICD-10-PCS | Mod: CPTII,S$GLB,, | Performed by: INTERNAL MEDICINE

## 2023-09-13 PROCEDURE — 99999 PR PBB SHADOW E&M-EST. PATIENT-LVL III: ICD-10-PCS | Mod: PBBFAC,,, | Performed by: INTERNAL MEDICINE

## 2023-09-13 PROCEDURE — 3078F PR MOST RECENT DIASTOLIC BLOOD PRESSURE < 80 MM HG: ICD-10-PCS | Mod: CPTII,S$GLB,, | Performed by: INTERNAL MEDICINE

## 2023-09-13 PROCEDURE — 3044F PR MOST RECENT HEMOGLOBIN A1C LEVEL <7.0%: ICD-10-PCS | Mod: CPTII,S$GLB,, | Performed by: INTERNAL MEDICINE

## 2023-09-13 PROCEDURE — 99214 OFFICE O/P EST MOD 30 MIN: CPT | Mod: S$GLB,,, | Performed by: INTERNAL MEDICINE

## 2023-09-13 PROCEDURE — 3008F BODY MASS INDEX DOCD: CPT | Mod: CPTII,S$GLB,, | Performed by: INTERNAL MEDICINE

## 2023-09-13 PROCEDURE — 3074F PR MOST RECENT SYSTOLIC BLOOD PRESSURE < 130 MM HG: ICD-10-PCS | Mod: CPTII,S$GLB,, | Performed by: INTERNAL MEDICINE

## 2023-09-13 PROCEDURE — 99999 PR PBB SHADOW E&M-EST. PATIENT-LVL III: CPT | Mod: PBBFAC,,, | Performed by: INTERNAL MEDICINE

## 2023-09-13 PROCEDURE — 99214 PR OFFICE/OUTPT VISIT, EST, LEVL IV, 30-39 MIN: ICD-10-PCS | Mod: S$GLB,,, | Performed by: INTERNAL MEDICINE

## 2023-09-13 PROCEDURE — 3078F DIAST BP <80 MM HG: CPT | Mod: CPTII,S$GLB,, | Performed by: INTERNAL MEDICINE

## 2023-09-13 PROCEDURE — 3044F HG A1C LEVEL LT 7.0%: CPT | Mod: CPTII,S$GLB,, | Performed by: INTERNAL MEDICINE

## 2023-09-13 PROCEDURE — 3074F SYST BP LT 130 MM HG: CPT | Mod: CPTII,S$GLB,, | Performed by: INTERNAL MEDICINE

## 2023-09-13 RX ORDER — LISDEXAMFETAMINE DIMESYLATE 30 MG/1
30 CAPSULE ORAL EVERY MORNING
Qty: 30 CAPSULE | Refills: 0 | Status: SHIPPED | OUTPATIENT
Start: 2023-10-13 | End: 2023-12-13 | Stop reason: SDUPTHER

## 2023-09-13 RX ORDER — LISDEXAMFETAMINE DIMESYLATE 30 MG/1
30 CAPSULE ORAL EVERY MORNING
Qty: 30 CAPSULE | Refills: 0 | Status: SHIPPED | OUTPATIENT
Start: 2023-11-13 | End: 2023-12-13 | Stop reason: SDUPTHER

## 2023-09-13 RX ORDER — LISDEXAMFETAMINE DIMESYLATE 30 MG/1
30 CAPSULE ORAL EVERY MORNING
Qty: 30 CAPSULE | Refills: 0 | Status: SHIPPED | OUTPATIENT
Start: 2023-09-13 | End: 2023-12-13 | Stop reason: SDUPTHER

## 2023-09-13 NOTE — PROGRESS NOTES
Subjective     Patient ID: Srinivas Snyder Jr. is a 47 y.o. male.    Chief Complaint: Follow-up    HPI  Pt with ADD, Hypothyroidism, HLD, Hx of left wrist septic arthritis is here for f/u. Requesting refills of his Vyvanse which has been controlling his symptoms. No med SE's.  Review of Systems   Constitutional:  Negative for activity change, appetite change, chills, diaphoresis, fatigue, fever and unexpected weight change.   HENT:  Negative for postnasal drip, rhinorrhea, sinus pressure/congestion, sneezing, sore throat, trouble swallowing and voice change.    Respiratory:  Negative for cough, shortness of breath and wheezing.    Cardiovascular:  Negative for chest pain, palpitations and leg swelling.   Gastrointestinal:  Negative for abdominal pain, blood in stool, constipation, diarrhea, nausea and vomiting.   Genitourinary:  Negative for dysuria.   Musculoskeletal:  Negative for arthralgias and myalgias.   Integumentary:  Negative for rash and wound.   Allergic/Immunologic: Negative for environmental allergies and food allergies.   Hematological:  Negative for adenopathy. Does not bruise/bleed easily.   Psychiatric/Behavioral:  Positive for decreased concentration. Negative for self-injury and suicidal ideas.           Objective     Physical Exam  Constitutional:       General: He is not in acute distress.     Appearance: Normal appearance. He is well-developed. He is not diaphoretic.   HENT:      Head: Normocephalic and atraumatic.      Right Ear: External ear normal.      Left Ear: External ear normal.      Nose: Nose normal.      Mouth/Throat:      Pharynx: No oropharyngeal exudate.   Eyes:      General: No scleral icterus.        Right eye: No discharge.         Left eye: No discharge.      Conjunctiva/sclera: Conjunctivae normal.      Pupils: Pupils are equal, round, and reactive to light.   Neck:      Vascular: No JVD.   Cardiovascular:      Rate and Rhythm: Normal rate and regular rhythm.       Pulses: Normal pulses.      Heart sounds: Normal heart sounds. No murmur heard.  Pulmonary:      Effort: Pulmonary effort is normal. No respiratory distress.      Breath sounds: Normal breath sounds. No wheezing or rales.   Abdominal:      General: Bowel sounds are normal.      Tenderness: There is no abdominal tenderness. There is no guarding or rebound.   Musculoskeletal:      Cervical back: Normal range of motion and neck supple.      Right lower leg: No edema.      Left lower leg: No edema.   Lymphadenopathy:      Cervical: No cervical adenopathy.   Skin:     General: Skin is warm and dry.      Capillary Refill: Capillary refill takes less than 2 seconds.      Coloration: Skin is not pale.      Findings: No rash.   Neurological:      Mental Status: He is alert and oriented to person, place, and time. Mental status is at baseline.      Cranial Nerves: No cranial nerve deficit.   Psychiatric:         Mood and Affect: Mood normal.         Behavior: Behavior normal.            Assessment and Plan      1. Attention deficit disorder, unspecified hyperactivity presence  -     lisdexamfetamine (VYVANSE) 30 MG capsule; Take 1 capsule (30 mg total) by mouth every morning.  Dispense: 30 capsule; Refill: 0  -     lisdexamfetamine (VYVANSE) 30 MG capsule; Take 1 capsule (30 mg total) by mouth every morning.  Dispense: 30 capsule; Refill: 0  -     lisdexamfetamine (VYVANSE) 30 MG capsule; Take 1 capsule (30 mg total) by mouth every morning.  Dispense: 30 capsule; Refill: 0    2. Hypercholesteremia    3. Hypothyroidism due to acquired atrophy of thyroid  -     TSH; Future; Expected date: 09/13/2023  -     T4, Free; Future; Expected date: 09/13/2023    4. Staphylococcal arthritis of left wrist         ADD- stable, refill Vyvanse 30 mg qam      Hypothyroidism- pt advised on the importance of taking his Synthroid 137 mcg daily      HLD- controlled on Lipitor      Hx of Septic arthritis of left wrist- s/p arthroscopy/IV Abx      F/u in 3 months

## 2023-09-22 ENCOUNTER — CLINICAL SUPPORT (OUTPATIENT)
Dept: ENDOSCOPY | Facility: HOSPITAL | Age: 48
End: 2023-09-22
Attending: INTERNAL MEDICINE
Payer: COMMERCIAL

## 2023-09-22 DIAGNOSIS — Z12.11 COLON CANCER SCREENING: ICD-10-CM

## 2023-09-22 NOTE — PLAN OF CARE
We attempted to reach  you   to schedule your procedure. We are sorry that we missed you. Please contact Endoscopy  Scheduling Department to reschedule another PAT or call us at [phone] for assistance. Thank you.

## 2023-09-25 ENCOUNTER — PATIENT MESSAGE (OUTPATIENT)
Dept: INTERNAL MEDICINE | Facility: CLINIC | Age: 48
End: 2023-09-25
Payer: COMMERCIAL

## 2023-09-25 DIAGNOSIS — F98.8 ATTENTION DEFICIT DISORDER, UNSPECIFIED HYPERACTIVITY PRESENCE: Primary | ICD-10-CM

## 2023-10-16 DIAGNOSIS — E78.00 HYPERCHOLESTEREMIA: ICD-10-CM

## 2023-10-16 RX ORDER — ATORVASTATIN CALCIUM 20 MG/1
20 TABLET, FILM COATED ORAL DAILY
Qty: 90 TABLET | Refills: 3 | Status: SHIPPED | OUTPATIENT
Start: 2023-10-16

## 2023-10-16 NOTE — TELEPHONE ENCOUNTER
No care due was identified.  [facility] Embedded Care Due Messages. Reference number: [id number].   10/16/2023 9:50:56 AM CDT

## 2023-12-11 ENCOUNTER — TELEPHONE (OUTPATIENT)
Dept: SPORTS MEDICINE | Facility: CLINIC | Age: 48
End: 2023-12-11
Payer: COMMERCIAL

## 2023-12-11 NOTE — TELEPHONE ENCOUNTER
Called patient and LVM in regards to moving patient to another date due to Bentley not being in clinic at time

## 2023-12-13 ENCOUNTER — OFFICE VISIT (OUTPATIENT)
Dept: INTERNAL MEDICINE | Facility: CLINIC | Age: 48
End: 2023-12-13
Payer: COMMERCIAL

## 2023-12-13 VITALS
BODY MASS INDEX: 25.14 KG/M2 | DIASTOLIC BLOOD PRESSURE: 88 MMHG | HEART RATE: 65 BPM | TEMPERATURE: 97 F | OXYGEN SATURATION: 99 % | SYSTOLIC BLOOD PRESSURE: 136 MMHG | HEIGHT: 68 IN | WEIGHT: 165.88 LBS

## 2023-12-13 DIAGNOSIS — E03.4 HYPOTHYROIDISM DUE TO ACQUIRED ATROPHY OF THYROID: ICD-10-CM

## 2023-12-13 DIAGNOSIS — E78.00 HYPERCHOLESTEREMIA: ICD-10-CM

## 2023-12-13 DIAGNOSIS — M00.032 STAPHYLOCOCCAL ARTHRITIS OF LEFT WRIST: ICD-10-CM

## 2023-12-13 DIAGNOSIS — F98.8 ATTENTION DEFICIT DISORDER, UNSPECIFIED HYPERACTIVITY PRESENCE: Primary | ICD-10-CM

## 2023-12-13 PROCEDURE — 99214 PR OFFICE/OUTPT VISIT, EST, LEVL IV, 30-39 MIN: ICD-10-PCS | Mod: S$GLB,,, | Performed by: INTERNAL MEDICINE

## 2023-12-13 PROCEDURE — 3044F HG A1C LEVEL LT 7.0%: CPT | Mod: CPTII,S$GLB,, | Performed by: INTERNAL MEDICINE

## 2023-12-13 PROCEDURE — 1160F RVW MEDS BY RX/DR IN RCRD: CPT | Mod: CPTII,S$GLB,, | Performed by: INTERNAL MEDICINE

## 2023-12-13 PROCEDURE — 3008F BODY MASS INDEX DOCD: CPT | Mod: CPTII,S$GLB,, | Performed by: INTERNAL MEDICINE

## 2023-12-13 PROCEDURE — 99214 OFFICE O/P EST MOD 30 MIN: CPT | Mod: S$GLB,,, | Performed by: INTERNAL MEDICINE

## 2023-12-13 PROCEDURE — 3079F PR MOST RECENT DIASTOLIC BLOOD PRESSURE 80-89 MM HG: ICD-10-PCS | Mod: CPTII,S$GLB,, | Performed by: INTERNAL MEDICINE

## 2023-12-13 PROCEDURE — 1159F PR MEDICATION LIST DOCUMENTED IN MEDICAL RECORD: ICD-10-PCS | Mod: CPTII,S$GLB,, | Performed by: INTERNAL MEDICINE

## 2023-12-13 PROCEDURE — 1160F PR REVIEW ALL MEDS BY PRESCRIBER/CLIN PHARMACIST DOCUMENTED: ICD-10-PCS | Mod: CPTII,S$GLB,, | Performed by: INTERNAL MEDICINE

## 2023-12-13 PROCEDURE — 3008F PR BODY MASS INDEX (BMI) DOCUMENTED: ICD-10-PCS | Mod: CPTII,S$GLB,, | Performed by: INTERNAL MEDICINE

## 2023-12-13 PROCEDURE — 99999 PR PBB SHADOW E&M-EST. PATIENT-LVL III: CPT | Mod: PBBFAC,,, | Performed by: INTERNAL MEDICINE

## 2023-12-13 PROCEDURE — 3075F SYST BP GE 130 - 139MM HG: CPT | Mod: CPTII,S$GLB,, | Performed by: INTERNAL MEDICINE

## 2023-12-13 PROCEDURE — 99999 PR PBB SHADOW E&M-EST. PATIENT-LVL III: ICD-10-PCS | Mod: PBBFAC,,, | Performed by: INTERNAL MEDICINE

## 2023-12-13 PROCEDURE — 1159F MED LIST DOCD IN RCRD: CPT | Mod: CPTII,S$GLB,, | Performed by: INTERNAL MEDICINE

## 2023-12-13 PROCEDURE — 3079F DIAST BP 80-89 MM HG: CPT | Mod: CPTII,S$GLB,, | Performed by: INTERNAL MEDICINE

## 2023-12-13 PROCEDURE — 3075F PR MOST RECENT SYSTOLIC BLOOD PRESS GE 130-139MM HG: ICD-10-PCS | Mod: CPTII,S$GLB,, | Performed by: INTERNAL MEDICINE

## 2023-12-13 PROCEDURE — 3044F PR MOST RECENT HEMOGLOBIN A1C LEVEL <7.0%: ICD-10-PCS | Mod: CPTII,S$GLB,, | Performed by: INTERNAL MEDICINE

## 2023-12-13 RX ORDER — LISDEXAMFETAMINE DIMESYLATE 30 MG/1
30 CAPSULE ORAL EVERY MORNING
Qty: 30 CAPSULE | Refills: 0 | Status: SHIPPED | OUTPATIENT
Start: 2024-02-13

## 2023-12-13 RX ORDER — LISDEXAMFETAMINE DIMESYLATE 30 MG/1
30 CAPSULE ORAL EVERY MORNING
Qty: 30 CAPSULE | Refills: 0 | Status: SHIPPED | OUTPATIENT
Start: 2024-01-13

## 2023-12-13 RX ORDER — LISDEXAMFETAMINE DIMESYLATE 30 MG/1
30 CAPSULE ORAL EVERY MORNING
Qty: 30 CAPSULE | Refills: 0 | Status: SHIPPED | OUTPATIENT
Start: 2023-12-13

## 2023-12-13 NOTE — PROGRESS NOTES
Subjective     Patient ID: Srinivas Snyder Jr. is a 48 y.o. male.    Chief Complaint: Follow-up    HPI  Pt with ADD, Hypothyroidism, HLD, Hx of left wrist septic arthritis is here for f/u. Requesting refills of his Vyvanse which has been controlling his symptoms. No med SE's.   Review of Systems   Constitutional:  Negative for activity change, appetite change, chills, diaphoresis, fatigue, fever and unexpected weight change.   HENT:  Negative for postnasal drip, rhinorrhea, sinus pressure/congestion, sneezing, sore throat, trouble swallowing and voice change.    Respiratory:  Negative for cough, shortness of breath and wheezing.    Cardiovascular:  Negative for chest pain, palpitations and leg swelling.   Gastrointestinal:  Negative for abdominal pain, blood in stool, constipation, diarrhea, nausea and vomiting.   Genitourinary:  Negative for dysuria.   Musculoskeletal:  Negative for arthralgias and myalgias.   Integumentary:  Negative for rash and wound.   Allergic/Immunologic: Negative for environmental allergies and food allergies.   Hematological:  Negative for adenopathy. Does not bruise/bleed easily.   Psychiatric/Behavioral:  Positive for decreased concentration. Negative for self-injury and suicidal ideas.           Objective     Physical Exam  Constitutional:       General: He is not in acute distress.     Appearance: Normal appearance. He is well-developed. He is not diaphoretic.   HENT:      Head: Normocephalic and atraumatic.      Right Ear: External ear normal.      Left Ear: External ear normal.      Nose: Nose normal.      Mouth/Throat:      Pharynx: No oropharyngeal exudate.   Eyes:      General: No scleral icterus.        Right eye: No discharge.         Left eye: No discharge.      Conjunctiva/sclera: Conjunctivae normal.      Pupils: Pupils are equal, round, and reactive to light.   Neck:      Vascular: No JVD.   Cardiovascular:      Rate and Rhythm: Normal rate and regular rhythm.       Pulses: Normal pulses.      Heart sounds: Normal heart sounds. No murmur heard.  Pulmonary:      Effort: Pulmonary effort is normal. No respiratory distress.      Breath sounds: Normal breath sounds. No wheezing or rales.   Abdominal:      General: Bowel sounds are normal.      Tenderness: There is no abdominal tenderness. There is no guarding or rebound.   Musculoskeletal:      Cervical back: Normal range of motion and neck supple.      Right lower leg: No edema.      Left lower leg: No edema.   Lymphadenopathy:      Cervical: No cervical adenopathy.   Skin:     General: Skin is warm and dry.      Capillary Refill: Capillary refill takes less than 2 seconds.      Coloration: Skin is not pale.      Findings: No rash.   Neurological:      Mental Status: He is alert and oriented to person, place, and time. Mental status is at baseline.      Cranial Nerves: No cranial nerve deficit.   Psychiatric:         Mood and Affect: Mood normal.         Behavior: Behavior normal.            Assessment and Plan     1. Attention deficit disorder, unspecified hyperactivity presence  -     lisdexamfetamine (VYVANSE) 30 MG capsule; Take 1 capsule (30 mg total) by mouth every morning.  Dispense: 30 capsule; Refill: 0  -     lisdexamfetamine (VYVANSE) 30 MG capsule; Take 1 capsule (30 mg total) by mouth every morning.  Dispense: 30 capsule; Refill: 0  -     lisdexamfetamine (VYVANSE) 30 MG capsule; Take 1 capsule (30 mg total) by mouth every morning.  Dispense: 30 capsule; Refill: 0    2. Hypothyroidism due to acquired atrophy of thyroid    3. Staphylococcal arthritis of left wrist    4. Hypercholesteremia         ADD- stable, refill Vyvanse 30 mg qam      Hypothyroidism- pt advised on the importance of taking his Synthroid 137 mcg daily      HLD- controlled on Lipitor      Hx of Septic arthritis of left wrist- s/p arthroscopy/IV Abx      F/u in 3 months

## 2024-01-27 ENCOUNTER — OFFICE VISIT (OUTPATIENT)
Dept: URGENT CARE | Facility: CLINIC | Age: 49
End: 2024-01-27
Payer: COMMERCIAL

## 2024-01-27 VITALS
HEIGHT: 68 IN | RESPIRATION RATE: 18 BRPM | OXYGEN SATURATION: 97 % | DIASTOLIC BLOOD PRESSURE: 88 MMHG | HEART RATE: 79 BPM | WEIGHT: 165 LBS | SYSTOLIC BLOOD PRESSURE: 127 MMHG | BODY MASS INDEX: 25.01 KG/M2 | TEMPERATURE: 98 F

## 2024-01-27 DIAGNOSIS — J20.9 ACUTE BRONCHITIS, UNSPECIFIED ORGANISM: Primary | ICD-10-CM

## 2024-01-27 PROBLEM — H90.3 SENSORY HEARING LOSS, BILATERAL: Status: ACTIVE | Noted: 2024-01-27

## 2024-01-27 PROCEDURE — 99213 OFFICE O/P EST LOW 20 MIN: CPT | Mod: S$GLB,,, | Performed by: PHYSICIAN ASSISTANT

## 2024-01-27 RX ORDER — PROMETHAZINE HYDROCHLORIDE AND DEXTROMETHORPHAN HYDROBROMIDE 6.25; 15 MG/5ML; MG/5ML
5 SYRUP ORAL NIGHTLY PRN
Qty: 50 ML | Refills: 0 | Status: SHIPPED | OUTPATIENT
Start: 2024-01-27 | End: 2024-02-06

## 2024-01-27 RX ORDER — PREDNISONE 10 MG/1
TABLET ORAL
Qty: 24 TABLET | Refills: 0 | Status: SHIPPED | OUTPATIENT
Start: 2024-01-27 | End: 2024-02-04

## 2024-01-27 RX ORDER — BENZONATATE 200 MG/1
200 CAPSULE ORAL 3 TIMES DAILY PRN
Qty: 30 CAPSULE | Refills: 0 | Status: SHIPPED | OUTPATIENT
Start: 2024-01-27 | End: 2024-02-06

## 2024-01-27 NOTE — PATIENT INSTRUCTIONS
This is a viral infection antibiotics will not be helpful against it. Use steroids  with food and cough suppressants as we discussed. Steroids can elevate your blood pressure, elevate your blood sugar, water weight gain, nervous energy, redness to the face.  Use Tessalon during the day for cough as needed.  Use cough syrup at night this can be sedating please do not drink alcohol or drive with this medication  Bronchitis can last for several weeks to several months. If your symptoms should worsen please return to the urgent care or go the emergency department for further evaluation. Use cough drops and warm salt gargles as needed for sore throat. Tylenol/ibuprofen as needed for pain/fevers. Drink plenty of fluids and get plenty of rest. F/u as needed

## 2024-01-27 NOTE — PROGRESS NOTES
"Subjective:      Patient ID: Srinivas Snyder Jr. is a 48 y.o. male.    Vitals:  height is 5' 8" (1.727 m) and weight is 74.8 kg (165 lb). His oral temperature is 97.9 °F (36.6 °C). His blood pressure is 127/88 and his pulse is 79. His respiration is 18 and oxygen saturation is 97%.     Chief Complaint: Cough    This is a 48 y.o. male who presents today with a chief complaint of cough. Patient states he been dealing with this cough since New Years. Patient went to urgent car the week after New Years and was tested for Covid and Flu and both test were negative. Patient has trouble sleeping at night.     Cough  This is a new problem. The current episode started more than 1 month ago. The cough is Non-productive. Associated symptoms include a sore throat. Pertinent negatives include no chest pain, chills, ear pain, fever, headaches, myalgias, postnasal drip, rash, shortness of breath or wheezing. The symptoms are aggravated by lying down. Treatments tried: Aleve. The treatment provided mild relief.     Constitution: Negative for appetite change, chills, sweating, fatigue and fever.   HENT:  Positive for sore throat. Negative for ear pain, ear discharge, tinnitus, hearing loss, dental problem, drooling, mouth sores, tongue pain, tongue lesion, facial swelling, congestion, postnasal drip, sinus pain, sinus pressure, trouble swallowing and voice change.    Neck: Negative for neck pain, neck stiffness and painful lymph nodes.   Cardiovascular:  Negative for chest pain and sob on exertion.   Eyes:  Negative for eye discharge and eye itching.   Respiratory:  Positive for cough. Negative for chest tightness, sputum production, shortness of breath and wheezing.    Gastrointestinal:  Negative for abdominal pain, nausea, vomiting, constipation and diarrhea.   Musculoskeletal:  Negative for muscle cramps and muscle ache.   Skin:  Negative for rash.   Neurological:  Negative for dizziness, headaches and altered mental status. "   Hematologic/Lymphatic: Negative for swollen lymph nodes.   Psychiatric/Behavioral:  Negative for altered mental status.       Past Medical History:   Diagnosis Date    Anxiety     Hyperlipidemia     Hypothyroidism        Past Surgical History:   Procedure Laterality Date    ARTHROSCOPIC DEBRIDEMENT OF WRIST Left 5/24/2023    Procedure: ARTHROSCOPY, WRIST, WITH DEBRIDEMENT;  Surgeon: Dontae Dewitt MD;  Location: [facility] OR [address] [place] [address];  Service: Orthopedics;  Laterality: Left;       Family History   Problem Relation Age of Onset    Hyperlipidemia Mother     Hyperlipidemia Father     Diabetes Neg Hx     Hypertension Neg Hx     Heart disease Neg Hx     Stroke Neg Hx        Social History     Socioeconomic History    Marital status:     Number of children: 1   Occupational History    Occupation: IT support   Tobacco Use    Smoking status: Never    Smokeless tobacco: Never   Substance and Sexual Activity    Alcohol use: Yes     Alcohol/week: 0.0 standard drinks of alcohol     Comment: Social     Drug use: No    Sexual activity: Yes     Partners: Female     Social Determinants of Health     Financial Resource Strain: Low Risk  (9/11/2023)    Overall Financial Resource Strain (CARDIA)     Difficulty of Paying Living Expenses: Not very hard   Food Insecurity: No Food Insecurity (9/11/2023)    Hunger Vital Sign     Worried About Running Out of Food in the Last Year: Never true     Ran Out of Food in the Last Year: Never true   Transportation Needs: No Transportation Needs (9/11/2023)    PRAPARE - Transportation     Lack of Transportation (Medical): No     Lack of Transportation (Non-Medical): No   Physical Activity: Insufficiently Active (9/11/2023)    Exercise Vital Sign     Days of Exercise per Week: 3 days     Minutes of Exercise per Session: 30 min   Stress: No Stress Concern Present (9/11/2023)    [place] [employer] Occupational [employer] - Occupational Stress Questionnaire     Feeling of Stress : Only a little    Social Connections: Moderately Isolated (9/11/2023)    Social Connection and Isolation Panel [NHANES]     Frequency of Communication with Friends and Family: Twice a week     Frequency of Social Gatherings with Friends and Family: Once a week     Attends Yazidi Services: Never     Active Member of Clubs or Organizations: No     Attends Club or Organization Meetings: Never     Marital Status:    Housing Stability: Low Risk  (9/11/2023)    Housing Stability Vital Sign     Unable to Pay for Housing in the Last Year: No     Number of Places Lived in the Last Year: 1     Unstable Housing in the Last Year: No       Current Outpatient Medications   Medication Sig Dispense Refill    atorvastatin (LIPITOR) 20 MG tablet Take 1 tablet (20 mg total) by mouth once daily. 90 tablet 3    ibuprofen (ADVIL,MOTRIN) 600 MG tablet Take 1 tablet (600 mg total) by mouth every 6 (six) hours as needed for Pain. 28 tablet 0    indomethacin (INDOCIN) 50 MG capsule Take 1 capsule (50 mg total) by mouth 3 (three) times daily. 90 capsule 0    levothyroxine (SYNTHROID) 137 MCG Tab tablet Take 1 tablet (137 mcg total) by mouth before breakfast. 90 tablet 3    [START ON 2/13/2024] lisdexamfetamine (VYVANSE) 30 MG capsule Take 1 capsule (30 mg total) by mouth every morning. 30 capsule 0    lisdexamfetamine (VYVANSE) 30 MG capsule Take 1 capsule (30 mg total) by mouth every morning. 30 capsule 0    lisdexamfetamine (VYVANSE) 30 MG capsule Take 1 capsule (30 mg total) by mouth every morning. 30 capsule 0    methocarbamoL (ROBAXIN) 750 MG Tab Take 1 tablet (750 mg total) by mouth 3 (three) times daily as needed (shoulder/back pain). 40 tablet 0    traMADoL (ULTRAM) 50 mg tablet Take 1 tablet (50 mg total) by mouth every 6 (six) hours as needed for Pain. 20 tablet 0    benzonatate (TESSALON) 200 MG capsule Take 1 capsule (200 mg total) by mouth 3 (three) times daily as needed for Cough. 30 capsule 0    predniSONE (DELTASONE) 10 MG tablet  Take 4 tablets (40 mg total) by mouth once daily for 3 days, THEN 3 tablets (30 mg total) once daily for 2 days, THEN 2 tablets (20 mg total) once daily for 2 days, THEN 1 tablet (10 mg total) once daily for 2 days. 24 tablet 0    promethazine-dextromethorphan (PROMETHAZINE-DM) 6.25-15 mg/5 mL Syrp Take 5 mLs by mouth nightly as needed (cough). 50 mL 0     No current facility-administered medications for this visit.       Review of patient's allergies indicates:  No Known Allergies    Objective:     Physical Exam   Constitutional: He is oriented to person, place, and time. He appears well-developed. He is cooperative.  Non-toxic appearance. He does not appear ill. No distress.   HENT:   Head: Normocephalic and atraumatic.   Ears:   Right Ear: Hearing, tympanic membrane, external ear and ear canal normal. impacted cerumen  Left Ear: Hearing, tympanic membrane, external ear and ear canal normal. impacted cerumen  Nose: Nose normal. No mucosal edema, rhinorrhea, nasal deformity or congestion. No epistaxis. Right sinus exhibits no maxillary sinus tenderness and no frontal sinus tenderness. Left sinus exhibits no maxillary sinus tenderness and no frontal sinus tenderness.   Mouth/Throat: Uvula is midline, oropharynx is clear and moist and mucous membranes are normal. No trismus in the jaw. Normal dentition. No uvula swelling. No oropharyngeal exudate, posterior oropharyngeal edema or posterior oropharyngeal erythema.   Eyes: Conjunctivae and lids are normal. Right eye exhibits no discharge. Left eye exhibits no discharge. No scleral icterus.   Neck: Trachea normal and phonation normal. Neck supple. No edema present. No erythema present. No neck rigidity present.   Cardiovascular: Normal rate, regular rhythm, normal heart sounds and normal pulses.   No murmur heard.Exam reveals no gallop and no friction rub.   Pulmonary/Chest: Effort normal and breath sounds normal. No stridor. No respiratory distress. He has no decreased  breath sounds. He has no wheezes. He has no rhonchi. He has no rales.   Abdominal: Normal appearance.   Musculoskeletal:      Cervical back: He exhibits no tenderness.   Lymphadenopathy:     He has no cervical adenopathy.   Neurological: He is alert and oriented to person, place, and time. He exhibits normal muscle tone.   Skin: Skin is warm, dry, intact, not diaphoretic, not pale and no rash.   Psychiatric: His speech is normal and behavior is normal. Mood, judgment and thought content normal.   Nursing note and vitals reviewed.      Assessment:     1. Acute bronchitis, unspecified organism        Plan:       Acute bronchitis, unspecified organism  -     predniSONE (DELTASONE) 10 MG tablet; Take 4 tablets (40 mg total) by mouth once daily for 3 days, THEN 3 tablets (30 mg total) once daily for 2 days, THEN 2 tablets (20 mg total) once daily for 2 days, THEN 1 tablet (10 mg total) once daily for 2 days.  Dispense: 24 tablet; Refill: 0  -     benzonatate (TESSALON) 200 MG capsule; Take 1 capsule (200 mg total) by mouth 3 (three) times daily as needed for Cough.  Dispense: 30 capsule; Refill: 0  -     promethazine-dextromethorphan (PROMETHAZINE-DM) 6.25-15 mg/5 mL Syrp; Take 5 mLs by mouth nightly as needed (cough).  Dispense: 50 mL; Refill: 0    I have reviewed the patient chart and pertinent past imaging/labs.  Patient Instructions   This is a viral infection antibiotics will not be helpful against it. Use steroids  with food and cough suppressants as we discussed. Steroids can elevate your blood pressure, elevate your blood sugar, water weight gain, nervous energy, redness to the face.  Use Tessalon during the day for cough as needed.  Use cough syrup at night this can be sedating please do not drink alcohol or drive with this medication  Bronchitis can last for several weeks to several months. If your symptoms should worsen please return to the urgent care or go the emergency department for further evaluation. Use  cough drops and warm salt gargles as needed for sore throat. Tylenol/ibuprofen as needed for pain/fevers. Drink plenty of fluids and get plenty of rest. F/u as needed

## 2024-01-29 ENCOUNTER — PATIENT MESSAGE (OUTPATIENT)
Dept: INTERNAL MEDICINE | Facility: CLINIC | Age: 49
End: 2024-01-29
Payer: COMMERCIAL

## 2024-04-05 ENCOUNTER — OFFICE VISIT (OUTPATIENT)
Dept: INTERNAL MEDICINE | Facility: CLINIC | Age: 49
End: 2024-04-05
Payer: COMMERCIAL

## 2024-04-05 VITALS
HEIGHT: 68 IN | DIASTOLIC BLOOD PRESSURE: 80 MMHG | OXYGEN SATURATION: 99 % | BODY MASS INDEX: 25.44 KG/M2 | WEIGHT: 167.88 LBS | HEART RATE: 73 BPM | SYSTOLIC BLOOD PRESSURE: 138 MMHG | TEMPERATURE: 98 F

## 2024-04-05 DIAGNOSIS — Z12.11 COLON CANCER SCREENING: ICD-10-CM

## 2024-04-05 DIAGNOSIS — E03.4 HYPOTHYROIDISM DUE TO ACQUIRED ATROPHY OF THYROID: ICD-10-CM

## 2024-04-05 DIAGNOSIS — E78.00 HYPERCHOLESTEREMIA: ICD-10-CM

## 2024-04-05 DIAGNOSIS — M00.032 STAPHYLOCOCCAL ARTHRITIS OF LEFT WRIST: ICD-10-CM

## 2024-04-05 DIAGNOSIS — F98.8 ATTENTION DEFICIT DISORDER, UNSPECIFIED HYPERACTIVITY PRESENCE: Primary | ICD-10-CM

## 2024-04-05 PROCEDURE — 3079F DIAST BP 80-89 MM HG: CPT | Mod: CPTII,S$GLB,, | Performed by: INTERNAL MEDICINE

## 2024-04-05 PROCEDURE — 3075F SYST BP GE 130 - 139MM HG: CPT | Mod: CPTII,S$GLB,, | Performed by: INTERNAL MEDICINE

## 2024-04-05 PROCEDURE — 99214 OFFICE O/P EST MOD 30 MIN: CPT | Mod: S$GLB,,, | Performed by: INTERNAL MEDICINE

## 2024-04-05 PROCEDURE — 1160F RVW MEDS BY RX/DR IN RCRD: CPT | Mod: CPTII,S$GLB,, | Performed by: INTERNAL MEDICINE

## 2024-04-05 PROCEDURE — 3008F BODY MASS INDEX DOCD: CPT | Mod: CPTII,S$GLB,, | Performed by: INTERNAL MEDICINE

## 2024-04-05 PROCEDURE — 99999 PR PBB SHADOW E&M-EST. PATIENT-LVL III: CPT | Mod: PBBFAC,,, | Performed by: INTERNAL MEDICINE

## 2024-04-05 PROCEDURE — 1159F MED LIST DOCD IN RCRD: CPT | Mod: CPTII,S$GLB,, | Performed by: INTERNAL MEDICINE

## 2024-04-05 RX ORDER — LISDEXAMFETAMINE DIMESYLATE 40 MG/1
40 CAPSULE ORAL DAILY
Qty: 30 CAPSULE | Refills: 0 | Status: SHIPPED | OUTPATIENT
Start: 2024-04-05 | End: 2024-05-01

## 2024-04-05 NOTE — PROGRESS NOTES
Subjective     Patient ID: Srinivas Snyder Jr. is a 48 y.o. male.    Chief Complaint: Medication Refill    HPI  Pt with ADD, Hypothyroidism, HLD, Hx of left wrist septic arthritis is here for f/u. Requesting refills of his Vyvanse which has been controlling his symptoms but it is not lasting all day. He would like to increase his dose. No med SE's.   Review of Systems   Constitutional:  Negative for activity change and unexpected weight change.   HENT:  Negative for hearing loss, rhinorrhea and trouble swallowing.    Eyes:  Negative for discharge and visual disturbance.   Respiratory:  Negative for chest tightness and wheezing.    Cardiovascular:  Negative for chest pain and palpitations.   Gastrointestinal:  Negative for blood in stool, constipation, diarrhea and vomiting.   Endocrine: Negative for polydipsia and polyuria.   Genitourinary:  Negative for difficulty urinating, hematuria and urgency.   Musculoskeletal:  Negative for arthralgias, joint swelling and neck pain.   Neurological:  Negative for weakness and headaches.   Psychiatric/Behavioral:  Positive for decreased concentration. Negative for confusion, dysphoric mood, self-injury and suicidal ideas.           Objective     Physical Exam  Constitutional:       General: He is not in acute distress.     Appearance: Normal appearance. He is well-developed. He is not diaphoretic.   HENT:      Head: Normocephalic and atraumatic.      Right Ear: External ear normal.      Left Ear: External ear normal.      Nose: Nose normal.      Mouth/Throat:      Pharynx: No oropharyngeal exudate.   Eyes:      General: No scleral icterus.        Right eye: No discharge.         Left eye: No discharge.      Conjunctiva/sclera: Conjunctivae normal.      Pupils: Pupils are equal, round, and reactive to light.   Neck:      Vascular: No JVD.   Cardiovascular:      Rate and Rhythm: Normal rate and regular rhythm.      Pulses: Normal pulses.      Heart sounds: Normal heart  sounds. No murmur heard.  Pulmonary:      Effort: Pulmonary effort is normal. No respiratory distress.      Breath sounds: Normal breath sounds. No wheezing or rales.   Abdominal:      General: Bowel sounds are normal.      Tenderness: There is no abdominal tenderness. There is no guarding or rebound.   Musculoskeletal:      Cervical back: Normal range of motion and neck supple.      Right lower leg: No edema.      Left lower leg: No edema.   Lymphadenopathy:      Cervical: No cervical adenopathy.   Skin:     General: Skin is warm and dry.      Capillary Refill: Capillary refill takes less than 2 seconds.      Coloration: Skin is not pale.      Findings: No rash.   Neurological:      Mental Status: He is alert and oriented to person, place, and time. Mental status is at baseline.      Cranial Nerves: No cranial nerve deficit.   Psychiatric:         Mood and Affect: Mood normal.         Behavior: Behavior normal.            Assessment and Plan     1. Attention deficit disorder, unspecified hyperactivity presence  -     lisdexamfetamine (VYVANSE) 40 MG Cap; Take 1 capsule (40 mg total) by mouth once daily.  Dispense: 30 capsule; Refill: 0    2. Hypercholesteremia    3. Hypothyroidism due to acquired atrophy of thyroid  -     TSH; Future; Expected date: 04/05/2024  -     T4, Free; Future; Expected date: 04/05/2024    4. Staphylococcal arthritis of left wrist    5. Colon cancer screening  -     Ambulatory referral/consult to Endo Procedure ; Future; Expected date: 04/06/2024         ADD- increase Vyvanse to 40 mg qam      Hypothyroidism- pt has been taking his Synthroid 137 mcg daily   -repeat thyroid function today      HLD- controlled on Lipitor      Hx of Septic arthritis of left wrist- s/p arthroscopy/IV Abx      F/u in 3 months

## 2024-04-10 ENCOUNTER — OFFICE VISIT (OUTPATIENT)
Dept: URGENT CARE | Facility: CLINIC | Age: 49
End: 2024-04-10
Payer: COMMERCIAL

## 2024-04-10 VITALS
HEIGHT: 68 IN | TEMPERATURE: 98 F | OXYGEN SATURATION: 98 % | DIASTOLIC BLOOD PRESSURE: 103 MMHG | WEIGHT: 167 LBS | BODY MASS INDEX: 25.31 KG/M2 | HEART RATE: 87 BPM | SYSTOLIC BLOOD PRESSURE: 148 MMHG | RESPIRATION RATE: 18 BRPM

## 2024-04-10 DIAGNOSIS — F98.8 ATTENTION DEFICIT DISORDER, UNSPECIFIED HYPERACTIVITY PRESENCE: ICD-10-CM

## 2024-04-10 DIAGNOSIS — R03.0 ELEVATED BLOOD PRESSURE READING: ICD-10-CM

## 2024-04-10 DIAGNOSIS — R07.9 CHEST PAIN, UNSPECIFIED TYPE: ICD-10-CM

## 2024-04-10 DIAGNOSIS — M54.2 NECK PAIN: Primary | ICD-10-CM

## 2024-04-10 PROCEDURE — 96372 THER/PROPH/DIAG INJ SC/IM: CPT | Mod: S$GLB,,, | Performed by: FAMILY MEDICINE

## 2024-04-10 PROCEDURE — 99203 OFFICE O/P NEW LOW 30 MIN: CPT | Mod: 25,S$GLB,, | Performed by: FAMILY MEDICINE

## 2024-04-10 RX ORDER — KETOROLAC TROMETHAMINE 30 MG/ML
30 INJECTION, SOLUTION INTRAMUSCULAR; INTRAVENOUS
Status: COMPLETED | OUTPATIENT
Start: 2024-04-10 | End: 2024-04-10

## 2024-04-10 RX ADMIN — KETOROLAC TROMETHAMINE 30 MG: 30 INJECTION, SOLUTION INTRAMUSCULAR; INTRAVENOUS at 07:04

## 2024-04-11 LAB
OHS QRS DURATION: 86 MS
OHS QTC CALCULATION: 412 MS

## 2024-04-11 NOTE — PROGRESS NOTES
"Subjective:      Patient ID: Srinivas Snyder Jr. is a 48 y.o. male.    Vitals:  height is 5' 7.99" (1.727 m) and weight is 75.8 kg (167 lb). His tympanic temperature is 98.3 °F (36.8 °C). His blood pressure is 148/103 (abnormal) and his pulse is 87. His respiration is 18 and oxygen saturation is 98%.     Chief Complaint: Chest Pain    This is a 48 y.o. male who presents today with a chief complaint of chest pain. Patient states that he increased his Vyanse and started having chest pains. Patient states that his symptoms are radiating in his neck and chest.       Chest Pain   This is a new problem. The current episode started today. The onset quality is sudden. The problem has been unchanged. The pain is present in the lateral region. The quality of the pain is described as dull. The pain radiates to the right neck. The pain is aggravated by nothing. He has tried nothing for the symptoms.     Cardiovascular:  Positive for chest pain.   Skin:  Negative for erythema.    Objective:     Physical Exam   Constitutional: He is oriented to person, place, and time. He appears ill. He appears distressed. normal  HENT:   Head: Normocephalic and atraumatic.   Ears:   Right Ear: Tympanic membrane, external ear and ear canal normal.   Left Ear: Tympanic membrane, external ear and ear canal normal.   Nose: No rhinorrhea or congestion.   Mouth/Throat: Mucous membranes are moist. Oropharynx is clear.   Eyes: Conjunctivae are normal. Pupils are equal, round, and reactive to light. Extraocular movement intact   Neck: Neck supple.   Cardiovascular: Normal rate, regular rhythm, normal heart sounds and normal pulses.   No murmur heard.  Pulmonary/Chest: Effort normal and breath sounds normal. No respiratory distress. He has no wheezes.   Abdominal: Normal appearance and bowel sounds are normal. Soft. flat abdomen   Musculoskeletal: Normal range of motion.         General: Normal range of motion.   Neurological: no focal deficit. He " is alert, oriented to person, place, and time and at baseline. No cranial nerve deficit or sensory deficit.   Skin: Skin is warm and dry. Capillary refill takes less than 2 seconds. No erythema   Psychiatric: His behavior is normal. Mood, judgment and thought content normal.   Nursing note and vitals reviewed.    Assessment:     Plan:   1. Neck pain  - ketorolac injection 30 mg   Pt's pain resolved completely.  2. Chest pain   Normal EKG  3. Elevated blood pressure without hx of htn.  4. Adverse rxn to elevated vyvance dose.  Go to ER if Chest pain reoccurs.  F/U with prescribing MD to explore other medication  Options for ADD.

## 2024-04-16 ENCOUNTER — OFFICE VISIT (OUTPATIENT)
Dept: DERMATOLOGY | Facility: CLINIC | Age: 49
End: 2024-04-16
Payer: COMMERCIAL

## 2024-04-16 DIAGNOSIS — D22.9 BENIGN NEVUS: ICD-10-CM

## 2024-04-16 DIAGNOSIS — D23.5: ICD-10-CM

## 2024-04-16 DIAGNOSIS — D18.01 CHERRY ANGIOMA: Primary | ICD-10-CM

## 2024-04-16 PROCEDURE — 1160F RVW MEDS BY RX/DR IN RCRD: CPT | Mod: CPTII,S$GLB,, | Performed by: STUDENT IN AN ORGANIZED HEALTH CARE EDUCATION/TRAINING PROGRAM

## 2024-04-16 PROCEDURE — 1159F MED LIST DOCD IN RCRD: CPT | Mod: CPTII,S$GLB,, | Performed by: STUDENT IN AN ORGANIZED HEALTH CARE EDUCATION/TRAINING PROGRAM

## 2024-04-16 PROCEDURE — 99202 OFFICE O/P NEW SF 15 MIN: CPT | Mod: S$GLB,,, | Performed by: STUDENT IN AN ORGANIZED HEALTH CARE EDUCATION/TRAINING PROGRAM

## 2024-04-16 NOTE — PROGRESS NOTES
Subjective:      Patient ID:  Srinivas Snyder Jr. is a 48 y.o. male who presents for   Chief Complaint   Patient presents with    Cyst     Left buttock     New Patient    Patient is coming in today with complaint of cyst on his left buttocks. States they have been there for years. Wants to check to make sure they are okay and talk about removing them.     Derm Hx  Denies Phx NMSC  Fhx MM - grandmother, uncle      Review of Systems   Constitutional:  Negative for fever, chills and fatigue.   Respiratory:  Negative for cough and shortness of breath.    Gastrointestinal:  Negative for nausea, vomiting and diarrhea.   Skin:  Positive for activity-related sunscreen use and wears hat.   Hematologic/Lymphatic: Does not bruise/bleed easily.       Objective:   Physical Exam   Constitutional: He appears well-developed and well-nourished.   Neurological: He is alert and oriented to person, place, and time.   Psychiatric: He has a normal mood and affect.   Skin:   Areas Examined (abnormalities noted in diagram):   Scalp / Hair Palpated and Inspected  Genitals / Buttocks / Groin Inspection Performed                 Diagram Legend     Erythematous scaling macule/papule c/w actinic keratosis       Vascular papule c/w angioma      Pigmented verrucoid papule/plaque c/w seborrheic keratosis      Yellow umbilicated papule c/w sebaceous hyperplasia      Irregularly shaped tan macule c/w lentigo     1-2 mm smooth white papules consistent with Milia      Movable subcutaneous cyst with punctum c/w epidermal inclusion cyst      Subcutaneous movable cyst c/w pilar cyst      Firm pink to brown papule c/w dermatofibroma      Pedunculated fleshy papule(s) c/w skin tag(s)      Evenly pigmented macule c/w junctional nevus     Mildly variegated pigmented, slightly irregular-bordered macule c/w mildly atypical nevus      Flesh colored to evenly pigmented papule c/w intradermal nevus       Pink pearly papule/plaque c/w basal cell carcinoma  "     Erythematous hyperkeratotic cursted plaque c/w SCC      Surgical scar with no sign of skin cancer recurrence      Open and closed comedones      Inflammatory papules and pustules      Verrucoid papule consistent consistent with wart     Erythematous eczematous patches and plaques     Dystrophic onycholytic nail with subungual debris c/w onychomycosis     Umbilicated papule    Erythematous-base heme-crusted tan verrucoid plaque consistent with inflamed seborrheic keratosis     Erythematous Silvery Scaling Plaque c/w Psoriasis     See annotation      Assessment / Plan:        Srinivas"Dong" was seen today for cyst.    Diagnoses and all orders for this visit:    Cherry angioma  This is a benign vascular lesion. Reassurance given. No treatment required.     Benign nevus  Careful dermoscopy evaluation of nevi performed with none identified as needing biopsy today  Monitor for new mole or moles that are becoming bigger, darker, irritated, or developing irregular borders.     Benign neoplasm of skin of buttock  Left medial buttock  Suspect neurofibroma vs. Lipoma vs. Less likely cyst vs. Other benign neoplasm  Offered removal, he declines today           No follow-ups on file.  "

## 2024-04-28 ENCOUNTER — OFFICE VISIT (OUTPATIENT)
Dept: URGENT CARE | Facility: CLINIC | Age: 49
End: 2024-04-28
Payer: COMMERCIAL

## 2024-04-28 VITALS
HEIGHT: 68 IN | OXYGEN SATURATION: 96 % | BODY MASS INDEX: 25.31 KG/M2 | DIASTOLIC BLOOD PRESSURE: 89 MMHG | RESPIRATION RATE: 20 BRPM | SYSTOLIC BLOOD PRESSURE: 129 MMHG | HEART RATE: 77 BPM | WEIGHT: 167 LBS | TEMPERATURE: 98 F

## 2024-04-28 NOTE — PROGRESS NOTES
"Subjective:      Patient ID: Srinivas Snyder Jr. is a 48 y.o. male.    Vitals:  height is 5' 7.99" (1.727 m) and weight is 75.8 kg (167 lb). His temperature is 98 °F (36.7 °C). His blood pressure is 129/89 and his pulse is 77. His respiration is 20 and oxygen saturation is 96%.     Chief Complaint: Hand Pain    This is a 48 y.o. male   who presents today with a chief complaint of left fifth finger injury that happened a day ago. He was playing soccer when he jammed his finger picking up the ball. He's been using ice to help relieve his symptoms.     Hand Pain   The incident occurred 12 to 24 hours ago. The incident occurred at home. The pain is present in the left fingers. The pain does not radiate. The pain is at a severity of 0/10. The patient is experiencing no pain. Pertinent negatives include no chest pain, muscle weakness, numbness or tingling. He has tried ice for the symptoms. The treatment provided mild relief.       Cardiovascular:  Negative for chest pain.   Musculoskeletal:  Positive for trauma.   Neurological:  Negative for numbness.      Objective:     Physical Exam    Assessment:     No diagnosis found.    Plan:       There are no diagnoses linked to this encounter.                "

## 2024-05-01 ENCOUNTER — HOSPITAL ENCOUNTER (OUTPATIENT)
Dept: RADIOLOGY | Facility: HOSPITAL | Age: 49
Discharge: HOME | End: 2024-05-01
Attending: INTERNAL MEDICINE
Payer: COMMERCIAL

## 2024-05-01 ENCOUNTER — PATIENT MESSAGE (OUTPATIENT)
Dept: INTERNAL MEDICINE | Facility: CLINIC | Age: 49
End: 2024-05-01

## 2024-05-01 ENCOUNTER — OFFICE VISIT (OUTPATIENT)
Dept: INTERNAL MEDICINE | Facility: CLINIC | Age: 49
End: 2024-05-01
Payer: COMMERCIAL

## 2024-05-01 VITALS
WEIGHT: 167 LBS | SYSTOLIC BLOOD PRESSURE: 132 MMHG | HEART RATE: 101 BPM | OXYGEN SATURATION: 98 % | BODY MASS INDEX: 25.31 KG/M2 | RESPIRATION RATE: 20 BRPM | DIASTOLIC BLOOD PRESSURE: 82 MMHG | HEIGHT: 68 IN

## 2024-05-01 DIAGNOSIS — S69.92XA FINGER INJURY, LEFT, INITIAL ENCOUNTER: ICD-10-CM

## 2024-05-01 DIAGNOSIS — F98.8 ATTENTION DEFICIT DISORDER, UNSPECIFIED HYPERACTIVITY PRESENCE: ICD-10-CM

## 2024-05-01 DIAGNOSIS — S69.92XA FINGER INJURY, LEFT, INITIAL ENCOUNTER: Primary | ICD-10-CM

## 2024-05-01 PROCEDURE — 1160F RVW MEDS BY RX/DR IN RCRD: CPT | Mod: CPTII,S$GLB,, | Performed by: INTERNAL MEDICINE

## 2024-05-01 PROCEDURE — 3079F DIAST BP 80-89 MM HG: CPT | Mod: CPTII,S$GLB,, | Performed by: INTERNAL MEDICINE

## 2024-05-01 PROCEDURE — 73140 X-RAY EXAM OF FINGER(S): CPT | Mod: TC,PO,LT

## 2024-05-01 PROCEDURE — 3008F BODY MASS INDEX DOCD: CPT | Mod: CPTII,S$GLB,, | Performed by: INTERNAL MEDICINE

## 2024-05-01 PROCEDURE — 73140 X-RAY EXAM OF FINGER(S): CPT | Mod: 26,LT,, | Performed by: INTERNAL MEDICINE

## 2024-05-01 PROCEDURE — 3075F SYST BP GE 130 - 139MM HG: CPT | Mod: CPTII,S$GLB,, | Performed by: INTERNAL MEDICINE

## 2024-05-01 PROCEDURE — 99999 PR PBB SHADOW E&M-EST. PATIENT-LVL IV: CPT | Mod: PBBFAC,,, | Performed by: INTERNAL MEDICINE

## 2024-05-01 PROCEDURE — 99214 OFFICE O/P EST MOD 30 MIN: CPT | Mod: S$GLB,,, | Performed by: INTERNAL MEDICINE

## 2024-05-01 PROCEDURE — 1159F MED LIST DOCD IN RCRD: CPT | Mod: CPTII,S$GLB,, | Performed by: INTERNAL MEDICINE

## 2024-05-01 RX ORDER — LISDEXAMFETAMINE DIMESYLATE 30 MG/1
30 CAPSULE ORAL EVERY MORNING
Qty: 30 CAPSULE | Refills: 0 | Status: SHIPPED | OUTPATIENT
Start: 2024-05-01

## 2024-05-01 NOTE — PROGRESS NOTES
Subjective     Patient ID: Srinivas Snyder Jr. is a 48 y.o. male.    Chief Complaint: jammed finger    HPI  Pt jammed his left pinky finger while playing soccer over the weekend. He was going to grab the soccer ball and hit the ground with the tip of the finger. Since then he has been unable to extend the DIP joint.   Review of Systems   Constitutional:  Negative for activity change, appetite change, chills, diaphoresis, fatigue, fever and unexpected weight change.   HENT:  Negative for postnasal drip, rhinorrhea, sinus pressure/congestion, sneezing, sore throat, trouble swallowing and voice change.    Respiratory:  Negative for cough, shortness of breath and wheezing.    Cardiovascular:  Negative for chest pain, palpitations and leg swelling.   Gastrointestinal:  Negative for abdominal pain, blood in stool, constipation, diarrhea, nausea and vomiting.   Genitourinary:  Negative for dysuria.   Musculoskeletal:  Negative for arthralgias and myalgias.   Integumentary:  Negative for rash and wound.   Allergic/Immunologic: Negative for environmental allergies and food allergies.   Hematological:  Negative for adenopathy. Does not bruise/bleed easily.          Objective     Physical Exam  Constitutional:       General: He is not in acute distress.     Appearance: Normal appearance. He is well-developed. He is not diaphoretic.   HENT:      Head: Normocephalic and atraumatic.      Right Ear: External ear normal.      Left Ear: External ear normal.      Nose: Nose normal.      Mouth/Throat:      Pharynx: No oropharyngeal exudate.   Eyes:      General: No scleral icterus.        Right eye: No discharge.         Left eye: No discharge.      Conjunctiva/sclera: Conjunctivae normal.      Pupils: Pupils are equal, round, and reactive to light.   Neck:      Vascular: No JVD.   Cardiovascular:      Rate and Rhythm: Normal rate and regular rhythm.      Pulses: Normal pulses.      Heart sounds: Normal heart sounds. No murmur  heard.  Pulmonary:      Effort: Pulmonary effort is normal. No respiratory distress.      Breath sounds: Normal breath sounds. No wheezing or rales.   Abdominal:      General: Bowel sounds are normal.      Tenderness: There is no abdominal tenderness. There is no guarding or rebound.   Musculoskeletal:        Hands:       Cervical back: Normal range of motion and neck supple.      Right lower leg: No edema.      Left lower leg: No edema.   Lymphadenopathy:      Cervical: No cervical adenopathy.   Skin:     General: Skin is warm and dry.      Capillary Refill: Capillary refill takes less than 2 seconds.      Coloration: Skin is not pale.      Findings: No rash.   Neurological:      Mental Status: He is alert and oriented to person, place, and time. Mental status is at baseline.      Cranial Nerves: No cranial nerve deficit.   Psychiatric:         Mood and Affect: Mood normal.         Behavior: Behavior normal.            Assessment and Plan     1. Finger injury, left, initial encounter  -     X-Ray Finger 2 or More Views Left; Future; Expected date: 05/01/2024  -     Ambulatory referral/consult to Orthopedics; Future; Expected date: 05/08/2024  -     SPLINT FOR HOME USE    2. Attention deficit disorder, unspecified hyperactivity presence  -     lisdexamfetamine (VYVANSE) 30 MG capsule; Take 1 capsule (30 mg total) by mouth every morning.  Dispense: 30 capsule; Refill: 0      X-rays to r/o fracture  Referral to orthopedics   Rx Splint

## 2024-05-07 ENCOUNTER — PATIENT MESSAGE (OUTPATIENT)
Dept: INTERNAL MEDICINE | Facility: CLINIC | Age: 49
End: 2024-05-07
Payer: COMMERCIAL

## 2024-05-10 ENCOUNTER — PATIENT MESSAGE (OUTPATIENT)
Dept: ORTHOPEDICS | Facility: CLINIC | Age: 49
End: 2024-05-10
Payer: COMMERCIAL

## 2024-05-10 ENCOUNTER — TELEPHONE (OUTPATIENT)
Dept: ORTHOPEDICS | Facility: CLINIC | Age: 49
End: 2024-05-10
Payer: COMMERCIAL

## 2024-05-10 NOTE — TELEPHONE ENCOUNTER
CORINNEM for pt . Attempted to reach him to redirect to Dr. Dewitt or Veto Nunez for continued care. Message sent to Dr. Dewitt's SMA.   Requested a call back to the [facility] at [phone] with any questions, concerns or need for a different appointment time.

## 2024-05-20 ENCOUNTER — OFFICE VISIT (OUTPATIENT)
Dept: ORTHOPEDICS | Facility: CLINIC | Age: 49
End: 2024-05-20
Payer: COMMERCIAL

## 2024-05-20 VITALS — WEIGHT: 167.13 LBS | HEIGHT: 68 IN | BODY MASS INDEX: 25.33 KG/M2

## 2024-05-20 DIAGNOSIS — M20.012 MALLET FINGER OF LEFT HAND: Primary | ICD-10-CM

## 2024-05-20 DIAGNOSIS — S69.92XA FINGER INJURY, LEFT, INITIAL ENCOUNTER: ICD-10-CM

## 2024-05-20 PROCEDURE — 3008F BODY MASS INDEX DOCD: CPT | Mod: CPTII,S$GLB,, | Performed by: ORTHOPAEDIC SURGERY

## 2024-05-20 PROCEDURE — 99214 OFFICE O/P EST MOD 30 MIN: CPT | Mod: S$GLB,,, | Performed by: ORTHOPAEDIC SURGERY

## 2024-05-20 PROCEDURE — 1159F MED LIST DOCD IN RCRD: CPT | Mod: CPTII,S$GLB,, | Performed by: ORTHOPAEDIC SURGERY

## 2024-05-20 PROCEDURE — 99999 PR PBB SHADOW E&M-EST. PATIENT-LVL III: CPT | Mod: PBBFAC,,, | Performed by: ORTHOPAEDIC SURGERY

## 2024-05-20 NOTE — PROGRESS NOTES
[facility]  History & Physical  Orthopedics    SUBJECTIVE:      COVID-19 attestation:  This patient was treated during the COVID-19 pandemic.  This was discussed with the patient, they are aware of our current policies and procedures, were given the option of delaying their visit and or switching to a virtual visit, delaying their surgery when applicable, and they elect to proceed.    Chief Complaint: left mallet finger    Referring Provider: Amor Reeder, *     History of Present Illness:  Patient is a 48 y.o. right hand dominant male who presents today with complaints of left small finger pain after he jammed his finger while going down to  a soccer ball from the ground. He was placed in a finger splint. He states the pain has been improving since the original injury.     The patient is a/an .    Onset of symptoms/DOI was 5/1/24.    Symptoms are aggravated by activity and movement.    Symptoms are alleviated by rest and immobilization.    Symptoms consist of pain and decreased ROM.    The patient rates their pain as a 1/10.    Attempted treatment(s) and/or interventions include activity modifications, rest, anti-inflammatory medications and immobilization.     The patient denies any fevers, chills, N/V, D/C and presents for evaluation.       Past Medical History:   Diagnosis Date    Anxiety     Hyperlipidemia     Hypothyroidism      Past Surgical History:   Procedure Laterality Date    ARTHROSCOPIC DEBRIDEMENT OF WRIST Left 5/24/2023    Procedure: ARTHROSCOPY, WRIST, WITH DEBRIDEMENT;  Surgeon: Dontae Dewitt MD;  Location: [facility] OR [address] [place] [address];  Service: Orthopedics;  Laterality: Left;     Review of patient's allergies indicates:  No Known Allergies  Social History     Social History Narrative    Not on file     Family History   Problem Relation Name Age of Onset    Hyperlipidemia Mother      Hyperlipidemia Father      Diabetes Neg Hx      Hypertension Neg Hx       "Heart disease Neg Hx      Stroke Neg Hx           Current Outpatient Medications:     atorvastatin (LIPITOR) 20 MG tablet, Take 1 tablet (20 mg total) by mouth once daily., Disp: 90 tablet, Rfl: 3    ibuprofen (ADVIL,MOTRIN) 600 MG tablet, Take 1 tablet (600 mg total) by mouth every 6 (six) hours as needed for Pain. (Patient not taking: Reported on 5/1/2024), Disp: 28 tablet, Rfl: 0    indomethacin (INDOCIN) 50 MG capsule, Take 1 capsule (50 mg total) by mouth 3 (three) times daily. (Patient not taking: Reported on 5/1/2024), Disp: 90 capsule, Rfl: 0    levothyroxine (SYNTHROID) 137 MCG Tab tablet, Take 1 tablet (137 mcg total) by mouth before breakfast., Disp: 90 tablet, Rfl: 3    lisdexamfetamine (VYVANSE) 30 MG capsule, Take 1 capsule (30 mg total) by mouth every morning. (Patient not taking: Reported on 5/1/2024), Disp: 30 capsule, Rfl: 0    lisdexamfetamine (VYVANSE) 30 MG capsule, Take 1 capsule (30 mg total) by mouth every morning. (Patient not taking: Reported on 5/1/2024), Disp: 30 capsule, Rfl: 0    lisdexamfetamine (VYVANSE) 30 MG capsule, Take 1 capsule (30 mg total) by mouth every morning., Disp: 30 capsule, Rfl: 0    methocarbamoL (ROBAXIN) 750 MG Tab, Take 1 tablet (750 mg total) by mouth 3 (three) times daily as needed (shoulder/back pain). (Patient not taking: Reported on 5/1/2024), Disp: 40 tablet, Rfl: 0    traMADoL (ULTRAM) 50 mg tablet, Take 1 tablet (50 mg total) by mouth every 6 (six) hours as needed for Pain. (Patient not taking: Reported on 5/1/2024), Disp: 20 tablet, Rfl: 0      Review of Systems:  As per HPI otherwise noncontributory    OBJECTIVE:      Vital Signs (Most Recent):  Vitals:    05/20/24 1455   Weight: 75.8 kg (167 lb 1.7 oz)   Height: 5' 8" (1.727 m)     Body mass index is 25.41 kg/m².      Physical Exam:  Constitutional: The patient appears well-developed and well-nourished. No distress.   Skin: No lesions appreciated  Head: Normocephalic and atraumatic.   Nose: Nose normal. "   Ears: No deformities seen  Eyes: Conjunctivae and EOM are normal.   Neck: No tracheal deviation present.   Cardiovascular: Normal rate and intact distal pulses.    Pulmonary/Chest: Effort normal. No respiratory distress.   Abdominal: There is no guarding.   Neurological: The patient is alert.   Psychiatric: The patient has a normal mood and affect.     Left Hand/Wrist Examination:    Observation/Inspection:  Swelling  none    Deformity  none  Discoloration  none     Scars   none    Atrophy  none    HAND/WRIST EXAMINATION:  Finkelstein's Test   Neg  WHAT Test    Neg  Snuff box tenderness   Neg  Rain's Test    Neg  Hook of Hamate Tenderness  Neg  CMC grind    Neg  Circumduction test   Neg    Neurovascular Exam:  Digits WWP, brisk CR < 3s throughout  NVI motor/LTS to M/R/U nerves, radial pulse 2+  Tinel's Test - Carpal Tunnel  Neg  Tinel's Test - Cubital Tunnel  Neg  Phalen's Test    Neg  Median Nerve Compression Test Neg    ROM hand full, painless   Mild pain with full flexion, lacks about 10deg of active extension    ROM wrist full, painless    ROM elbow full, painless    Abdomen not guarded  Respirations nonlabored  Perfusion intact    Diagnostic Results:     Imaging - I independently viewed the patient's imaging as well as the radiology report.  Xrays of the patient's left hand  demonstrate no evidence of any acute fractures or dislocations or significant degenerative changes.    EMG - none    ASSESSMENT/PLAN:      48 y.o. yo male with left small finger mallet finger  Plan: The patient and I had a thorough discussion today.  We discussed the working diagnosis as well as several other potential alternative diagnoses.  Treatment options were discussed, both conservative and surgical.  Conservative treatment options would include things such as activity modifications, workplace modifications, a period of rest, oral vs topical OTC and prescription anti-inflammatory medications, occupational therapy,  splinting/bracing, immobilization, corticosteroid injections, and others.  Surgical options were discussed as well.     At this time, the patient would like to proceed with a trial of immobilization and occupational therapy to work on his ROM.     Should the patient's symptoms worsen, persist, or fail to improve they should return for reevaluation and I would be happy to see them back anytime.        Dontae Dewitt M.D.    Please be aware that this note has been generated with the assistance of [employer] voice-to-text.  Please excuse any spelling or grammatical errors.    Thank you for choosing Dr. Dontae Dewitt for your orthopedic hand and upper extremity care. It is our goal to provide you with exceptional care that will help keep you healthy, active, and get you back in the game.     If you felt that you received exemplary care today, please consider leaving feedback for Dr. Dewitt on [employer] at [email or web address].    Please do not hesitate to reach out to us via email, phone, or MyChart with any questions, concerns, or feedback.

## 2024-06-25 ENCOUNTER — OFFICE VISIT (OUTPATIENT)
Dept: ORTHOPEDICS | Facility: CLINIC | Age: 49
End: 2024-06-25
Payer: COMMERCIAL

## 2024-06-25 VITALS — HEIGHT: 68 IN | BODY MASS INDEX: 25.33 KG/M2 | WEIGHT: 167.13 LBS

## 2024-06-25 DIAGNOSIS — S69.92XA FINGER INJURY, LEFT, INITIAL ENCOUNTER: Primary | ICD-10-CM

## 2024-06-25 PROCEDURE — 99213 OFFICE O/P EST LOW 20 MIN: CPT | Mod: S$GLB,,, | Performed by: ORTHOPAEDIC SURGERY

## 2024-06-25 PROCEDURE — 3008F BODY MASS INDEX DOCD: CPT | Mod: CPTII,S$GLB,, | Performed by: ORTHOPAEDIC SURGERY

## 2024-06-25 PROCEDURE — 1159F MED LIST DOCD IN RCRD: CPT | Mod: CPTII,S$GLB,, | Performed by: ORTHOPAEDIC SURGERY

## 2024-06-25 PROCEDURE — 99999 PR PBB SHADOW E&M-EST. PATIENT-LVL III: CPT | Mod: PBBFAC,,, | Performed by: ORTHOPAEDIC SURGERY

## 2024-06-25 NOTE — PROGRESS NOTES
[facility]  History & Physical  Orthopedics    SUBJECTIVE:      COVID-19 attestation:  This patient was treated during the COVID-19 pandemic.  This was discussed with the patient, they are aware of our current policies and procedures, were given the option of delaying their visit and or switching to a virtual visit, delaying their surgery when applicable, and they elect to proceed.    Chief Complaint: left mallet finger    Referring Provider: No ref. provider found     History of Present Illness:  Patient is a 48 y.o. right hand dominant male who presents today with complaints of left small finger pain after he jammed his finger while going down to  a soccer ball from the ground. He was placed in a finger splint. He states the pain has been improving since the original injury.     Interval History 6/25/24:  Patient returns for re-evaluation. He states his pain and ROM is improving in the hand. He is wearing his splints pretty much all the time. He denies severe pain. He noticed some swelling over the dorsal aspect of the finger.     The patient is a/an .    Onset of symptoms/DOI was 5/1/24.    Symptoms are aggravated by activity and movement.    Symptoms are alleviated by rest and immobilization.    Symptoms consist of pain and decreased ROM.    The patient rates their pain as a 1/10.    Attempted treatment(s) and/or interventions include activity modifications, rest, anti-inflammatory medications and immobilization.     The patient denies any fevers, chills, N/V, D/C and presents for evaluation.       Past Medical History:   Diagnosis Date    Anxiety     Hyperlipidemia     Hypothyroidism      Past Surgical History:   Procedure Laterality Date    ARTHROSCOPIC DEBRIDEMENT OF WRIST Left 5/24/2023    Procedure: ARTHROSCOPY, WRIST, WITH DEBRIDEMENT;  Surgeon: Dontae Dewitt MD;  Location: [facility] OR [address] [place] [address];  Service: Orthopedics;  Laterality: Left;     Review of patient's  allergies indicates:  No Known Allergies  Social History     Social History Narrative    Not on file     Family History   Problem Relation Name Age of Onset    Hyperlipidemia Mother      Hyperlipidemia Father      Diabetes Neg Hx      Hypertension Neg Hx      Heart disease Neg Hx      Stroke Neg Hx           Current Outpatient Medications:     atorvastatin (LIPITOR) 20 MG tablet, Take 1 tablet (20 mg total) by mouth once daily., Disp: 90 tablet, Rfl: 3    ibuprofen (ADVIL,MOTRIN) 600 MG tablet, Take 1 tablet (600 mg total) by mouth every 6 (six) hours as needed for Pain. (Patient not taking: Reported on 5/1/2024), Disp: 28 tablet, Rfl: 0    indomethacin (INDOCIN) 50 MG capsule, Take 1 capsule (50 mg total) by mouth 3 (three) times daily. (Patient not taking: Reported on 5/1/2024), Disp: 90 capsule, Rfl: 0    levothyroxine (SYNTHROID) 137 MCG Tab tablet, Take 1 tablet (137 mcg total) by mouth before breakfast., Disp: 90 tablet, Rfl: 3    lisdexamfetamine (VYVANSE) 30 MG capsule, Take 1 capsule (30 mg total) by mouth every morning. (Patient not taking: Reported on 5/1/2024), Disp: 30 capsule, Rfl: 0    lisdexamfetamine (VYVANSE) 30 MG capsule, Take 1 capsule (30 mg total) by mouth every morning. (Patient not taking: Reported on 5/1/2024), Disp: 30 capsule, Rfl: 0    lisdexamfetamine (VYVANSE) 30 MG capsule, Take 1 capsule (30 mg total) by mouth every morning., Disp: 30 capsule, Rfl: 0    methocarbamoL (ROBAXIN) 750 MG Tab, Take 1 tablet (750 mg total) by mouth 3 (three) times daily as needed (shoulder/back pain). (Patient not taking: Reported on 5/1/2024), Disp: 40 tablet, Rfl: 0    traMADoL (ULTRAM) 50 mg tablet, Take 1 tablet (50 mg total) by mouth every 6 (six) hours as needed for Pain. (Patient not taking: Reported on 5/1/2024), Disp: 20 tablet, Rfl: 0      Review of Systems:  As per HPI otherwise noncontributory    OBJECTIVE:      Vital Signs (Most Recent):  Vitals:    06/25/24 1518   Weight: 75.8 kg (167 lb 1.7  "oz)   Height: 5' 8" (1.727 m)     Body mass index is 25.41 kg/m².      Physical Exam:  Constitutional: The patient appears well-developed and well-nourished. No distress.   Skin: No lesions appreciated  Head: Normocephalic and atraumatic.   Nose: Nose normal.   Ears: No deformities seen  Eyes: Conjunctivae and EOM are normal.   Neck: No tracheal deviation present.   Cardiovascular: Normal rate and intact distal pulses.    Pulmonary/Chest: Effort normal. No respiratory distress.   Abdominal: There is no guarding.   Neurological: The patient is alert.   Psychiatric: The patient has a normal mood and affect.     Left Hand/Wrist Examination:    Observation/Inspection:  Swelling  none    Deformity  none  Discoloration  none     Scars   none    Atrophy  none    HAND/WRIST EXAMINATION:  Finkelstein's Test   Neg  WHAT Test    Neg  Snuff box tenderness   Neg  Rain's Test    Neg  Hook of [place]ate Tenderness  Neg  CMC grind    Neg  Circumduction test   Neg    Neurovascular Exam:  Digits WWP, brisk CR < 3s throughout  NVI motor/LTS to M/R/U nerves, radial pulse 2+  Tinel's Test - Carpal Tunnel  Neg  Tinel's Test - Cubital Tunnel  Neg  Phalen's Test    Neg  Median Nerve Compression Test Neg    ROM hand full, painless   Mild pain with full flexion, lacks about 5deg of active extension  Soft tissue swelling over the dorsal fifth digit P2, likely tendinous healing    ROM wrist full, painless    ROM elbow full, painless    Abdomen not guarded  Respirations nonlabored  Perfusion intact    Diagnostic Results:     Imaging - I independently viewed the patient's imaging as well as the radiology report.  Xrays of the patient's left hand  demonstrate no evidence of any acute fractures or dislocations or significant degenerative changes.    EMG - none    ASSESSMENT/PLAN:      48 y.o. yo male with left small finger mallet finger  Plan: The patient and I had a thorough discussion today.  We discussed the working diagnosis as well as several " other potential alternative diagnoses.  Treatment options were discussed, both conservative and surgical.  Conservative treatment options would include things such as activity modifications, workplace modifications, a period of rest, oral vs topical OTC and prescription anti-inflammatory medications, occupational therapy, splinting/bracing, immobilization, corticosteroid injections, and others.  Surgical options were discussed as well.     At this time, the patient is doing well with OT and keeping the finger immobilized. Reassured him that the soft tissue swelling was tendinous healing over the small finger. Continue 2 more weeks of immobilization. RTC in 3 months.      Should the patient's symptoms worsen, persist, or fail to improve they should return for reevaluation and I would be happy to see them back anytime.        Dontae Dewitt M.D.    Please be aware that this note has been generated with the assistance of [employer] voice-to-text.  Please excuse any spelling or grammatical errors.    Thank you for choosing Dr. Dontae Dewitt for your orthopedic hand and upper extremity care. It is our goal to provide you with exceptional care that will help keep you healthy, active, and get you back in the game.     If you felt that you received exemplary care today, please consider leaving feedback for Dr. Dewitt on [employer] at [email or web address].    Please do not hesitate to reach out to us via email, phone, or MyChart with any questions, concerns, or feedback.

## 2024-06-26 ENCOUNTER — PATIENT OUTREACH (OUTPATIENT)
Dept: ADMINISTRATIVE | Facility: HOSPITAL | Age: 49
End: 2024-06-26
Payer: COMMERCIAL

## 2024-06-26 NOTE — PROGRESS NOTES
Population Health Chart Review & Patient Outreach Details      Additional [facility] Health Notes:               Updates Requested / Reviewed:      Care Everywhere, , and Immunizations Reconciliation Completed or Queried: [place]         Health Maintenance Topics Overdue:      [facility] Score: 1     Colon Cancer Screening                       Health Maintenance Topic(s) Outreach Outcomes & Actions Taken:    Primary Care Appt - Outreach Outcomes & Actions Taken  : Primary Care Appt Scheduled

## 2024-07-03 ENCOUNTER — OFFICE VISIT (OUTPATIENT)
Dept: INTERNAL MEDICINE | Facility: CLINIC | Age: 49
End: 2024-07-03
Payer: COMMERCIAL

## 2024-07-03 VITALS
RESPIRATION RATE: 12 BRPM | BODY MASS INDEX: 25.07 KG/M2 | SYSTOLIC BLOOD PRESSURE: 122 MMHG | OXYGEN SATURATION: 98 % | HEART RATE: 96 BPM | DIASTOLIC BLOOD PRESSURE: 64 MMHG | WEIGHT: 165.38 LBS | HEIGHT: 68 IN

## 2024-07-03 DIAGNOSIS — E03.4 HYPOTHYROIDISM DUE TO ACQUIRED ATROPHY OF THYROID: ICD-10-CM

## 2024-07-03 DIAGNOSIS — F98.8 ATTENTION DEFICIT DISORDER, UNSPECIFIED HYPERACTIVITY PRESENCE: Primary | ICD-10-CM

## 2024-07-03 DIAGNOSIS — E78.00 HYPERCHOLESTEREMIA: ICD-10-CM

## 2024-07-03 DIAGNOSIS — M00.032 STAPHYLOCOCCAL ARTHRITIS OF LEFT WRIST: ICD-10-CM

## 2024-07-03 PROCEDURE — 99214 OFFICE O/P EST MOD 30 MIN: CPT | Mod: S$GLB,,, | Performed by: INTERNAL MEDICINE

## 2024-07-03 PROCEDURE — 99999 PR PBB SHADOW E&M-EST. PATIENT-LVL III: CPT | Mod: PBBFAC,,, | Performed by: INTERNAL MEDICINE

## 2024-07-03 PROCEDURE — 3008F BODY MASS INDEX DOCD: CPT | Mod: CPTII,S$GLB,, | Performed by: INTERNAL MEDICINE

## 2024-07-03 PROCEDURE — 1159F MED LIST DOCD IN RCRD: CPT | Mod: CPTII,S$GLB,, | Performed by: INTERNAL MEDICINE

## 2024-07-03 PROCEDURE — 1160F RVW MEDS BY RX/DR IN RCRD: CPT | Mod: CPTII,S$GLB,, | Performed by: INTERNAL MEDICINE

## 2024-07-03 PROCEDURE — G2211 COMPLEX E/M VISIT ADD ON: HCPCS | Mod: S$GLB,,, | Performed by: INTERNAL MEDICINE

## 2024-07-03 PROCEDURE — 3078F DIAST BP <80 MM HG: CPT | Mod: CPTII,S$GLB,, | Performed by: INTERNAL MEDICINE

## 2024-07-03 PROCEDURE — 3074F SYST BP LT 130 MM HG: CPT | Mod: CPTII,S$GLB,, | Performed by: INTERNAL MEDICINE

## 2024-07-03 RX ORDER — LISDEXAMFETAMINE DIMESYLATE 30 MG/1
30 CAPSULE ORAL EVERY MORNING
Qty: 30 CAPSULE | Refills: 0 | Status: SHIPPED | OUTPATIENT
Start: 2024-08-03

## 2024-07-03 RX ORDER — LISDEXAMFETAMINE DIMESYLATE 30 MG/1
30 CAPSULE ORAL EVERY MORNING
Qty: 30 CAPSULE | Refills: 0 | Status: SHIPPED | OUTPATIENT
Start: 2024-09-03

## 2024-07-03 RX ORDER — LISDEXAMFETAMINE DIMESYLATE 30 MG/1
30 CAPSULE ORAL EVERY MORNING
Qty: 30 CAPSULE | Refills: 0 | Status: SHIPPED | OUTPATIENT
Start: 2024-07-03

## 2024-07-03 NOTE — PROGRESS NOTES
Subjective     Patient ID: Srinivas Snyder Jr. is a 48 y.o. male.    Chief Complaint: Follow-up    HPI  Pt with ADD, Hypothyroidism, HLD, Hx of left wrist septic arthritis is here for f/u. Requesting refills of his Vyvanse but would like to decrease his dose back to 30 mg qd. No med SE's.   Review of Systems   Constitutional:  Negative for activity change, appetite change, chills, diaphoresis, fatigue, fever and unexpected weight change.   HENT:  Negative for hearing loss, postnasal drip, rhinorrhea, sinus pressure/congestion, sneezing, sore throat, trouble swallowing and voice change.    Eyes:  Negative for discharge and visual disturbance.   Respiratory:  Negative for cough, chest tightness, shortness of breath and wheezing.    Cardiovascular:  Negative for chest pain, palpitations and leg swelling.   Gastrointestinal:  Negative for abdominal pain, blood in stool, constipation, diarrhea, nausea and vomiting.   Endocrine: Negative for polydipsia and polyuria.   Genitourinary:  Negative for difficulty urinating, dysuria, hematuria and urgency.   Musculoskeletal:  Negative for arthralgias, joint swelling, myalgias and neck pain.   Integumentary:  Negative for rash and wound.   Allergic/Immunologic: Negative for environmental allergies and food allergies.   Neurological:  Negative for weakness and headaches.   Hematological:  Negative for adenopathy. Does not bruise/bleed easily.   Psychiatric/Behavioral:  Positive for decreased concentration. Negative for confusion, dysphoric mood, self-injury and suicidal ideas.           Objective     Physical Exam  Constitutional:       General: He is not in acute distress.     Appearance: Normal appearance. He is well-developed. He is not diaphoretic.   HENT:      Head: Normocephalic and atraumatic.      Right Ear: External ear normal.      Left Ear: External ear normal.      Nose: Nose normal.      Mouth/Throat:      Pharynx: No oropharyngeal exudate.   Eyes:      General:  No scleral icterus.        Right eye: No discharge.         Left eye: No discharge.      Conjunctiva/sclera: Conjunctivae normal.      Pupils: Pupils are equal, round, and reactive to light.   Neck:      Vascular: No JVD.   Cardiovascular:      Rate and Rhythm: Normal rate and regular rhythm.      Pulses: Normal pulses.      Heart sounds: Normal heart sounds. No murmur heard.  Pulmonary:      Effort: Pulmonary effort is normal. No respiratory distress.      Breath sounds: Normal breath sounds. No wheezing or rales.   Abdominal:      General: Bowel sounds are normal.      Tenderness: There is no abdominal tenderness. There is no guarding or rebound.   Musculoskeletal:      Cervical back: Normal range of motion and neck supple.      Right lower leg: No edema.      Left lower leg: No edema.   Lymphadenopathy:      Cervical: No cervical adenopathy.   Skin:     General: Skin is warm and dry.      Capillary Refill: Capillary refill takes less than 2 seconds.      Coloration: Skin is not pale.      Findings: No rash.   Neurological:      Mental Status: He is alert and oriented to person, place, and time. Mental status is at baseline.      Cranial Nerves: No cranial nerve deficit.   Psychiatric:         Mood and Affect: Mood normal.         Behavior: Behavior normal.            Assessment and Plan     1. Attention deficit disorder, unspecified hyperactivity presence  -     lisdexamfetamine (VYVANSE) 30 MG capsule; Take 1 capsule (30 mg total) by mouth every morning.  Dispense: 30 capsule; Refill: 0  -     lisdexamfetamine (VYVANSE) 30 MG capsule; Take 1 capsule (30 mg total) by mouth every morning.  Dispense: 30 capsule; Refill: 0  -     lisdexamfetamine (VYVANSE) 30 MG capsule; Take 1 capsule (30 mg total) by mouth every morning.  Dispense: 30 capsule; Refill: 0    2. Hypothyroidism due to acquired atrophy of thyroid    3. Hypercholesteremia    4. Staphylococcal arthritis of left wrist         ADD- stable on Vyvanse 30 mg  qam      Hypothyroidism- controlled on Synthroid 137 mcg daily      HLD- controlled on Lipitor      Hx of Septic arthritis of left wrist- s/p arthroscopy/IV Abx      F/u in 3 months

## 2024-07-06 ENCOUNTER — ON-DEMAND VIRTUAL (OUTPATIENT)
Dept: URGENT CARE | Facility: CLINIC | Age: 49
End: 2024-07-06
Payer: COMMERCIAL

## 2024-07-06 DIAGNOSIS — R42 VERTIGO: Primary | ICD-10-CM

## 2024-07-06 RX ORDER — MECLIZINE HYDROCHLORIDE 25 MG/1
25 TABLET ORAL 3 TIMES DAILY PRN
Qty: 20 TABLET | Refills: 0 | Status: SHIPPED | OUTPATIENT
Start: 2024-07-06

## 2024-07-06 NOTE — PROGRESS NOTES
Subjective:      Patient ID: Srinivas Snyder Jr. is a 48 y.o. male.    Vitals:  vitals were not taken for this visit.     Chief Complaint: No chief complaint on file.      Visit Type: TELE AUDIOVISUAL    Present with the patient at the time of consultation: TELEMED PRESENT WITH PATIENT: None    Past Medical History:   Diagnosis Date    Anxiety     Hyperlipidemia     Hypothyroidism      Past Surgical History:   Procedure Laterality Date    ARTHROSCOPIC DEBRIDEMENT OF WRIST Left 5/24/2023    Procedure: ARTHROSCOPY, WRIST, WITH DEBRIDEMENT;  Surgeon: Dontae Dewitt MD;  Location: [address];  Service: Orthopedics;  Laterality: Left;     Review of patient's allergies indicates:  No Known Allergies  Current Outpatient Medications on File Prior to Visit   Medication Sig Dispense Refill    atorvastatin (LIPITOR) 20 MG tablet Take 1 tablet (20 mg total) by mouth once daily. 90 tablet 3    ibuprofen (ADVIL,MOTRIN) 600 MG tablet Take 1 tablet (600 mg total) by mouth every 6 (six) hours as needed for Pain. 28 tablet 0    levothyroxine (SYNTHROID) 137 MCG Tab tablet Take 1 tablet (137 mcg total) by mouth before breakfast. 90 tablet 3    [START ON 9/3/2024] lisdexamfetamine (VYVANSE) 30 MG capsule Take 1 capsule (30 mg total) by mouth every morning. 30 capsule 0    [START ON 8/3/2024] lisdexamfetamine (VYVANSE) 30 MG capsule Take 1 capsule (30 mg total) by mouth every morning. 30 capsule 0    lisdexamfetamine (VYVANSE) 30 MG capsule Take 1 capsule (30 mg total) by mouth every morning. 30 capsule 0     No current facility-administered medications on file prior to visit.     Family History   Problem Relation Name Age of Onset    Hyperlipidemia Mother      Hyperlipidemia Father      Diabetes Neg Hx      Hypertension Neg Hx      Heart disease Neg Hx      Stroke Neg Hx             [facility] Peq Odvv Intake    7/6/2024  3:29 PM CDT - Filed by Patient   What is your current physical address in the event of a  medical emergency? [address] [place]   Are you able to take your vital signs? No   Please attach any relevant images or files          49 yo male presents for several days of dizziness since Thursday.  Did recently had a viral syndrome with cold symptoms.      Neurological:  Positive for dizziness.      Objective:   The physical exam was conducted virtually.  Physical Exam   Constitutional: He is oriented to person, place, and time. He does not appear ill. No distress.   HENT:   Head: Normocephalic and atraumatic.   Pulmonary/Chest: Effort normal. No respiratory distress. He has no wheezes.   Neurological: He is alert and oriented to person, place, and time.     Assessment:     1. Vertigo        Plan:       Vertigo

## 2024-07-08 ENCOUNTER — TELEPHONE (OUTPATIENT)
Dept: INTERNAL MEDICINE | Facility: CLINIC | Age: 49
End: 2024-07-08
Payer: COMMERCIAL

## 2024-07-08 DIAGNOSIS — Z00.00 ANNUAL PHYSICAL EXAM: Primary | ICD-10-CM

## 2024-07-22 ENCOUNTER — PATIENT MESSAGE (OUTPATIENT)
Dept: ORTHOPEDICS | Facility: CLINIC | Age: 49
End: 2024-07-22
Payer: COMMERCIAL

## 2024-09-09 ENCOUNTER — ON-DEMAND VIRTUAL (OUTPATIENT)
Dept: URGENT CARE | Facility: CLINIC | Age: 49
End: 2024-09-09
Payer: COMMERCIAL

## 2024-09-09 DIAGNOSIS — J06.9 VIRAL URI WITH COUGH: Primary | ICD-10-CM

## 2024-09-09 PROCEDURE — 99499 UNLISTED E&M SERVICE: CPT | Mod: 95,,, | Performed by: NURSE PRACTITIONER

## 2024-09-09 NOTE — PROGRESS NOTES
Subjective:      Patient ID: Srinivas Snyder Jr. is a 48 y.o. male.    Vitals:  vitals were not taken for this visit.     Chief Complaint: URI      Visit Type: TELE AUDIOVISUAL    Present with the patient at the time of consultation: TELEMED PRESENT WITH PATIENT: None        Past Medical History:   Diagnosis Date    Anxiety     Hyperlipidemia     Hypothyroidism      Past Surgical History:   Procedure Laterality Date    ARTHROSCOPIC DEBRIDEMENT OF WRIST Left 5/24/2023    Procedure: ARTHROSCOPY, WRIST, WITH DEBRIDEMENT;  Surgeon: Dontae Dewitt MD;  Location: [facility] OR [address] [place] [address];  Service: Orthopedics;  Laterality: Left;     Review of patient's allergies indicates:  No Known Allergies  Current Outpatient Medications on File Prior to Visit   Medication Sig Dispense Refill    atorvastatin (LIPITOR) 20 MG tablet Take 1 tablet (20 mg total) by mouth once daily. 90 tablet 3    ibuprofen (ADVIL,MOTRIN) 600 MG tablet Take 1 tablet (600 mg total) by mouth every 6 (six) hours as needed for Pain. 28 tablet 0    levothyroxine (SYNTHROID) 137 MCG Tab tablet Take 1 tablet (137 mcg total) by mouth before breakfast. 90 tablet 3    lisdexamfetamine (VYVANSE) 30 MG capsule Take 1 capsule (30 mg total) by mouth every morning. 30 capsule 0    lisdexamfetamine (VYVANSE) 30 MG capsule Take 1 capsule (30 mg total) by mouth every morning. 30 capsule 0    lisdexamfetamine (VYVANSE) 30 MG capsule Take 1 capsule (30 mg total) by mouth every morning. 30 capsule 0    meclizine (ANTIVERT) 25 mg tablet Take 1 tablet (25 mg total) by mouth 3 (three) times daily as needed for Dizziness or Nausea. 20 tablet 0     No current facility-administered medications on file prior to visit.     Family History   Problem Relation Name Age of Onset    Hyperlipidemia Mother      Hyperlipidemia Father      Diabetes Neg Hx      Hypertension Neg Hx      Heart disease Neg Hx      Stroke Neg Hx             [facility]s Peq Odvv Intake    9/9/2024  1:46 PM CDT - Filed by  Patient   What is your current physical address in the event of a medical emergency? [address] [address]   Are you able to take your vital signs? No   Please attach any relevant images or files          47 yo with c/o runny nose, sore throat, congestion, cough for 5 days. He states symptoms started last week. He denies fever. He states felt body felt warm than normal but does not have a thermometer. He states he feels like he has flu or something. He has not done any viral testing.         Constitution: Negative for fever and generalized weakness.   HENT:  Positive for congestion and postnasal drip. Negative for sore throat.    Cardiovascular:  Negative for sob on exertion.   Respiratory:  Positive for cough and sputum production. Negative for shortness of breath and wheezing.    Allergic/Immunologic: Positive for sneezing.   Neurological:  Negative for headaches.        Objective:   The physical exam was conducted virtually.  LOCATION OF PATIENT home  Physical Exam   Constitutional: He is oriented to person, place, and time. He appears well-developed.   HENT:   Head: Normocephalic and atraumatic.   Ears:   Right Ear: Hearing, tympanic membrane and external ear normal.   Left Ear: Hearing, tympanic membrane and external ear normal.   Nose: Rhinorrhea and congestion present.   Mouth/Throat: Uvula is midline, oropharynx is clear and moist and mucous membranes are normal.   Eyes: Conjunctivae and EOM are normal. Pupils are equal, round, and reactive to light.   Neck: Neck supple.   Cardiovascular: Normal rate.   Pulmonary/Chest: Effort normal and breath sounds normal.   Musculoskeletal: Normal range of motion.         General: Normal range of motion.   Neurological: He is alert and oriented to person, place, and time.   Skin: Skin is warm.   Psychiatric: His behavior is normal. Thought content normal.   Nursing note and vitals reviewed.      Assessment:     1. Viral URI with cough        Plan:     I  suggested over the counter covid testing and symptomatic treatment. He decided to go to urgent care.     Viral URI with cough

## 2024-09-12 ENCOUNTER — ON-DEMAND VIRTUAL (OUTPATIENT)
Dept: URGENT CARE | Facility: CLINIC | Age: 49
End: 2024-09-12

## 2024-09-12 DIAGNOSIS — U07.1 COVID-19: Primary | ICD-10-CM

## 2024-09-12 PROCEDURE — 99212 OFFICE O/P EST SF 10 MIN: CPT | Mod: 95,,, | Performed by: NURSE PRACTITIONER

## 2024-09-12 RX ORDER — NIRMATRELVIR AND RITONAVIR 300-100 MG
KIT ORAL
Qty: 30 TABLET | Refills: 0 | Status: SHIPPED | OUTPATIENT
Start: 2024-09-12

## 2024-09-12 NOTE — PATIENT INSTRUCTIONS
Antiviral medication discussed as alternate treatment. Risks and benefits discussed. Side effects discussed. Medical history reviewed with patient, and medications reviewed for interactions. Interaction found with Atorvastatin. Stop taking while on the Paxlovid. May resume usual dosage 3 days after completing the Paxlovid. Patient has no other contraindications to taking this medication. Patient has agreed to taking Paxlovid and prescription was sent. You have 5 days from symptom onset to start the medication for maximum benefit. Recommend follow-up with Primary Care for continued care.      OVER THE COUNTER RECOMMENDATIONS/SUGGESTIONS.      ·         Make sure to stay well hydrated.     ·         Use Nasal Saline to mechanically move any post nasal drip from your eustachian tube or from the back of your throat.     ·         Use warm saltwater gargles to ease your throat pain. Warm saltwater gargles as needed for sore throat-  1/2 tsp salt to 1 cup warm water, gargle as desired.     ·         Use an antihistamine such as Claritin, Zyrtec or Allegra to dry you out.     ·         Use pseudoephedrine (behind the counter) to decongest. Pseudoephedrine  30 mg up to 240 mg /day. It can raise your blood pressure and give you palpitations.     ·         Use Mucinex (guaifenisin) to break up mucous up to 2400mg/day to loosen any mucous.     ·         The Mucinex DM pill has a cough suppressant that can be sedating. It can be used at night to stop the tickle at the back of your throat.     ·         You can use Mucinex D (it has guaifenesin and a high dose of pseudoephedrine) in the mornings to help decongest.     ·         Use Afrin (oxymetazoline) in each nare for no longer than 3 days, as it is addictive. It can also dry out your mucous membranes and cause elevated blood pressure. This is especially useful if you are flying.     ·         Use Flonase 1-2 sprays/nostril per day. It is a local acting steroid nasal  spray, if you develop a bloody nose, stop using the medication immediately.     ·         Sometimes Nyquil at night is beneficial to help you get some rest, however it is sedating, and it does have an antihistamine, and Tylenol.     ·         Honey is a natural cough suppressant that can be used.     ·         Tylenol up to 4,000 mg a day is safe for short periods and can be used for body aches, pain, and fever. However, in high doses and prolonged use it can cause liver irritation.     ·         Ibuprofen is a non-steroidal anti-inflammatory that can be used for body aches, pain, and fever. However, it can also cause stomach irritation if overused.     Here are some useful tips:     COVID-19 Self Care and Symptom Monitoring Program Now Available in [facility]     For community members seeking a COVID-19 test, we strongly recommend at-home testing, whether they are experiencing symptoms or want peace of mind knowing their COVID status. At-home tests can be purchased at major retailers and some local health departments are also giving away at-home testing kits to residents. The rapid at-home test kits are reliable, and many are the same tests [facility] utilizes in various healthcare settings.     If a patient recently tested positive for COVID-19, they can easily enroll in [facility]s free self-care and symptom monitoring program. By completing a quick form in [facility], they will be automatically registered into our COVID-19 Self Care and Symptom Monitoring Program. If symptoms worsen, our care team will be there to guide them on next steps and treatment options.     Click on this tip sheet for the COVID-19 Self Care and Symptom Monitoring Program.       The Urgent cares have stopped asymptomatic testing due to the volumes of symptomatic patients - we strongly recommend at-home testing.     Patient Advice     Schedule your booster shot now to protect yourself, your friends and your family.     Assess your individual  personal risk before attending any event-outdoor gatherings are best!     Wear your mask unless you are actively eating or drinking.     Wash your hands frequently, cough or sneeze into your elbow or tissue.     Following potential exposure from travel or holiday parties, test for infection 3 to 5 days later with an at home test.     International travelers should be tested regardless of symptoms, vaccination status or history of COVID-19 infection 3 to 5 days after return.     If you test positive for COVID-19 while you are traveling, view this website to learn about treatments available to you where you are     Quarantine Guidelines:        Updated guidelines will be as follows:    If you test positive for COVID-19 you may return to normal activities when, for at least 24 hours, both are true:    Your symptoms are getting better overall, and:  You have not had a fever AND are not using fever reducing medication     The CDC also recommends added precautions in the 5 days after return to normal activity including frequent hand washing, mask wearing, physical distancing.      They also recommend should the patient develop a fever or starts to feel worse after they have returned to normal activities, they should return home and away from others for at least another 24 hours. The link below is a direct link to the CDC with all this information.     https://www.cdc.gov/respiratory-viruses/prevention/precautions-when-sick.html         Sites for community testing     https://ldh.la.gov/index.cfm/page/3934?clearCache=1        https://www.[facility].org/testing     ·         As you know the urgent cares, emergency rooms and primary care offices are starting to get busier with the rapid rise of COVID and as such [facility] taking walk ups for testing:      §  Check-in via [facility] account or by calling [phone] upon arrival to be checked in remotely      §  Testing Available: PCR Testing (NOT Rapid Test)         Dexamethasone/Corticosteroids     Recommendation Against the Use of Corticosteroids to Treat Outpatients with COVID-19 and Recommendations for Vaccination of Persons with Known or Previous COVID-19 Infection           https://www.[email or web address].nih.gov/management/clinical-management/nonhospitalized-adults--therapeutic-management/      ·         COVID-19 vaccination and SARS-CoV-2 infection     https://www.cdc.gov/vaccines/covid-19/clinical-considerations/covid-19-vaccines-[place].html#CoV-19-vaccination

## 2024-09-12 NOTE — PROGRESS NOTES
Subjective:      Patient ID: Srinivas Snyder Jr. is a 48 y.o. male.    Vitals:  vitals were not taken for this visit.     Chief Complaint: COVID-19 Concerns      Visit Type: TELE AUDIOVISUAL    Present with the patient at the time of consultation: TELEMED PRESENT WITH PATIENT: None, at home    Past Medical History:   Diagnosis Date    Anxiety     Hyperlipidemia     Hypothyroidism      Past Surgical History:   Procedure Laterality Date    ARTHROSCOPIC DEBRIDEMENT OF WRIST Left 5/24/2023    Procedure: ARTHROSCOPY, WRIST, WITH DEBRIDEMENT;  Surgeon: Dontae Dewitt MD;  Location: [facility] OR [address] [place] [address];  Service: Orthopedics;  Laterality: Left;     Review of patient's allergies indicates:  No Known Allergies  Current Outpatient Medications on File Prior to Visit   Medication Sig Dispense Refill    atorvastatin (LIPITOR) 20 MG tablet Take 1 tablet (20 mg total) by mouth once daily. 90 tablet 3    ibuprofen (ADVIL,MOTRIN) 600 MG tablet Take 1 tablet (600 mg total) by mouth every 6 (six) hours as needed for Pain. 28 tablet 0    levothyroxine (SYNTHROID) 137 MCG Tab tablet Take 1 tablet (137 mcg total) by mouth before breakfast. 90 tablet 3    lisdexamfetamine (VYVANSE) 30 MG capsule Take 1 capsule (30 mg total) by mouth every morning. 30 capsule 0    lisdexamfetamine (VYVANSE) 30 MG capsule Take 1 capsule (30 mg total) by mouth every morning. 30 capsule 0    lisdexamfetamine (VYVANSE) 30 MG capsule Take 1 capsule (30 mg total) by mouth every morning. 30 capsule 0    meclizine (ANTIVERT) 25 mg tablet Take 1 tablet (25 mg total) by mouth 3 (three) times daily as needed for Dizziness or Nausea. 20 tablet 0     No current facility-administered medications on file prior to visit.     Family History   Problem Relation Name Age of Onset    Hyperlipidemia Mother      Hyperlipidemia Father      Diabetes Neg Hx      Hypertension Neg Hx      Heart disease Neg Hx      Stroke Neg Hx         Medications Ordered                [id number] [place] - [address] [facility] [address] [facility]   [address] [place] [address]    Telephone: [phone]   Fax: [phone]   Hours: Open 24 hours                         Unspecified Transmission Method (1 of 1)              nirmatrelvir-ritonavir (PAXLOVID) 300 mg (150 mg x 2)-100 mg copackaged tablets (EUA)    Sig: Take 3 tablets by mouth 2 (two) times daily. Each dose contains 2 nirmatrelvir (pink tablets) and 1 ritonavir (white tablet). Take all 3 tablets together       Start: 9/12/24     Quantity: 30 tablet Refills: 0                           [facility] Peq Odvv Intake    9/12/2024  8:38 AM CDT - Filed by Patient   What is your current physical address in the event of a medical emergency? [address] [place] [id number]   Are you able to take your vital signs? No   Please attach any relevant images or files          COVID positive. Symptom onset 2 days. Taking OTC meds with little relief.         Constitution: Positive for appetite change. Negative for chills and fever.   HENT:  Positive for congestion, postnasal drip and sore throat. Negative for ear pain.    Respiratory:  Positive for cough. Negative for shortness of breath and wheezing.    Gastrointestinal:  Negative for nausea, vomiting and diarrhea.   Musculoskeletal:  Positive for muscle ache.   Neurological:  Negative for headaches.        Objective:   The physical exam was conducted virtually.  Physical Exam   Constitutional: He is oriented to person, place, and time. He does not appear ill. No distress.   HENT:   Head: Normocephalic and atraumatic.   Nose: Nose normal.   Eyes: Extraocular movement intact   Pulmonary/Chest: Effort normal.   Abdominal: Normal appearance.   Musculoskeletal: Normal range of motion.         General: Normal range of motion.   Neurological: no focal deficit. He is alert and oriented to person, place, and time.   Psychiatric: His behavior is normal. Mood normal.    Vitals reviewed.      Assessment:     1. COVID-19        Plan:   Patient encouraged to monitor symptoms closely and instructed to follow-up for new or worsening symptoms. Further, in-person, evaluation may be necessary for continued treatment. Please follow up with your primary care doctor or specialist as needed. Verbally discussed plan. Patient confirms understanding and is in agreement with treatment and plan.     You must understand that you've received a Virtual Care evaluation only and that you may be released before all your medical problems are known or treated. You, the patient, will arrange for follow up care as instructed.      COVID-19  -     nirmatrelvir-ritonavir (PAXLOVID) 300 mg (150 mg x 2)-100 mg copackaged tablets (EUA); Take 3 tablets by mouth 2 (two) times daily. Each dose contains 2 nirmatrelvir (pink tablets) and 1 ritonavir (white tablet). Take all 3 tablets together  Dispense: 30 tablet; Refill: 0             Patient Instructions       Antiviral medication discussed as alternate treatment. Risks and benefits discussed. Side effects discussed. Medical history reviewed with patient, and medications reviewed for interactions. Interaction found with Atorvastatin. Stop taking while on the Paxlovid. May resume usual dosage 3 days after completing the Paxlovid. Patient has no other contraindications to taking this medication. Patient has agreed to taking Paxlovid and prescription was sent. You have 5 days from symptom onset to start the medication for maximum benefit. Recommend follow-up with Primary Care for continued care.      OVER THE COUNTER RECOMMENDATIONS/SUGGESTIONS.      ·         Make sure to stay well hydrated.     ·         Use Nasal Saline to mechanically move any post nasal drip from your eustachian tube or from the back of your throat.     ·         Use warm saltwater gargles to ease your throat pain. Warm saltwater gargles as needed for sore throat-  1/2 tsp salt to 1 cup warm  water, gargle as desired.     ·         Use an antihistamine such as Claritin, Zyrtec or Allegra to dry you out.     ·         Use pseudoephedrine (behind the counter) to decongest. Pseudoephedrine  30 mg up to 240 mg /day. It can raise your blood pressure and give you palpitations.     ·         Use Mucinex (guaifenisin) to break up mucous up to 2400mg/day to loosen any mucous.     ·         The Mucinex DM pill has a cough suppressant that can be sedating. It can be used at night to stop the tickle at the back of your throat.     ·         You can use Mucinex D (it has guaifenesin and a high dose of pseudoephedrine) in the mornings to help decongest.     ·         Use Afrin (oxymetazoline) in each nare for no longer than 3 days, as it is addictive. It can also dry out your mucous membranes and cause elevated blood pressure. This is especially useful if you are flying.     ·         Use Flonase 1-2 sprays/nostril per day. It is a local acting steroid nasal spray, if you develop a bloody nose, stop using the medication immediately.     ·         Sometimes Nyquil at night is beneficial to help you get some rest, however it is sedating, and it does have an antihistamine, and Tylenol.     ·         Honey is a natural cough suppressant that can be used.     ·         Tylenol up to 4,000 mg a day is safe for short periods and can be used for body aches, pain, and fever. However, in high doses and prolonged use it can cause liver irritation.     ·         Ibuprofen is a non-steroidal anti-inflammatory that can be used for body aches, pain, and fever. However, it can also cause stomach irritation if overused.     Here are some useful tips:     COVID-19 Self Care and Symptom Monitoring Program Now Available in [facility]     For community members seeking a COVID-19 test, we strongly recommend at-home testing, whether they are experiencing symptoms or want peace of mind knowing their COVID status. At-home tests can be  purchased at major retailers and some local health departments are also giving away at-home testing kits to residents. The rapid at-home test kits are reliable, and many are the same tests [facility] utilizes in various healthcare settings.     If a patient recently tested positive for COVID-19, they can easily enroll in [facility]s free self-care and symptom monitoring program. By completing a quick form in [facility], they will be automatically registered into our COVID-19 Self Care and Symptom Monitoring Program. If symptoms worsen, our care team will be there to guide them on next steps and treatment options.     Click on this tip sheet for the COVID-19 Self Care and Symptom Monitoring Program.       The Urgent cares have stopped asymptomatic testing due to the volumes of symptomatic patients - we strongly recommend at-home testing.     Patient Advice     Schedule your booster shot now to protect yourself, your friends and your family.     Assess your individual personal risk before attending any event-outdoor gatherings are best!     Wear your mask unless you are actively eating or drinking.     Wash your hands frequently, cough or sneeze into your elbow or tissue.     Following potential exposure from travel or holiday parties, test for infection 3 to 5 days later with an at home test.     International travelers should be tested regardless of symptoms, vaccination status or history of COVID-19 infection 3 to 5 days after return.     If you test positive for COVID-19 while you are traveling, view this website to learn about treatments available to you where you are     Quarantine Guidelines:        Updated guidelines will be as follows:    If you test positive for COVID-19 you may return to normal activities when, for at least 24 hours, both are true:    Your symptoms are getting better overall, and:  You have not had a fever AND are not using fever reducing medication     The CDC also recommends added precautions  in the 5 days after return to normal activity including frequent hand washing, mask wearing, physical distancing.      They also recommend should the patient develop a fever or starts to feel worse after they have returned to normal activities, they should return home and away from others for at least another 24 hours. The link below is a direct link to the CDC with all this information.     https://www.cdc.gov/respiratory-viruses/prevention/precautions-when-sick.html         Sites for community testing     https://ldh.la.gov/index.cfm/page/3934?clearCache=1        https://www.[employer].org/testing     ·         As you know the urgent cares, emergency rooms and primary care offices are starting to get busier with the rapid rise of COVID and as such [facility] is taking walk ups for testing:      §  Check-in via [facility] account or by calling [phone] upon arrival to be checked in remotely      §  Testing Available: PCR Testing (NOT Rapid Test)        Dexamethasone/Corticosteroids     Recommendation Against the Use of Corticosteroids to Treat Outpatients with COVID-19 and Recommendations for Vaccination of Persons with Known or Previous COVID-19 Infection           https://[email or web address].nih.gov/management/clinical-management/nonhospitalized-adults--therapeutic-management/      ·         COVID-19 vaccination and SARS-CoV-2 infection     https://www.cdc.gov/vaccines/covid-19/clinical-considerations/covid-19-vaccines-[place].html#CoV-19-vaccination

## 2024-10-01 ENCOUNTER — PATIENT MESSAGE (OUTPATIENT)
Dept: INTERNAL MEDICINE | Facility: CLINIC | Age: 49
End: 2024-10-01
Payer: COMMERCIAL

## 2024-10-11 ENCOUNTER — OFFICE VISIT (OUTPATIENT)
Dept: INTERNAL MEDICINE | Facility: CLINIC | Age: 49
End: 2024-10-11
Payer: COMMERCIAL

## 2024-10-11 VITALS
BODY MASS INDEX: 24.92 KG/M2 | WEIGHT: 164.44 LBS | HEART RATE: 74 BPM | TEMPERATURE: 98 F | HEIGHT: 68 IN | RESPIRATION RATE: 12 BRPM | OXYGEN SATURATION: 98 % | SYSTOLIC BLOOD PRESSURE: 138 MMHG | DIASTOLIC BLOOD PRESSURE: 62 MMHG

## 2024-10-11 DIAGNOSIS — R09.81 NASAL CONGESTION: ICD-10-CM

## 2024-10-11 DIAGNOSIS — F98.8 ATTENTION DEFICIT DISORDER, UNSPECIFIED TYPE: ICD-10-CM

## 2024-10-11 DIAGNOSIS — F90.9 ATTENTION DEFICIT HYPERACTIVITY DISORDER (ADHD), UNSPECIFIED ADHD TYPE: Primary | ICD-10-CM

## 2024-10-11 PROCEDURE — 99999 PR PBB SHADOW E&M-EST. PATIENT-LVL IV: CPT | Mod: PBBFAC,,, | Performed by: NURSE PRACTITIONER

## 2024-10-11 RX ORDER — LISDEXAMFETAMINE DIMESYLATE 30 MG/1
30 CAPSULE ORAL EVERY MORNING
Qty: 30 CAPSULE | Refills: 0 | Status: SHIPPED | OUTPATIENT
Start: 2024-10-11

## 2024-10-11 RX ORDER — CETIRIZINE HYDROCHLORIDE 10 MG/1
10 TABLET ORAL DAILY
Qty: 30 TABLET | Refills: 2 | Status: SHIPPED | OUTPATIENT
Start: 2024-10-11

## 2024-10-11 RX ORDER — FLUTICASONE PROPIONATE 50 MCG
1 SPRAY, SUSPENSION (ML) NASAL DAILY
Qty: 16 G | Refills: 2 | Status: SHIPPED | OUTPATIENT
Start: 2024-10-11

## 2024-10-11 NOTE — PROGRESS NOTES
Subjective:       Patient ID: Srinivas Snyder Jr. is a 48 y.o. male.    Chief Complaint: Follow-up, Medication Refill, and URI    Follow-up  Associated symptoms include coughing. Pertinent negatives include no chest pain, chills, fever, headaches, myalgias, rash or sore throat.   Medication Refill  Associated symptoms include coughing. Pertinent negatives include no chest pain, chills, fever, headaches, myalgias, rash or sore throat.   URI   Associated symptoms include coughing and rhinorrhea. Pertinent negatives include no chest pain, ear pain, headaches, rash, sore throat or wheezing.   Cough  This is a new problem. The current episode started in the past 7 days. The problem has been gradually worsening. The problem occurs constantly. The cough is Non-productive. Associated symptoms include nasal congestion and rhinorrhea. Pertinent negatives include no chest pain, chills, ear congestion, ear pain, fever, headaches, heartburn, hemoptysis, myalgias, postnasal drip, rash, sore throat, shortness of breath, sweats, weight loss or wheezing. Nothing aggravates the symptoms. He has tried OTC cough suppressant for the symptoms. The treatment provided no relief. There is no history of asthma, bronchiectasis, bronchitis, COPD, emphysema, environmental allergies or pneumonia.       History of Present Illness    CHIEF COMPLAINT:  Patient presents today for medication refills and cold symptoms.    ADHD AND MEDICATION MANAGEMENT:  He requests a refill for Vyvanse 30 mg daily, which he has been taking for a few years. He reports positive effects, including improved focus and appetite suppression. He previously tried 40 mg but experienced an uncomfortable increased heart rate. He feels the current 30 mg dose is effective and well-tolerated. He continues to experience ADHD symptoms, including hyper-focus on tasks and difficulty transitioning between activities, particularly struggling to disengage from work-related thoughts  at the end of the day.    CURRENT ILLNESS:  He reports a runny nose and lingering congestion for approximately 1-2 weeks. His cough has mostly resolved. He had COVID-19 4 weeks ago, which has since cleared. He denies fever, body aches, chills, or productive cough. He has been using Sudafed D or Claritin Cold D for congestion relief, noting some benefit but quick wear-off of effects.    Review of Systems   Constitutional:  Negative for chills, fever and weight loss.   HENT:  Positive for rhinorrhea. Negative for ear pain, postnasal drip and sore throat.    Respiratory:  Positive for cough. Negative for hemoptysis, shortness of breath and wheezing.    Cardiovascular:  Negative for chest pain.   Gastrointestinal:  Negative for heartburn.   Musculoskeletal:  Negative for myalgias.   Integumentary:  Negative for rash.   Allergic/Immunologic: Negative for environmental allergies.   Neurological:  Negative for headaches.   All other systems reviewed and are negative.            Objective:      Physical Exam  Vitals reviewed.   Constitutional:       Appearance: Normal appearance.   HENT:      Head: Normocephalic and atraumatic.      Right Ear: Tympanic membrane normal.      Left Ear: Tympanic membrane normal.      Nose: Rhinorrhea present.      Mouth/Throat:      Mouth: Mucous membranes are moist.   Eyes:      Pupils: Pupils are equal, round, and reactive to light.   Cardiovascular:      Rate and Rhythm: Normal rate and regular rhythm.      Heart sounds: Normal heart sounds.   Pulmonary:      Effort: Pulmonary effort is normal.      Breath sounds: Normal breath sounds.   Abdominal:      General: Abdomen is flat. Bowel sounds are normal.      Palpations: Abdomen is soft.   Musculoskeletal:         General: Normal range of motion.      Cervical back: Normal range of motion.   Skin:     General: Skin is warm and dry.   Neurological:      General: No focal deficit present.      Mental Status: He is alert and oriented to person,  place, and time.   Psychiatric:         Mood and Affect: Mood normal.         Behavior: Behavior normal.         Assessment:       1. Attention deficit disorder, unspecified type    2. Nasal congestion  - fluticasone propionate (FLONASE) 50 mcg/actuation nasal spray; 1 spray (50 mcg total) by Each Nostril route once daily.  Dispense: 16 g; Refill: 2  - cetirizine (ZYRTEC) 10 MG tablet; Take 1 tablet (10 mg total) by mouth once daily.  Dispense: 30 tablet; Refill: 2    3. Attention deficit hyperactivity disorder (ADHD), unspecified ADHD type  - lisdexamfetamine (VYVANSE) 30 MG capsule; Take 1 capsule (30 mg total) by mouth every morning.  Dispense: 30 capsule; Refill: 0  - lisdexamfetamine (VYVANSE) 30 MG capsule; Take 1 capsule (30 mg total) by mouth every morning.  Dispense: 30 capsule; Refill: 0  - lisdexamfetamine (VYVANSE) 30 MG capsule; Take 1 capsule (30 mg total) by mouth every morning.  Dispense: 30 capsule; Refill: 0      Plan:         Assessment & Plan    IMPRESSION:  Assessed patient's current Vyvanse dosage and determined 30mg to be appropriate, as 40mg caused increased heart rate  Evaluated patient's lingering cold symptoms, including runny nose and congestion  Considered steroid nasal spray and antihistamine for symptom relief    ALLERGIC RHINITIS:  - Explained the mechanism of congestion: mucus production and sinus swelling leading to drainage issues.  - Discussed the role of steroids in reducing swelling and improving sinus drainage.  - Started Flonase (fluticasone) nasal spray.  - Started Zyrtec (cetirizine) antihistamine.    MEDICATIONS/SUPPLEMENTS:  - Continued Vyvanse 30mg daily.     RTC in 3 months for ongoing ADHD management.     This note was generated with the assistance of ambient listening technology. Verbal consent was obtained by the patient and accompanying visitor(s) for the recording of patient appointment to facilitate this note. I attest to having reviewed and edited the generated  note for accuracy, though some syntax or spelling errors may persist. Please contact the author of this note for any clarification.       Answers submitted by the patient for this visit:  Cough Questionnaire (Submitted on 10/11/2024)  Chief Complaint: Cough  Chronicity: new  Onset: in the past 7 days  Progression since onset: gradually worsening  Frequency: constantly  Cough characteristics: non-productive  chest pain: No  chills: No  ear congestion: No  ear pain: No  fever: No  headaches: No  heartburn: No  hemoptysis: No  myalgias: No  nasal congestion: Yes  postnasal drip: No  rash: No  rhinorrhea: Yes  shortness of breath: No  sore throat: No  sweats: No  weight loss: No  wheezing: No  Aggravated by: nothing  asthma: No  bronchiectasis: No  bronchitis: No  COPD: No  emphysema: No  environmental allergies: No  pneumonia: No  Treatments tried: OTC cough suppressant  Improvement on treatment: no relief

## 2024-10-15 ENCOUNTER — PATIENT MESSAGE (OUTPATIENT)
Dept: INTERNAL MEDICINE | Facility: CLINIC | Age: 49
End: 2024-10-15
Payer: COMMERCIAL

## 2024-10-15 DIAGNOSIS — F98.8 ATTENTION DEFICIT DISORDER, UNSPECIFIED HYPERACTIVITY PRESENCE: ICD-10-CM

## 2024-10-15 DIAGNOSIS — R09.81 NASAL CONGESTION: ICD-10-CM

## 2024-10-15 RX ORDER — CETIRIZINE HYDROCHLORIDE 10 MG/1
10 TABLET ORAL DAILY
Qty: 30 TABLET | Refills: 2 | Status: SHIPPED | OUTPATIENT
Start: 2024-10-15

## 2024-10-15 RX ORDER — LISDEXAMFETAMINE DIMESYLATE 30 MG/1
30 CAPSULE ORAL EVERY MORNING
Qty: 30 CAPSULE | Refills: 0 | Status: SHIPPED | OUTPATIENT
Start: 2024-10-15

## 2024-10-15 NOTE — TELEPHONE ENCOUNTER
Care Due:                  Date            Visit Type   Department     Provider  --------------------------------------------------------------------------------                                MYCHART                              FOLLOWUP/OF  [facility] INTERNAL  Last Visit: 07-      FICE VISIT   MEDICINE       Amor Reeder  Next Visit: None Scheduled  None         None Found                                                            Last  Test          Frequency    Reason                     Performed    Due Date  --------------------------------------------------------------------------------    CMP.........  12 months..  atorvastatin.............  09- 09-    Lipid Panel.  12 months..  atorvastatin.............  09- 09-    TSH.........  12 months..  levothyroxine............  09- 09-    [facility] Embedded Care Due Messages. Reference number: [id number].   10/15/2024 5:45:15 AM CDT

## 2024-10-15 NOTE — TELEPHONE ENCOUNTER
Refill Decision Note   Srinivas Snyder  is requesting a refill authorization.  Brief Assessment and Rationale for Refill:  Approve     Medication Therapy Plan:  Previous approval sent 10/11/24 to [id number].  Pt requesting change in pharmacy.  Will re-issue rx to patients requested pharmacy ([facility] #[id number])      Comments:     Note composed:10:42 AM 10/15/2024             Appointments     Last Visit   7/3/2024 Amor Reeder, DO   Next Visit   10/15/2024 Amor Reeder, DO

## 2024-10-19 DIAGNOSIS — E78.00 HYPERCHOLESTEREMIA: ICD-10-CM

## 2024-10-19 DIAGNOSIS — E03.4 HYPOTHYROIDISM DUE TO ACQUIRED ATROPHY OF THYROID: ICD-10-CM

## 2024-10-19 NOTE — TELEPHONE ENCOUNTER
No care due was identified.  [facility] Embedded Care Due Messages. Reference number: [id number].   10/19/2024 3:35:20 PM CDT

## 2024-10-21 RX ORDER — LEVOTHYROXINE SODIUM 137 UG/1
137 TABLET ORAL
Qty: 90 TABLET | Refills: 3 | Status: SHIPPED | OUTPATIENT
Start: 2024-10-21

## 2024-10-21 RX ORDER — ATORVASTATIN CALCIUM 20 MG/1
20 TABLET, FILM COATED ORAL DAILY
Qty: 90 TABLET | Refills: 3 | Status: SHIPPED | OUTPATIENT
Start: 2024-10-21

## 2024-10-21 NOTE — TELEPHONE ENCOUNTER
Refill Routing Note   Medication(s) are not appropriate for processing by [facility] for the following reason(s):        Required labs outdated    [facility] action(s):  Defer               Appointments  past 12m or future 3m with PCP    Date Provider   Last Visit   7/3/2024 Amor Reeder, DO   Next Visit   Visit date not found Amor Reeder, DO   ED visits in past 90 days: 0        Note composed:10:39 AM 10/21/2024

## 2024-10-23 ENCOUNTER — OFFICE VISIT (OUTPATIENT)
Dept: INTERNAL MEDICINE | Facility: CLINIC | Age: 49
End: 2024-10-23
Payer: COMMERCIAL

## 2024-10-23 VITALS
BODY MASS INDEX: 24.92 KG/M2 | HEART RATE: 76 BPM | RESPIRATION RATE: 16 BRPM | DIASTOLIC BLOOD PRESSURE: 78 MMHG | SYSTOLIC BLOOD PRESSURE: 136 MMHG | WEIGHT: 164.44 LBS | OXYGEN SATURATION: 97 % | HEIGHT: 68 IN | TEMPERATURE: 98 F

## 2024-10-23 DIAGNOSIS — J32.9 SINUSITIS, UNSPECIFIED CHRONICITY, UNSPECIFIED LOCATION: ICD-10-CM

## 2024-10-23 DIAGNOSIS — F90.9 ATTENTION DEFICIT HYPERACTIVITY DISORDER (ADHD), UNSPECIFIED ADHD TYPE: Primary | ICD-10-CM

## 2024-10-23 PROCEDURE — 3075F SYST BP GE 130 - 139MM HG: CPT | Mod: CPTII,S$GLB,, | Performed by: NURSE PRACTITIONER

## 2024-10-23 PROCEDURE — 1159F MED LIST DOCD IN RCRD: CPT | Mod: CPTII,S$GLB,, | Performed by: NURSE PRACTITIONER

## 2024-10-23 PROCEDURE — 99999 PR PBB SHADOW E&M-EST. PATIENT-LVL V: CPT | Mod: PBBFAC,,, | Performed by: NURSE PRACTITIONER

## 2024-10-23 PROCEDURE — 3078F DIAST BP <80 MM HG: CPT | Mod: CPTII,S$GLB,, | Performed by: NURSE PRACTITIONER

## 2024-10-23 PROCEDURE — 3008F BODY MASS INDEX DOCD: CPT | Mod: CPTII,S$GLB,, | Performed by: NURSE PRACTITIONER

## 2024-10-23 PROCEDURE — 1160F RVW MEDS BY RX/DR IN RCRD: CPT | Mod: CPTII,S$GLB,, | Performed by: NURSE PRACTITIONER

## 2024-10-23 PROCEDURE — 99214 OFFICE O/P EST MOD 30 MIN: CPT | Mod: 25,S$GLB,, | Performed by: NURSE PRACTITIONER

## 2024-10-23 PROCEDURE — 96372 THER/PROPH/DIAG INJ SC/IM: CPT | Mod: S$GLB,,, | Performed by: NURSE PRACTITIONER

## 2024-10-23 RX ORDER — LISDEXAMFETAMINE DIMESYLATE 30 MG/1
30 CAPSULE ORAL EVERY MORNING
Qty: 30 CAPSULE | Refills: 0 | Status: SHIPPED | OUTPATIENT
Start: 2024-10-23

## 2024-10-23 RX ORDER — TRIAMCINOLONE ACETONIDE 40 MG/ML
40 INJECTION, SUSPENSION INTRA-ARTICULAR; INTRAMUSCULAR
Status: COMPLETED | OUTPATIENT
Start: 2024-10-23 | End: 2024-10-23

## 2024-10-23 RX ORDER — AZITHROMYCIN 250 MG/1
TABLET, FILM COATED ORAL
Qty: 6 TABLET | Refills: 0 | Status: SHIPPED | OUTPATIENT
Start: 2024-10-23 | End: 2024-10-28

## 2024-10-23 RX ADMIN — TRIAMCINOLONE ACETONIDE 40 MG: 40 INJECTION, SUSPENSION INTRA-ARTICULAR; INTRAMUSCULAR at 09:10

## 2024-10-23 NOTE — PROGRESS NOTES
Subjective     Patient ID: Srinivas Snyder Jr. is a 48 y.o. male.    Chief Complaint: Follow-up and Medication Refill    Patient in office for ongoing management of ADHD.  Patient has been consistent with Vyvanse 30 mg q.day.  Previous script was sent to pharmacy where there was some difficulty obtaining due to shortage of medication.  Patient would like medication sent to the [facility] pharmacy.      Patient continues to complain of sinus congestion which has minimally improve with Flonase and Zyrtec but he continues to feel significant congestion with rhinorrhea.  He denies any fever, body aches or chills.  He has also gotten some relief with OTC Sudafed.  He does report some maxillary sinus pressure.  He denies any other acute care concerns during today's visit.    Follow-up  Associated symptoms include congestion. Pertinent negatives include no arthralgias, chest pain, headaches, joint swelling, neck pain, vomiting or weakness.   Medication Refill  Associated symptoms include congestion. Pertinent negatives include no arthralgias, chest pain, headaches, joint swelling, neck pain, vomiting or weakness.     Review of Systems   Constitutional:  Negative for activity change and unexpected weight change.   HENT:  Positive for nasal congestion, postnasal drip and sinus pressure/congestion. Negative for hearing loss, rhinorrhea and trouble swallowing.    Eyes:  Negative for discharge and visual disturbance.   Respiratory:  Negative for chest tightness and wheezing.    Cardiovascular:  Negative for chest pain and palpitations.   Gastrointestinal:  Negative for blood in stool, constipation, diarrhea and vomiting.   Endocrine: Negative for polydipsia and polyuria.   Genitourinary:  Negative for difficulty urinating, hematuria and urgency.   Musculoskeletal:  Negative for arthralgias, joint swelling and neck pain.   Neurological:  Negative for weakness and headaches.   Psychiatric/Behavioral:  Negative for  confusion and dysphoric mood.    All other systems reviewed and are negative.     Objective   Physical Exam  Vitals reviewed.   Constitutional:       Appearance: Normal appearance.   HENT:      Head: Normocephalic and atraumatic.      Right Ear: Tympanic membrane normal.      Left Ear: Tympanic membrane normal.      Nose: Congestion and rhinorrhea present.      Right Sinus: Maxillary sinus tenderness present.      Left Sinus: Maxillary sinus tenderness present.      Mouth/Throat:      Mouth: Mucous membranes are moist.   Eyes:      Pupils: Pupils are equal, round, and reactive to light.   Cardiovascular:      Rate and Rhythm: Normal rate and regular rhythm.      Heart sounds: Normal heart sounds.   Pulmonary:      Effort: Pulmonary effort is normal.      Breath sounds: Normal breath sounds.   Abdominal:      General: Abdomen is flat. Bowel sounds are normal.      Palpations: Abdomen is soft.   Musculoskeletal:         General: Normal range of motion.      Cervical back: Normal range of motion.   Skin:     General: Skin is warm and dry.   Neurological:      General: No focal deficit present.      Mental Status: He is alert and oriented to person, place, and time.   Psychiatric:         Mood and Affect: Mood normal.         Behavior: Behavior normal.        Assessment and Plan   1. Attention deficit hyperactivity disorder (ADHD), unspecified ADHD type  Assessment & Plan:  -3 months of Vyvanse sent to pharmacy at [facility]  -patient recommended to follow up in 3 months for additional refills.    Orders:  -     lisdexamfetamine (VYVANSE) 30 MG capsule; Take 1 capsule (30 mg total) by mouth every morning.  Dispense: 30 capsule; Refill: 0  -     lisdexamfetamine (VYVANSE) 30 MG capsule; Take 1 capsule (30 mg total) by mouth every morning.  Dispense: 30 capsule; Refill: 0  -     lisdexamfetamine (VYVANSE) 30 MG capsule; Take 1 capsule (30 mg total) by mouth every morning.  Dispense: 30 capsule; Refill: 0  -      azithromycin (Z-SARAH) 250 MG tablet; Take 2 tablets by mouth on day 1; Take 1 tablet by mouth on days 2-5  Dispense: 6 tablet; Refill: 0    2. Sinusitis, unspecified chronicity, unspecified location  Assessment & Plan:  -patient recommended to continue with Flonase but he can increase up to twice a day and Zyrtec   -we will administer a triamcinolone 40 mg IM   -and we will prescribe a Z-Sarah based on the duration of symptoms which have not improved    Orders:  -     azithromycin (Z-SARAH) 250 MG tablet; Take 2 tablets by mouth on day 1; Take 1 tablet by mouth on days 2-5  Dispense: 6 tablet; Refill: 0    Other orders  -     triamcinolone acetonide injection 40 mg    Follow up in 3 months for additional refills

## 2024-10-23 NOTE — ASSESSMENT & PLAN NOTE
-patient recommended to continue with Flonase but he can increase up to twice a day and Zyrtec   -we will administer a triamcinolone 40 mg IM   -and we will prescribe a Z-Mark based on the duration of symptoms which have not improved

## 2024-10-23 NOTE — ASSESSMENT & PLAN NOTE
-3 months of Vyvanse sent to pharmacy at [facility]  -patient recommended to follow up in 3 months for additional refills.

## 2024-10-23 NOTE — PROGRESS NOTES
Answers submitted by the patient for this visit:  Review of Systems Questionnaire (Submitted on 10/23/2024)  activity change: No  unexpected weight change: No  neck pain: No  hearing loss: No  rhinorrhea: No  trouble swallowing: No  eye discharge: No  visual disturbance: No  chest tightness: No  wheezing: No  chest pain: No  palpitations: No  blood in stool: No  constipation: No  vomiting: No  diarrhea: No  polydipsia: No  polyuria: No  difficulty urinating: No  urgency: No  hematuria: No  joint swelling: No  arthralgias: No  headaches: No  weakness: No  confusion: No  dysphoric mood: No

## 2025-01-20 DIAGNOSIS — F90.9 ATTENTION DEFICIT HYPERACTIVITY DISORDER (ADHD), UNSPECIFIED ADHD TYPE: ICD-10-CM

## 2025-01-20 RX ORDER — LISDEXAMFETAMINE DIMESYLATE 30 MG/1
30 CAPSULE ORAL EVERY MORNING
Qty: 30 CAPSULE | Refills: 0 | Status: CANCELLED | OUTPATIENT
Start: 2025-01-20

## 2025-01-22 ENCOUNTER — OFFICE VISIT (OUTPATIENT)
Dept: INTERNAL MEDICINE | Facility: CLINIC | Age: 50
End: 2025-01-22
Payer: COMMERCIAL

## 2025-01-22 DIAGNOSIS — F98.8 ATTENTION DEFICIT DISORDER, UNSPECIFIED TYPE: Primary | ICD-10-CM

## 2025-01-22 PROCEDURE — 98005 SYNCH AUDIO-VIDEO EST LOW 20: CPT | Mod: 95,,, | Performed by: NURSE PRACTITIONER

## 2025-01-22 PROCEDURE — 1159F MED LIST DOCD IN RCRD: CPT | Mod: CPTII,95,, | Performed by: NURSE PRACTITIONER

## 2025-01-22 PROCEDURE — 1160F RVW MEDS BY RX/DR IN RCRD: CPT | Mod: CPTII,95,, | Performed by: NURSE PRACTITIONER

## 2025-01-22 RX ORDER — LISDEXAMFETAMINE DIMESYLATE 30 MG/1
30 CAPSULE ORAL DAILY
Qty: 30 CAPSULE | Refills: 0 | Status: SHIPPED | OUTPATIENT
Start: 2025-02-22 | End: 2025-03-24

## 2025-01-22 RX ORDER — LISDEXAMFETAMINE DIMESYLATE 30 MG/1
30 CAPSULE ORAL DAILY
Qty: 30 CAPSULE | Refills: 0 | Status: SHIPPED | OUTPATIENT
Start: 2025-03-25 | End: 2025-04-24

## 2025-01-22 RX ORDER — LISDEXAMFETAMINE DIMESYLATE 30 MG/1
30 CAPSULE ORAL EVERY MORNING
Qty: 30 CAPSULE | Refills: 0 | OUTPATIENT
Start: 2025-01-22

## 2025-01-22 RX ORDER — LISDEXAMFETAMINE DIMESYLATE 30 MG/1
30 CAPSULE ORAL DAILY
Qty: 30 CAPSULE | Refills: 0 | Status: SHIPPED | OUTPATIENT
Start: 2025-01-22 | End: 2025-02-23

## 2025-01-22 NOTE — ASSESSMENT & PLAN NOTE
-Chronic, stable on current daily dose of Vyvanse 30 mg daily  -3 month refills provided.  -F/u in 3 months for additional refills  -Pharmacy switched to [facility] per request.

## 2025-01-22 NOTE — PROGRESS NOTES
The patient location is: [place]  The chief complaint leading to consultation is: ADHD management    Visit type: audiovisual    Notes:    Subjective:    HPI: 49 y.o. male contacted virtually for ongoing ADHD management.  Patient has been consistent with Vyvanse 30 mg q.day without any reportable side effects.  Patient reports mild decreased appetite but denies any weight loss.  Patient denies any acute care concerns during visit.     Review of Systems   Negative    Physical Exam  Pt not in any acute distress    Plan:  1. Attention deficit disorder, unspecified type  Assessment & Plan:  -Chronic, stable on current daily dose of Vyvanse 30 mg daily  -3 month refills provided.  -F/u in 3 months for additional refills  -Pharmacy switched to [facility] per request.     Orders:  -     lisdexamfetamine (VYVANSE) 30 MG capsule; Take 1 capsule (30 mg total) by mouth once daily.  Dispense: 30 capsule; Refill: 0  -     lisdexamfetamine (VYVANSE) 30 MG capsule; Take 1 capsule (30 mg total) by mouth once daily.  Dispense: 30 capsule; Refill: 0  -     lisdexamfetamine (VYVANSE) 30 MG capsule; Take 1 capsule (30 mg total) by mouth once daily.  Dispense: 30 capsule; Refill: 0    Face to Face time with patient: 7 minutes of total time spent on the encounter, which includes face to face time and non-face to face time preparing to see the patient (eg, review of tests), Obtaining and/or reviewing separately obtained history, Documenting clinical information in the electronic or other health record, Independently interpreting results (not separately reported) and communicating results to the patient/family/caregiver, or Care coordination (not separately reported).     Each patient to whom he or she provides medical services by telemedicine is:  (1) informed of the relationship between the physician and patient and the respective role of any other health care provider with respect to management of the patient; and (2) notified that he or she  may decline to receive medical services by telemedicine and may withdraw from such care at any time.

## 2025-05-06 ENCOUNTER — PATIENT MESSAGE (OUTPATIENT)
Dept: ADMINISTRATIVE | Facility: HOSPITAL | Age: 50
End: 2025-05-06
Payer: COMMERCIAL

## 2025-05-07 ENCOUNTER — OFFICE VISIT (OUTPATIENT)
Dept: INTERNAL MEDICINE | Facility: CLINIC | Age: 50
End: 2025-05-07
Payer: COMMERCIAL

## 2025-05-07 VITALS
RESPIRATION RATE: 16 BRPM | SYSTOLIC BLOOD PRESSURE: 142 MMHG | HEIGHT: 68 IN | OXYGEN SATURATION: 98 % | WEIGHT: 165.69 LBS | HEART RATE: 90 BPM | DIASTOLIC BLOOD PRESSURE: 90 MMHG | BODY MASS INDEX: 25.11 KG/M2 | TEMPERATURE: 97 F

## 2025-05-07 DIAGNOSIS — J32.9 SINUSITIS, UNSPECIFIED CHRONICITY, UNSPECIFIED LOCATION: ICD-10-CM

## 2025-05-07 DIAGNOSIS — Z12.11 SCREENING FOR COLON CANCER: ICD-10-CM

## 2025-05-07 DIAGNOSIS — R09.81 NASAL CONGESTION: ICD-10-CM

## 2025-05-07 DIAGNOSIS — R09.81 SINUS CONGESTION: ICD-10-CM

## 2025-05-07 DIAGNOSIS — F41.9 ANXIETY: ICD-10-CM

## 2025-05-07 DIAGNOSIS — F98.8 ATTENTION DEFICIT DISORDER, UNSPECIFIED TYPE: Primary | ICD-10-CM

## 2025-05-07 DIAGNOSIS — R05.2 SUBACUTE COUGH: ICD-10-CM

## 2025-05-07 PROCEDURE — 1160F RVW MEDS BY RX/DR IN RCRD: CPT | Mod: CPTII,S$GLB,, | Performed by: NURSE PRACTITIONER

## 2025-05-07 PROCEDURE — 1159F MED LIST DOCD IN RCRD: CPT | Mod: CPTII,S$GLB,, | Performed by: NURSE PRACTITIONER

## 2025-05-07 PROCEDURE — 99999 PR PBB SHADOW E&M-EST. PATIENT-LVL V: CPT | Mod: PBBFAC,,, | Performed by: NURSE PRACTITIONER

## 2025-05-07 PROCEDURE — 99214 OFFICE O/P EST MOD 30 MIN: CPT | Mod: S$GLB,,, | Performed by: NURSE PRACTITIONER

## 2025-05-07 PROCEDURE — 3080F DIAST BP >= 90 MM HG: CPT | Mod: CPTII,S$GLB,, | Performed by: NURSE PRACTITIONER

## 2025-05-07 PROCEDURE — 3077F SYST BP >= 140 MM HG: CPT | Mod: CPTII,S$GLB,, | Performed by: NURSE PRACTITIONER

## 2025-05-07 PROCEDURE — 3008F BODY MASS INDEX DOCD: CPT | Mod: CPTII,S$GLB,, | Performed by: NURSE PRACTITIONER

## 2025-05-07 RX ORDER — LISDEXAMFETAMINE DIMESYLATE 30 MG/1
30 CAPSULE ORAL DAILY
Qty: 30 CAPSULE | Refills: 0 | Status: SHIPPED | OUTPATIENT
Start: 2025-05-07 | End: 2025-05-07

## 2025-05-07 RX ORDER — CETIRIZINE HYDROCHLORIDE 10 MG/1
10 TABLET ORAL DAILY
Qty: 30 TABLET | Refills: 2 | Status: SHIPPED | OUTPATIENT
Start: 2025-05-07 | End: 2025-05-07

## 2025-05-07 RX ORDER — LISDEXAMFETAMINE DIMESYLATE 30 MG/1
30 CAPSULE ORAL DAILY
Qty: 30 CAPSULE | Refills: 0 | Status: SHIPPED | OUTPATIENT
Start: 2025-07-08 | End: 2025-05-07

## 2025-05-07 RX ORDER — LISDEXAMFETAMINE DIMESYLATE 30 MG/1
30 CAPSULE ORAL DAILY
Qty: 30 CAPSULE | Refills: 0 | Status: SHIPPED | OUTPATIENT
Start: 2025-07-08 | End: 2025-08-07

## 2025-05-07 RX ORDER — LISDEXAMFETAMINE DIMESYLATE 30 MG/1
30 CAPSULE ORAL DAILY
Qty: 30 CAPSULE | Refills: 0 | Status: SHIPPED | OUTPATIENT
Start: 2025-06-07 | End: 2025-07-07

## 2025-05-07 RX ORDER — LISDEXAMFETAMINE DIMESYLATE 30 MG/1
30 CAPSULE ORAL DAILY
Qty: 30 CAPSULE | Refills: 0 | Status: SHIPPED | OUTPATIENT
Start: 2025-06-07 | End: 2025-05-07

## 2025-05-07 RX ORDER — CETIRIZINE HYDROCHLORIDE 10 MG/1
10 TABLET ORAL DAILY
Qty: 30 TABLET | Refills: 5 | Status: SHIPPED | OUTPATIENT
Start: 2025-05-07

## 2025-05-07 RX ORDER — ALBUTEROL SULFATE 90 UG/1
2 INHALANT RESPIRATORY (INHALATION) EVERY 6 HOURS PRN
Qty: 18 G | Refills: 2 | Status: SHIPPED | OUTPATIENT
Start: 2025-05-07 | End: 2026-05-07

## 2025-05-07 RX ORDER — AMOXICILLIN AND CLAVULANATE POTASSIUM 875; 125 MG/1; MG/1
1 TABLET, FILM COATED ORAL 2 TIMES DAILY
Qty: 10 TABLET | Refills: 0 | Status: SHIPPED | OUTPATIENT
Start: 2025-05-07 | End: 2025-05-12

## 2025-05-07 RX ORDER — LISDEXAMFETAMINE DIMESYLATE 30 MG/1
30 CAPSULE ORAL DAILY
Qty: 30 CAPSULE | Refills: 0 | Status: SHIPPED | OUTPATIENT
Start: 2025-05-07 | End: 2025-06-06

## 2025-05-07 RX ORDER — ALBUTEROL SULFATE 90 UG/1
2 INHALANT RESPIRATORY (INHALATION) EVERY 6 HOURS PRN
Qty: 18 G | Refills: 0 | Status: SHIPPED | OUTPATIENT
Start: 2025-05-07 | End: 2025-05-07

## 2025-05-07 RX ORDER — TRIAMCINOLONE ACETONIDE 1 MG/G
OINTMENT TOPICAL
COMMUNITY
Start: 2025-04-08

## 2025-05-07 RX ORDER — AMOXICILLIN AND CLAVULANATE POTASSIUM 875; 125 MG/1; MG/1
1 TABLET, FILM COATED ORAL 2 TIMES DAILY
Qty: 10 TABLET | Refills: 0 | Status: SHIPPED | OUTPATIENT
Start: 2025-05-07 | End: 2025-05-07

## 2025-05-07 NOTE — PROGRESS NOTES
Subjective:       Patient ID: Srinivas Snyder Jr. is a 49 y.o. male.    Chief Complaint: Follow-up    History of Present Illness    CHIEF COMPLAINT:  Patient presents today with upper respiratory symptoms for the past month.  ADHD management.     HISTORY OF PRESENT ILLNESS:  He reports upper respiratory infection symptoms began while in [place] one month ago, where he tested negative for COVID and flu. He received IV vitamin therapy at a \[facility]\"" hospital and was bedridden for one week during the illness. Currently, he experiences persistent productive cough throughout the day (previously associated with vomiting but now improved), sinus pressure in multiple areas of his face, intermittent breathing difficulties, and body aches. He recently had diarrhea which has resolved.    CURRENT MEDICATIONS:  He takes Vyvanse 30 mg daily and Benadryl at night which has been somewhat helpful. He has Flonase at home but is not using it. He completed a course of Z-Mark three weeks ago.    MENTAL HEALTH HISTORY:  He has a history of anxiety and depression. He previously took Cymbalta with positive response, noting decreased racing thoughts, but discontinued after 3 months due to concerns about interactions with Vyvanse.    ROS:  ENT: +nasal congestion, +sinus pressure, +sore throat  Respiratory: +difficulty breathing, +cough, +productive cough  Gastrointestinal: -vomiting, -diarrhea  Musculoskeletal: +body aches  Psychiatric: +anxiety     Objective:      Physical Exam  Vitals reviewed.   Constitutional:       Appearance: Normal appearance.   HENT:      Head: Normocephalic and atraumatic.      Right Ear: Tympanic membrane normal.      Left Ear: Tympanic membrane normal.      Nose: Congestion and rhinorrhea present.      Right Sinus: Maxillary sinus tenderness and frontal sinus tenderness present.      Left Sinus: Maxillary sinus tenderness and frontal sinus tenderness present.      Mouth/Throat:      Mouth: Mucous membranes are  moist.   Eyes:      Pupils: Pupils are equal, round, and reactive to light.   Cardiovascular:      Rate and Rhythm: Normal rate and regular rhythm.      Heart sounds: Normal heart sounds.   Pulmonary:      Effort: Pulmonary effort is normal.      Breath sounds: Normal breath sounds.   Abdominal:      General: Abdomen is flat. Bowel sounds are normal.      Palpations: Abdomen is soft.   Musculoskeletal:         General: Normal range of motion.      Cervical back: Normal range of motion.   Skin:     General: Skin is warm and dry.   Neurological:      General: No focal deficit present.      Mental Status: He is alert and oriented to person, place, and time.   Psychiatric:         Mood and Affect: Mood normal.         Behavior: Behavior normal.         Assessment:       1. Attention deficit disorder, unspecified type  -Chronic, stable.   -refills for Vyvanse provided.   - lisdexamfetamine (VYVANSE) 30 MG capsule; Take 1 capsule (30 mg total) by mouth once daily.  Dispense: 30 capsule; Refill: 0  - lisdexamfetamine (VYVANSE) 30 MG capsule; Take 1 capsule (30 mg total) by mouth once daily.  Dispense: 30 capsule; Refill: 0  - lisdexamfetamine (VYVANSE) 30 MG capsule; Take 1 capsule (30 mg total) by mouth once daily.  Dispense: 30 capsule; Refill: 0    2. Nasal congestion  - X-Ray Chest PA And Lateral; Future    3. Sinus congestion  - X-Ray Chest PA And Lateral; Future    4. Subacute cough  - X-Ray Chest PA And Lateral; Future    5. Sinusitis, unspecified chronicity, unspecified location  - albuterol (VENTOLIN HFA) 90 mcg/actuation inhaler; Inhale 2 puffs into the lungs every 6 (six) hours as needed for Wheezing. Rescue  Dispense: 18 g; Refill: 2  - amoxicillin-clavulanate 875-125mg (AUGMENTIN) 875-125 mg per tablet; Take 1 tablet by mouth 2 (two) times daily. for 5 days  Dispense: 10 tablet; Refill: 0  - cetirizine (ZYRTEC) 10 MG tablet; Take 1 tablet (10 mg total) by mouth once daily.  Dispense: 30 tablet; Refill: 5    6.  Anxiety  -Follow up with psych to discuss resuming duloxetine due to interaction concerns.       Plan:       Assessment & Plan    IMPRESSION:  Suspect sinus infection developed from previous respiratory illness.  Considered possibility of pneumonia due to persistent cough and shortness of breath.  Assessed lung spasms as likely cause of persistent coughing.  Evaluated potential drug interactions between Duloxetine and Vyvanse, noting caution due to possible increased concentration of Vyvanse.  Lungs clear upon exam, attributing cough to spasms or nasal drip.    PLAN SUMMARY:  - Continue Vyvanse 30 mg daily  - Restart Flonase nasal spray twice daily  - Start Zyrtec for allergic symptoms  - Prescribe albuterol inhaler: 2 puffs every 4-6 hours as needed  - Discontinue Benadryl  - Order chest radiograph to rule out pneumonia  - Prescribe antibiotic therapy for suspected sinusitis  - Follow up with psychiatrist for anxiety and depression management  - Follow-up with Dr. Zacarias for potential resumption of duloxetine and guidance on duloxetine-Vyvanse interaction    ## ACUTE UPPER RESPIRATORY INFECTION:  - Patient has been experiencing upper respiratory symptoms for almost a month, including nasal and pharyngeal issues, myalgia, dyspnea, and cough.  - COVID-19 and influenza tests were negative during the illness.  - Examination revealed sinus pressure and dry cough, with clear lung auscultation.  - Suspected sinusitis and ordered chest radiograph to rule out pneumonia due to cough and dyspnea.  - Prescribed antibiotic therapy for treatment, along with Flonase nasal spray twice daily, Zyrtec for allergic symptoms, and albuterol inhaler for respiratory symptoms.    ## ACUTE  COUGH:  - Patient experiencing persistent cough throughout the day, occasionally productive of mucus.  - Cough likely related to bronchospasm and postnasal drip.  - Prescribed albuterol inhaler: 2 puffs every 4-6 hours as needed to help relax  bronchial smooth muscles and reduce coughing.  - Demonstrated proper technique for using albuterol inhaler, including priming, inhalation method, and breath-holding.    ## POSTNASAL DRIP:  - Identified postnasal drip as contributing to nocturnal coughing.  - Restarted Flonase nasal spray twice daily and Zyrtec to help dry secretions and reduce postnasal drip.  - Discontinued Benadryl.  - Instructed patient to sleep with head elevated to prevent postnasal drip.    ## ANXIETY DISORDER:  - Patient reports anxiety and racing thoughts, previously managed with duloxetine and Vyvanse.  - Patient discontinued duloxetine due to concerns about combining it with Vyvanse, though reported it was effective for anxiety.  - Continued Vyvanse 30 mg daily.  - Will monitor for potential interaction between duloxetine and Vyvanse.    ## DEPRESSION:  - Patient reports depression previously managed with duloxetine, which was effective but discontinued due to concerns about interaction with Vyvanse.    This note was generated with the assistance of ambient listening technology. Verbal consent was obtained by the patient and accompanying visitor(s) for the recording of patient appointment to facilitate this note. I attest to having reviewed and edited the generated note for accuracy, though some syntax or spelling errors may persist. Please contact the author of this note for any clarification.

## 2025-05-27 ENCOUNTER — OFFICE VISIT (OUTPATIENT)
Dept: UROLOGY | Facility: CLINIC | Age: 50
End: 2025-05-27
Payer: COMMERCIAL

## 2025-05-27 VITALS
WEIGHT: 165.56 LBS | HEIGHT: 68 IN | SYSTOLIC BLOOD PRESSURE: 141 MMHG | HEART RATE: 90 BPM | DIASTOLIC BLOOD PRESSURE: 91 MMHG | BODY MASS INDEX: 25.09 KG/M2

## 2025-05-27 DIAGNOSIS — Z30.09 STERILIZATION CONSULT: Primary | ICD-10-CM

## 2025-05-27 DIAGNOSIS — F98.8 ATTENTION DEFICIT DISORDER, UNSPECIFIED TYPE: ICD-10-CM

## 2025-05-27 DIAGNOSIS — F41.9 ANXIETY: ICD-10-CM

## 2025-05-27 PROCEDURE — 3008F BODY MASS INDEX DOCD: CPT | Mod: CPTII,S$GLB,, | Performed by: UROLOGY

## 2025-05-27 PROCEDURE — 1160F RVW MEDS BY RX/DR IN RCRD: CPT | Mod: CPTII,S$GLB,, | Performed by: UROLOGY

## 2025-05-27 PROCEDURE — 3077F SYST BP >= 140 MM HG: CPT | Mod: CPTII,S$GLB,, | Performed by: UROLOGY

## 2025-05-27 PROCEDURE — 99999 PR PBB SHADOW E&M-EST. PATIENT-LVL IV: CPT | Mod: PBBFAC,,, | Performed by: UROLOGY

## 2025-05-27 PROCEDURE — 1159F MED LIST DOCD IN RCRD: CPT | Mod: CPTII,S$GLB,, | Performed by: UROLOGY

## 2025-05-27 PROCEDURE — 3080F DIAST BP >= 90 MM HG: CPT | Mod: CPTII,S$GLB,, | Performed by: UROLOGY

## 2025-05-27 PROCEDURE — 99203 OFFICE O/P NEW LOW 30 MIN: CPT | Mod: S$GLB,,, | Performed by: UROLOGY

## 2025-05-27 RX ORDER — DIAZEPAM 5 MG/1
15 TABLET ORAL ONCE
Qty: 3 TABLET | Refills: 0 | Status: SHIPPED | OUTPATIENT
Start: 2025-05-27 | End: 2025-05-28

## 2025-05-27 NOTE — PROGRESS NOTES
"Urology - [facility]  Clinic Note    SUBJECTIVE:     Chief Complaint: sterilization    History of Present Illness:  Srinivas Snyder . is a 49 y.o. male who presents to clinic for sterilization consult. He is established to our clinic to our clinic. Referral from Self, Aaareferral     History of Present Illness    49-year-old male presents today to discuss vasectomy.    He has a history of septic arthritis requiring surgery with Dr. Dewitt last year.    He takes thyroid medication, cholesterol medication, and Vyvanse for ADHD.    He reports experiencing anxiety easily.               Anticoagulation:  No    OBJECTIVE:     Estimated body mass index is 25.17 kg/m² as calculated from the following:    Height as of this encounter: 5' 8" (1.727 m).    Weight as of this encounter: 75.1 kg (165 lb 9.1 oz).    Vital Signs (Most Recent)  Pulse: 90 (05/27/25 1450)  BP: (!) 141/91 (05/27/25 1450)    Physical Exam  Bilateral vas easily palpable. Normal testicle bilaterally.   Lab Results   Component Value Date    BUN 11 09/12/2023    CREATININE 1.2 09/12/2023    WBC 5.10 09/12/2023    HGB 14.4 09/12/2023    HCT 44.3 09/12/2023     09/12/2023    AST 21 09/12/2023    ALT 19 09/12/2023    ALKPHOS 75 09/12/2023    ALBUMIN 4.0 09/12/2023    HGBA1C 5.1 09/12/2023        Lab Results   Component Value Date    PSA 0.75 09/12/2023    PSA 0.79 07/19/2021       Imaging:  none        ASSESSMENT     1. Sterilization consult    2. Anxiety    3. Attention deficit disorder, unspecified type      PLAN:   Srinivas Wade" was seen today for sterilization.    Diagnoses and all orders for this visit:    Sterilization consult  -     diazePAM (VALIUM) 5 MG tablet; Take 3 tablets (15 mg total) by mouth once. [id number]. Take all three pills at once 30-45 minutes prior to procedure. for 1 dose  -     Vasectomy; Future    Anxiety    Attention deficit disorder, unspecified type        Assessment & Plan    E03.9 Hypothyroidism, " unspecified  E78.5 Hyperlipidemia, unspecified  F90.9 Attention-deficit hyperactivity disorder, unspecified type  F41.9 Anxiety disorder, unspecified  Z87.898 Personal history of other specified conditions    VASECTOMY (ENCOUNTER FOR STERILIZATION):  - Evaluated for vasectomy; 49-year-old male desiring sterilization.  - Performed exam to assess vas deferens accessibility; suitable candidate for vasectomy based on exam findings.  - Discussed vasectomy procedure: small incisions made, vas deferens cut and tied off in opposite directions to prevent reconnection using suture tie-off method instead of metal clips to reduce risk of chronic discomfort.  - Reviewed potential risks including bleeding, infection, inflammation, testicular swelling/pain, and rare chance of recanalization (tubes reconnecting).  - Clarified that vasectomy only prevents sperm in ejaculate, does not affect urine, ejaculatory volume, orgasms, or erections; majority of ejaculate comes from prostate, not affected by vasectomy.  - Explained that incision sites will be nearly invisible after healing.  - Patient to avoid aspirin, BC powder, goodies, and similar medications for 1 week before the procedure.  - Patient to refrain from heavy lifting or strenuous exercise post-procedure.  - Patient to abstain from sexual activity for 2 weeks after the procedure.  - Patient to continue regular walking activities as tolerated.  - Patient to arrange for a  (wife) to transport home after procedure due to sedation.  - Started Valium 15 mg orally, to be taken as three 5 mg tablets 30-45 minutes prior to procedure.    FOLLOW-UP:  - Follow up to schedule vasectomy procedure for a Wednesday in June.         This note was generated with the assistance of ambient listening technology. Verbal consent was obtained by the patient and accompanying visitor(s) for the recording of patient appointment to facilitate this note. I attest to having reviewed and edited the  generated note for accuracy, though some syntax or spelling errors may persist. Please contact the author of this note for any clarification.        Cecilia Snyder MD

## 2025-06-06 ENCOUNTER — TELEPHONE (OUTPATIENT)
Facility: CLINIC | Age: 50
End: 2025-06-06
Payer: COMMERCIAL

## 2025-06-06 ENCOUNTER — PATIENT MESSAGE (OUTPATIENT)
Facility: CLINIC | Age: 50
End: 2025-06-06
Payer: COMMERCIAL

## 2025-06-06 DIAGNOSIS — F98.8 ATTENTION DEFICIT DISORDER, UNSPECIFIED TYPE: ICD-10-CM

## 2025-06-06 RX ORDER — LISDEXAMFETAMINE DIMESYLATE 30 MG/1
30 CAPSULE ORAL DAILY
Qty: 30 CAPSULE | Refills: 0 | Status: CANCELLED | OUTPATIENT
Start: 2025-06-06 | End: 2025-07-06

## 2025-06-10 RX ORDER — LISDEXAMFETAMINE DIMESYLATE 30 MG/1
30 CAPSULE ORAL DAILY
Qty: 30 CAPSULE | Refills: 0 | Status: SHIPPED | OUTPATIENT
Start: 2025-07-11 | End: 2025-08-10

## 2025-06-10 RX ORDER — LISDEXAMFETAMINE DIMESYLATE 30 MG/1
30 CAPSULE ORAL DAILY
Qty: 30 CAPSULE | Refills: 0 | Status: SHIPPED | OUTPATIENT
Start: 2025-06-10 | End: 2025-07-10

## 2025-06-10 RX ORDER — LISDEXAMFETAMINE DIMESYLATE 30 MG/1
30 CAPSULE ORAL DAILY
Qty: 30 CAPSULE | Refills: 0 | Status: SHIPPED | OUTPATIENT
Start: 2025-08-11 | End: 2025-09-10

## 2025-06-11 ENCOUNTER — TELEPHONE (OUTPATIENT)
Facility: CLINIC | Age: 50
End: 2025-06-11
Payer: COMMERCIAL

## 2025-06-11 NOTE — TELEPHONE ENCOUNTER
LMOR for pt re: vasectomy procedure tomorrow.  Instructions given re: shaving, tight underwear or   jock strap & no ASA products, only Tylenol.  Instructions given re: location

## (undated) DEVICE — SOL NS 1000CC

## (undated) DEVICE — BLADE GATOR 2.0

## (undated) DEVICE — BANDAGE ESMARK ELASTIC ST 4X9

## (undated) DEVICE — PAD CAST SPECIALIST STRL 4

## (undated) DEVICE — TRAP DIGIT FINGER

## (undated) DEVICE — BANDAGE ELASTIC ACE 2IN 10/CA

## (undated) DEVICE — Device

## (undated) DEVICE — NDL 18GA X1 1/2 REG BEVEL

## (undated) DEVICE — DRAPE STERI-DRAPE 1000 17X11IN

## (undated) DEVICE — STRAP TRACTION TOWER FRARM 20I

## (undated) DEVICE — DRESSING N ADH OIL EMUL 3X3

## (undated) DEVICE — PEN LIGHTS DIAGNOSTIC C-LINE

## (undated) DEVICE — BLADE SURG CARBON STEEL SZ11

## (undated) DEVICE — STRAP TRACTION TOWER WRIST 19I

## (undated) DEVICE — SPONGE COTTON TRAY 4X4IN

## (undated) DEVICE — TOURNIQUET SB QC SP 18X4IN

## (undated) DEVICE — STRAP TRACTION TOWER UA PAD 6F

## (undated) DEVICE — SUT MONOCRYL 4.0 PS2 CP496G

## (undated) DEVICE — GLOVE BIOGEL SKINSENSE PI 7.0